# Patient Record
Sex: FEMALE | Race: WHITE | NOT HISPANIC OR LATINO | Employment: OTHER | URBAN - METROPOLITAN AREA
[De-identification: names, ages, dates, MRNs, and addresses within clinical notes are randomized per-mention and may not be internally consistent; named-entity substitution may affect disease eponyms.]

---

## 2022-01-01 ENCOUNTER — OFFICE VISIT (OUTPATIENT)
Dept: FAMILY MEDICINE | Facility: CLINIC | Age: 77
End: 2022-01-01
Payer: MEDICARE

## 2022-01-01 ENCOUNTER — LAB (OUTPATIENT)
Dept: INFUSION THERAPY | Facility: HOSPITAL | Age: 77
End: 2022-01-01
Attending: INTERNAL MEDICINE
Payer: MEDICARE

## 2022-01-01 ENCOUNTER — APPOINTMENT (OUTPATIENT)
Dept: RADIOLOGY | Facility: HOSPITAL | Age: 77
End: 2022-01-01
Attending: INTERNAL MEDICINE

## 2022-01-01 ENCOUNTER — HOSPITAL ENCOUNTER (OUTPATIENT)
Dept: INTERVENTIONAL RADIOLOGY/VASCULAR | Facility: HOSPITAL | Age: 77
Discharge: HOME | End: 2022-11-11
Attending: INTERNAL MEDICINE
Payer: MEDICARE

## 2022-01-01 ENCOUNTER — APPOINTMENT (OUTPATIENT)
Dept: RADIOLOGY | Facility: HOSPITAL | Age: 77
DRG: 177 | End: 2022-01-01
Attending: INTERNAL MEDICINE
Payer: MEDICARE

## 2022-01-01 ENCOUNTER — APPOINTMENT (OUTPATIENT)
Dept: RADIOLOGY | Facility: HOSPITAL | Age: 77
DRG: 391 | End: 2022-01-01
Attending: INTERNAL MEDICINE
Payer: MEDICARE

## 2022-01-01 ENCOUNTER — APPOINTMENT (OUTPATIENT)
Dept: RADIOLOGY | Facility: HOSPITAL | Age: 77
DRG: 177 | End: 2022-01-01
Attending: EMERGENCY MEDICINE
Payer: MEDICARE

## 2022-01-01 ENCOUNTER — TELEPHONE (OUTPATIENT)
Dept: PULMONOLOGY | Facility: OTHER | Age: 77
End: 2022-01-01

## 2022-01-01 ENCOUNTER — ANESTHESIA (OUTPATIENT)
Dept: SURGERY | Facility: HOSPITAL | Age: 77
DRG: 177 | End: 2022-01-01
Payer: MEDICARE

## 2022-01-01 ENCOUNTER — DOCUMENTATION ONLY (OUTPATIENT)
Dept: PULMONOLOGY | Facility: OTHER | Age: 77
End: 2022-01-01

## 2022-01-01 ENCOUNTER — ANCILLARY PROCEDURE (OUTPATIENT)
Dept: GENERAL RADIOLOGY | Facility: CLINIC | Age: 77
End: 2022-01-01
Attending: PHYSICIAN ASSISTANT
Payer: MEDICARE

## 2022-01-01 ENCOUNTER — HOSPITAL ENCOUNTER (OUTPATIENT)
Dept: MRI IMAGING | Facility: HOSPITAL | Age: 77
Discharge: HOME | End: 2022-11-11
Attending: INTERNAL MEDICINE
Payer: MEDICARE

## 2022-01-01 ENCOUNTER — MYC MEDICAL ADVICE (OUTPATIENT)
Dept: INTERVENTIONAL RADIOLOGY/VASCULAR | Facility: CLINIC | Age: 77
End: 2022-01-01

## 2022-01-01 ENCOUNTER — ONCOLOGY VISIT (OUTPATIENT)
Dept: ONCOLOGY | Facility: HOSPITAL | Age: 77
End: 2022-01-01
Attending: INTERNAL MEDICINE
Payer: MEDICARE

## 2022-01-01 ENCOUNTER — HOSPITAL ENCOUNTER (INPATIENT)
Facility: HOSPITAL | Age: 77
LOS: 5 days | Discharge: HOME | DRG: 177 | End: 2022-10-04
Attending: EMERGENCY MEDICINE | Admitting: INTERNAL MEDICINE
Payer: MEDICARE

## 2022-01-01 ENCOUNTER — TELEPHONE (OUTPATIENT)
Dept: PULMONOLOGY | Facility: OTHER | Age: 77
End: 2022-01-01
Payer: MEDICARE

## 2022-01-01 ENCOUNTER — MEDICAL CORRESPONDENCE (OUTPATIENT)
Dept: HEALTH INFORMATION MANAGEMENT | Facility: CLINIC | Age: 77
End: 2022-01-01

## 2022-01-01 ENCOUNTER — PATIENT OUTREACH (OUTPATIENT)
Dept: CARE COORDINATION | Facility: CLINIC | Age: 77
End: 2022-01-01

## 2022-01-01 ENCOUNTER — APPOINTMENT (OUTPATIENT)
Dept: CT IMAGING | Facility: HOSPITAL | Age: 77
DRG: 391 | End: 2022-01-01
Attending: EMERGENCY MEDICINE
Payer: MEDICARE

## 2022-01-01 ENCOUNTER — APPOINTMENT (OUTPATIENT)
Dept: OCCUPATIONAL THERAPY | Facility: HOSPITAL | Age: 77
DRG: 177 | End: 2022-01-01
Attending: INTERNAL MEDICINE
Payer: MEDICARE

## 2022-01-01 ENCOUNTER — APPOINTMENT (OUTPATIENT)
Dept: CT IMAGING | Facility: HOSPITAL | Age: 77
DRG: 177 | End: 2022-01-01
Attending: EMERGENCY MEDICINE
Payer: MEDICARE

## 2022-01-01 ENCOUNTER — HOSPITAL ENCOUNTER (OUTPATIENT)
Dept: ULTRASOUND IMAGING | Facility: HOSPITAL | Age: 77
Discharge: HOME | End: 2022-10-28
Attending: INTERNAL MEDICINE | Admitting: INTERNAL MEDICINE
Payer: MEDICARE

## 2022-01-01 ENCOUNTER — PATIENT OUTREACH (OUTPATIENT)
Dept: ONCOLOGY | Facility: HOSPITAL | Age: 77
End: 2022-01-01

## 2022-01-01 ENCOUNTER — HOSPITAL ENCOUNTER (INPATIENT)
Facility: HOSPITAL | Age: 77
LOS: 6 days | Discharge: HOSPICE | DRG: 391 | End: 2022-11-26
Attending: EMERGENCY MEDICINE | Admitting: STUDENT IN AN ORGANIZED HEALTH CARE EDUCATION/TRAINING PROGRAM
Payer: MEDICARE

## 2022-01-01 ENCOUNTER — APPOINTMENT (OUTPATIENT)
Dept: PHYSICAL THERAPY | Facility: HOSPITAL | Age: 77
DRG: 177 | End: 2022-01-01
Attending: INTERNAL MEDICINE
Payer: MEDICARE

## 2022-01-01 ENCOUNTER — HOSPITAL ENCOUNTER (INPATIENT)
Facility: HOSPITAL | Age: 77
LOS: 4 days | End: 2022-11-30
Attending: INTERNAL MEDICINE | Admitting: INTERNAL MEDICINE
Payer: MEDICARE

## 2022-01-01 ENCOUNTER — DOCUMENTATION ONLY (OUTPATIENT)
Dept: OTHER | Facility: CLINIC | Age: 77
End: 2022-01-01

## 2022-01-01 ENCOUNTER — APPOINTMENT (OUTPATIENT)
Dept: PHYSICAL THERAPY | Facility: HOSPITAL | Age: 77
DRG: 177 | End: 2022-01-01
Payer: MEDICARE

## 2022-01-01 ENCOUNTER — HOSPITAL ENCOUNTER (OUTPATIENT)
Dept: PET IMAGING | Facility: HOSPITAL | Age: 77
Discharge: HOME | End: 2022-10-13
Attending: INTERNAL MEDICINE | Admitting: INTERNAL MEDICINE
Payer: MEDICARE

## 2022-01-01 ENCOUNTER — ANESTHESIA EVENT (OUTPATIENT)
Dept: SURGERY | Facility: HOSPITAL | Age: 77
DRG: 177 | End: 2022-01-01
Payer: MEDICARE

## 2022-01-01 VITALS
HEIGHT: 63 IN | BODY MASS INDEX: 24.1 KG/M2 | DIASTOLIC BLOOD PRESSURE: 54 MMHG | WEIGHT: 136 LBS | RESPIRATION RATE: 22 BRPM | SYSTOLIC BLOOD PRESSURE: 108 MMHG | OXYGEN SATURATION: 92 % | HEART RATE: 98 BPM | TEMPERATURE: 99.2 F

## 2022-01-01 VITALS
OXYGEN SATURATION: 94 % | BODY MASS INDEX: 26.34 KG/M2 | HEART RATE: 102 BPM | TEMPERATURE: 99.8 F | DIASTOLIC BLOOD PRESSURE: 60 MMHG | WEIGHT: 144 LBS | SYSTOLIC BLOOD PRESSURE: 104 MMHG

## 2022-01-01 VITALS
SYSTOLIC BLOOD PRESSURE: 122 MMHG | RESPIRATION RATE: 20 BRPM | DIASTOLIC BLOOD PRESSURE: 58 MMHG | HEIGHT: 63 IN | BODY MASS INDEX: 24.26 KG/M2 | WEIGHT: 136.9 LBS | TEMPERATURE: 98.4 F | OXYGEN SATURATION: 93 % | HEART RATE: 90 BPM

## 2022-01-01 VITALS
TEMPERATURE: 98.1 F | OXYGEN SATURATION: 96 % | DIASTOLIC BLOOD PRESSURE: 61 MMHG | RESPIRATION RATE: 18 BRPM | HEART RATE: 108 BPM | SYSTOLIC BLOOD PRESSURE: 111 MMHG

## 2022-01-01 VITALS
DIASTOLIC BLOOD PRESSURE: 80 MMHG | HEART RATE: 78 BPM | OXYGEN SATURATION: 94 % | TEMPERATURE: 97.8 F | RESPIRATION RATE: 18 BRPM | SYSTOLIC BLOOD PRESSURE: 124 MMHG

## 2022-01-01 VITALS
HEIGHT: 62 IN | SYSTOLIC BLOOD PRESSURE: 98 MMHG | TEMPERATURE: 98.5 F | DIASTOLIC BLOOD PRESSURE: 52 MMHG | BODY MASS INDEX: 26.48 KG/M2 | HEART RATE: 100 BPM | RESPIRATION RATE: 16 BRPM | OXYGEN SATURATION: 91 % | WEIGHT: 143.9 LBS

## 2022-01-01 VITALS
HEART RATE: 70 BPM | RESPIRATION RATE: 18 BRPM | SYSTOLIC BLOOD PRESSURE: 112 MMHG | OXYGEN SATURATION: 96 % | TEMPERATURE: 98.4 F | DIASTOLIC BLOOD PRESSURE: 64 MMHG

## 2022-01-01 DIAGNOSIS — R13.12 OROPHARYNGEAL DYSPHAGIA: ICD-10-CM

## 2022-01-01 DIAGNOSIS — C34.91 NON-SMALL CELL CANCER OF RIGHT LUNG (H): ICD-10-CM

## 2022-01-01 DIAGNOSIS — R91.8 LUNG MASS: ICD-10-CM

## 2022-01-01 DIAGNOSIS — D70.1 CHEMOTHERAPY-INDUCED NEUTROPENIA (H): ICD-10-CM

## 2022-01-01 DIAGNOSIS — T17.908D ASPIRATION OF FOREIGN BODY, SUBSEQUENT ENCOUNTER: ICD-10-CM

## 2022-01-01 DIAGNOSIS — C34.31 MALIGNANT NEOPLASM OF LOWER LOBE OF RIGHT LUNG (H): Primary | ICD-10-CM

## 2022-01-01 DIAGNOSIS — R09.A2 GLOBUS SENSATION: ICD-10-CM

## 2022-01-01 DIAGNOSIS — D70.1 CHEMOTHERAPY-INDUCED NEUTROPENIA (H): Primary | ICD-10-CM

## 2022-01-01 DIAGNOSIS — J96.01 ACUTE RESPIRATORY FAILURE WITH HYPOXIA (H): Primary | ICD-10-CM

## 2022-01-01 DIAGNOSIS — J96.01 ACUTE RESPIRATORY FAILURE WITH HYPOXIA (H): ICD-10-CM

## 2022-01-01 DIAGNOSIS — J18.9 PNEUMONIA OF RIGHT LOWER LOBE DUE TO INFECTIOUS ORGANISM: ICD-10-CM

## 2022-01-01 DIAGNOSIS — J44.1 COPD EXACERBATION (H): ICD-10-CM

## 2022-01-01 DIAGNOSIS — Z99.81 ON HOME OXYGEN THERAPY: ICD-10-CM

## 2022-01-01 DIAGNOSIS — D69.6 THROMBOCYTOPENIA (H): ICD-10-CM

## 2022-01-01 DIAGNOSIS — T45.1X5A CHEMOTHERAPY-INDUCED NEUTROPENIA (H): Primary | ICD-10-CM

## 2022-01-01 DIAGNOSIS — R05.1 ACUTE COUGH: Primary | ICD-10-CM

## 2022-01-01 DIAGNOSIS — T45.1X5A CHEMOTHERAPY-INDUCED NEUTROPENIA (H): ICD-10-CM

## 2022-01-01 DIAGNOSIS — C34.31 MALIGNANT NEOPLASM OF LOWER LOBE OF RIGHT LUNG (H): ICD-10-CM

## 2022-01-01 DIAGNOSIS — I82.C11 THROMBOSIS OF RIGHT INTERNAL JUGULAR VEIN (H): ICD-10-CM

## 2022-01-01 DIAGNOSIS — Z85.118 HISTORY OF LUNG CANCER: ICD-10-CM

## 2022-01-01 DIAGNOSIS — J44.9 COPD, GROUP D, BY GOLD 2017 CLASSIFICATION (H): Primary | ICD-10-CM

## 2022-01-01 DIAGNOSIS — C34.91 NON-SMALL CELL CANCER OF RIGHT LUNG (H): Primary | ICD-10-CM

## 2022-01-01 DIAGNOSIS — R91.8 LUNG MASS: Primary | ICD-10-CM

## 2022-01-01 DIAGNOSIS — R63.8 UNABLE TO EAT: ICD-10-CM

## 2022-01-01 LAB
ABO/RH(D): NORMAL
ALBUMIN SERPL BCG-MCNC: 3 G/DL (ref 3.5–5.2)
ALBUMIN SERPL BCG-MCNC: 3.3 G/DL (ref 3.5–5.2)
ALBUMIN SERPL BCG-MCNC: 3.6 G/DL (ref 3.5–5.2)
ALP SERPL-CCNC: 57 U/L (ref 35–104)
ALP SERPL-CCNC: 63 U/L (ref 35–104)
ALP SERPL-CCNC: 71 U/L (ref 35–104)
ALT SERPL W P-5'-P-CCNC: 17 U/L (ref 10–35)
ALT SERPL W P-5'-P-CCNC: 29 U/L (ref 10–35)
ALT SERPL W P-5'-P-CCNC: 36 U/L (ref 10–35)
ANION GAP SERPL CALCULATED.3IONS-SCNC: 11 MMOL/L (ref 7–15)
ANION GAP SERPL CALCULATED.3IONS-SCNC: 13 MMOL/L (ref 7–15)
ANION GAP SERPL CALCULATED.3IONS-SCNC: 13 MMOL/L (ref 7–15)
ANION GAP SERPL CALCULATED.3IONS-SCNC: 15 MMOL/L (ref 7–15)
ANION GAP SERPL CALCULATED.3IONS-SCNC: 7 MMOL/L (ref 7–15)
ANION GAP SERPL CALCULATED.3IONS-SCNC: 7 MMOL/L (ref 7–15)
ANION GAP SERPL CALCULATED.3IONS-SCNC: 9 MMOL/L (ref 7–15)
ANION GAP SERPL CALCULATED.3IONS-SCNC: 9 MMOL/L (ref 7–15)
ANTIBODY SCREEN: NEGATIVE
APPEARANCE FLD: ABNORMAL
APTT PPP: 24 SECONDS (ref 22–38)
AST SERPL W P-5'-P-CCNC: 14 U/L (ref 10–35)
AST SERPL W P-5'-P-CCNC: 17 U/L (ref 10–35)
AST SERPL W P-5'-P-CCNC: 50 U/L (ref 10–35)
ATRIAL RATE - MUSE: 87 BPM
BACTERIA BRONCH: NO GROWTH
BACTERIA SPT CULT: ABNORMAL
BACTERIA SPT CULT: NORMAL
BASE EXCESS BLDV CALC-SCNC: 0.8 MMOL/L
BASOPHILS # BLD AUTO: 0 10E3/UL (ref 0–0.2)
BASOPHILS # BLD AUTO: 0 10E3/UL (ref 0–0.2)
BASOPHILS # BLD AUTO: 0.1 10E3/UL (ref 0–0.2)
BASOPHILS # BLD MANUAL: 0 10E3/UL (ref 0–0.2)
BASOPHILS NFR BLD AUTO: 0 %
BASOPHILS NFR BLD AUTO: 1 %
BASOPHILS NFR BLD MANUAL: 0 %
BILIRUB DIRECT SERPL-MCNC: 0.34 MG/DL (ref 0–0.3)
BILIRUB SERPL-MCNC: 0.4 MG/DL
BILIRUB SERPL-MCNC: 0.8 MG/DL
BILIRUB SERPL-MCNC: 1.4 MG/DL
BLD PROD TYP BPU: NORMAL
BLOOD COMPONENT TYPE: NORMAL
BUN SERPL-MCNC: 10 MG/DL (ref 8–23)
BUN SERPL-MCNC: 10.4 MG/DL (ref 8–23)
BUN SERPL-MCNC: 12.3 MG/DL (ref 8–23)
BUN SERPL-MCNC: 13.2 MG/DL (ref 8–23)
BUN SERPL-MCNC: 13.7 MG/DL (ref 8–23)
BUN SERPL-MCNC: 14.1 MG/DL (ref 8–23)
BUN SERPL-MCNC: 6.4 MG/DL (ref 8–23)
BUN SERPL-MCNC: 7.9 MG/DL (ref 8–23)
CALCIUM SERPL-MCNC: 7.5 MG/DL (ref 8.8–10.2)
CALCIUM SERPL-MCNC: 7.6 MG/DL (ref 8.8–10.2)
CALCIUM SERPL-MCNC: 8 MG/DL (ref 8.8–10.2)
CALCIUM SERPL-MCNC: 8.3 MG/DL (ref 8.8–10.2)
CALCIUM SERPL-MCNC: 8.5 MG/DL (ref 8.8–10.2)
CALCIUM SERPL-MCNC: 8.5 MG/DL (ref 8.8–10.2)
CALCIUM SERPL-MCNC: 8.7 MG/DL (ref 8.8–10.2)
CALCIUM SERPL-MCNC: 9.3 MG/DL (ref 8.8–10.2)
CELL COUNT BODY FLUID SOURCE: ABNORMAL
CHLORIDE SERPL-SCNC: 101 MMOL/L (ref 98–107)
CHLORIDE SERPL-SCNC: 105 MMOL/L (ref 98–107)
CHLORIDE SERPL-SCNC: 106 MMOL/L (ref 98–107)
CHLORIDE SERPL-SCNC: 94 MMOL/L (ref 98–107)
CHLORIDE SERPL-SCNC: 95 MMOL/L (ref 98–107)
CHLORIDE SERPL-SCNC: 95 MMOL/L (ref 98–107)
CHLORIDE SERPL-SCNC: 96 MMOL/L (ref 98–107)
CHLORIDE SERPL-SCNC: 99 MMOL/L (ref 98–107)
CODING SYSTEM: NORMAL
COLOR FLD: ABNORMAL
CREAT SERPL-MCNC: 0.43 MG/DL (ref 0.51–0.95)
CREAT SERPL-MCNC: 0.48 MG/DL (ref 0.51–0.95)
CREAT SERPL-MCNC: 0.55 MG/DL (ref 0.51–0.95)
CREAT SERPL-MCNC: 0.55 MG/DL (ref 0.51–0.95)
CREAT SERPL-MCNC: 0.59 MG/DL (ref 0.51–0.95)
CREAT SERPL-MCNC: 0.62 MG/DL (ref 0.51–0.95)
CREAT SERPL-MCNC: 0.65 MG/DL (ref 0.51–0.95)
CREAT SERPL-MCNC: 0.65 MG/DL (ref 0.51–0.95)
CREAT SERPL-MCNC: 0.69 MG/DL (ref 0.51–0.95)
DEPRECATED HCO3 PLAS-SCNC: 21 MMOL/L (ref 22–29)
DEPRECATED HCO3 PLAS-SCNC: 24 MMOL/L (ref 22–29)
DEPRECATED HCO3 PLAS-SCNC: 25 MMOL/L (ref 22–29)
DEPRECATED HCO3 PLAS-SCNC: 25 MMOL/L (ref 22–29)
DEPRECATED HCO3 PLAS-SCNC: 26 MMOL/L (ref 22–29)
DEPRECATED HCO3 PLAS-SCNC: 28 MMOL/L (ref 22–29)
DEPRECATED HCO3 PLAS-SCNC: 28 MMOL/L (ref 22–29)
DEPRECATED HCO3 PLAS-SCNC: 30 MMOL/L (ref 22–29)
DIASTOLIC BLOOD PRESSURE - MUSE: NORMAL MMHG
EOSINOPHIL # BLD AUTO: 0 10E3/UL (ref 0–0.7)
EOSINOPHIL # BLD AUTO: 0.1 10E3/UL (ref 0–0.7)
EOSINOPHIL # BLD AUTO: 0.1 10E3/UL (ref 0–0.7)
EOSINOPHIL # BLD AUTO: 0.2 10E3/UL (ref 0–0.7)
EOSINOPHIL # BLD AUTO: 0.7 10E3/UL (ref 0–0.7)
EOSINOPHIL # BLD MANUAL: 0 10E3/UL (ref 0–0.7)
EOSINOPHIL NFR BLD AUTO: 0 %
EOSINOPHIL NFR BLD AUTO: 0 %
EOSINOPHIL NFR BLD AUTO: 2 %
EOSINOPHIL NFR BLD AUTO: 2 %
EOSINOPHIL NFR BLD AUTO: 7 %
EOSINOPHIL NFR BLD MANUAL: 0 %
EOSINOPHIL NFR FLD MANUAL: 1 %
ERYTHROCYTE [DISTWIDTH] IN BLOOD BY AUTOMATED COUNT: 13.9 % (ref 10–15)
ERYTHROCYTE [DISTWIDTH] IN BLOOD BY AUTOMATED COUNT: 14 % (ref 10–15)
ERYTHROCYTE [DISTWIDTH] IN BLOOD BY AUTOMATED COUNT: 14.1 % (ref 10–15)
ERYTHROCYTE [DISTWIDTH] IN BLOOD BY AUTOMATED COUNT: 14.2 % (ref 10–15)
ERYTHROCYTE [DISTWIDTH] IN BLOOD BY AUTOMATED COUNT: 14.2 % (ref 10–15)
ERYTHROCYTE [DISTWIDTH] IN BLOOD BY AUTOMATED COUNT: 14.3 % (ref 10–15)
ERYTHROCYTE [DISTWIDTH] IN BLOOD BY AUTOMATED COUNT: 14.4 % (ref 10–15)
ERYTHROCYTE [DISTWIDTH] IN BLOOD BY AUTOMATED COUNT: 14.5 % (ref 10–15)
ERYTHROCYTE [DISTWIDTH] IN BLOOD BY AUTOMATED COUNT: 14.6 % (ref 10–15)
FLUAV RNA SPEC QL NAA+PROBE: NEGATIVE
FLUBV RNA RESP QL NAA+PROBE: NEGATIVE
GFR SERPL CREATININE-BSD FRML MDRD: 90 ML/MIN/1.73M2
GFR SERPL CREATININE-BSD FRML MDRD: >90 ML/MIN/1.73M2
GLUCOSE BLDC GLUCOMTR-MCNC: 147 MG/DL (ref 70–99)
GLUCOSE BLDC GLUCOMTR-MCNC: 195 MG/DL (ref 70–99)
GLUCOSE BLDC GLUCOMTR-MCNC: 61 MG/DL (ref 70–99)
GLUCOSE BLDC GLUCOMTR-MCNC: 85 MG/DL (ref 70–99)
GLUCOSE BLDC GLUCOMTR-MCNC: 87 MG/DL (ref 70–99)
GLUCOSE BLDC GLUCOMTR-MCNC: 94 MG/DL (ref 70–99)
GLUCOSE SERPL-MCNC: 111 MG/DL (ref 70–99)
GLUCOSE SERPL-MCNC: 113 MG/DL (ref 70–99)
GLUCOSE SERPL-MCNC: 132 MG/DL (ref 70–99)
GLUCOSE SERPL-MCNC: 134 MG/DL (ref 70–99)
GLUCOSE SERPL-MCNC: 171 MG/DL (ref 70–99)
GLUCOSE SERPL-MCNC: 56 MG/DL (ref 70–99)
GLUCOSE SERPL-MCNC: 70 MG/DL (ref 70–99)
GLUCOSE SERPL-MCNC: 95 MG/DL (ref 70–99)
GRAM STAIN RESULT: ABNORMAL
GRAM STAIN RESULT: NORMAL
HCO3 BLDV-SCNC: 24 MMOL/L (ref 24–30)
HCT VFR BLD AUTO: 32.5 % (ref 35–47)
HCT VFR BLD AUTO: 32.9 % (ref 35–47)
HCT VFR BLD AUTO: 34.2 % (ref 35–47)
HCT VFR BLD AUTO: 34.9 % (ref 35–47)
HCT VFR BLD AUTO: 36 % (ref 35–47)
HCT VFR BLD AUTO: 39.6 % (ref 35–47)
HCT VFR BLD AUTO: 40.5 % (ref 35–47)
HCT VFR BLD AUTO: 41.9 % (ref 35–47)
HCT VFR BLD AUTO: 42.1 % (ref 35–47)
HCT VFR BLD AUTO: 42.7 % (ref 35–47)
HCT VFR BLD AUTO: 43.9 % (ref 35–47)
HCT VFR BLD AUTO: 44.3 % (ref 35–47)
HCT VFR BLD AUTO: 46.1 % (ref 35–47)
HGB BLD-MCNC: 10.2 G/DL (ref 11.7–15.7)
HGB BLD-MCNC: 10.3 G/DL (ref 11.7–15.7)
HGB BLD-MCNC: 11.1 G/DL (ref 11.7–15.7)
HGB BLD-MCNC: 11.3 G/DL (ref 11.7–15.7)
HGB BLD-MCNC: 11.6 G/DL (ref 11.7–15.7)
HGB BLD-MCNC: 12.3 G/DL (ref 11.7–15.7)
HGB BLD-MCNC: 13 G/DL (ref 11.7–15.7)
HGB BLD-MCNC: 13.1 G/DL (ref 11.7–15.7)
HGB BLD-MCNC: 13.4 G/DL (ref 11.7–15.7)
HGB BLD-MCNC: 13.6 G/DL (ref 11.7–15.7)
HGB BLD-MCNC: 14 G/DL (ref 11.7–15.7)
HGB BLD-MCNC: 14.4 G/DL (ref 11.7–15.7)
HGB BLD-MCNC: 14.6 G/DL (ref 11.7–15.7)
HOLD SPECIMEN: NORMAL
HOLD SPECIMEN: NORMAL
IMM GRANULOCYTES # BLD: 0.1 10E3/UL
IMM GRANULOCYTES # BLD: 0.1 10E3/UL
IMM GRANULOCYTES # BLD: 0.2 10E3/UL
IMM GRANULOCYTES # BLD: 0.3 10E3/UL
IMM GRANULOCYTES # BLD: 0.6 10E3/UL
IMM GRANULOCYTES NFR BLD: 1 %
IMM GRANULOCYTES NFR BLD: 2 %
IMM GRANULOCYTES NFR BLD: 3 %
INR PPP: 1.05 (ref 0.85–1.15)
INTERPRETATION ECG - MUSE: NORMAL
INTERPRETATION: NORMAL
ISSUE DATE AND TIME: NORMAL
LAB DIRECTOR COMMENTS: NORMAL
LAB DIRECTOR DISCLAIMER: NORMAL
LAB DIRECTOR INTERPRETATION: NORMAL
LAB DIRECTOR METHODOLOGY: NORMAL
LAB DIRECTOR RESULTS: NORMAL
LACTATE SERPL-SCNC: 1.1 MMOL/L (ref 0.7–2)
LYMPHOCYTES # BLD AUTO: 0.5 10E3/UL (ref 0.8–5.3)
LYMPHOCYTES # BLD AUTO: 0.6 10E3/UL (ref 0.8–5.3)
LYMPHOCYTES # BLD AUTO: 0.9 10E3/UL (ref 0.8–5.3)
LYMPHOCYTES # BLD AUTO: 1.6 10E3/UL (ref 0.8–5.3)
LYMPHOCYTES # BLD AUTO: 2 10E3/UL (ref 0.8–5.3)
LYMPHOCYTES # BLD MANUAL: 0.2 10E3/UL (ref 0.8–5.3)
LYMPHOCYTES # BLD MANUAL: 0.4 10E3/UL (ref 0.8–5.3)
LYMPHOCYTES # BLD MANUAL: 0.4 10E3/UL (ref 0.8–5.3)
LYMPHOCYTES # BLD MANUAL: 0.5 10E3/UL (ref 0.8–5.3)
LYMPHOCYTES NFR BLD AUTO: 12 %
LYMPHOCYTES NFR BLD AUTO: 18 %
LYMPHOCYTES NFR BLD AUTO: 3 %
LYMPHOCYTES NFR BLD AUTO: 7 %
LYMPHOCYTES NFR BLD AUTO: 9 %
LYMPHOCYTES NFR BLD MANUAL: 22 %
LYMPHOCYTES NFR BLD MANUAL: 27 %
LYMPHOCYTES NFR BLD MANUAL: 64 %
LYMPHOCYTES NFR BLD MANUAL: 88 %
LYMPHOCYTES NFR FLD MANUAL: 9 %
MCH RBC QN AUTO: 27.3 PG (ref 26.5–33)
MCH RBC QN AUTO: 27.5 PG (ref 26.5–33)
MCH RBC QN AUTO: 27.7 PG (ref 26.5–33)
MCH RBC QN AUTO: 27.9 PG (ref 26.5–33)
MCH RBC QN AUTO: 28 PG (ref 26.5–33)
MCH RBC QN AUTO: 28.1 PG (ref 26.5–33)
MCH RBC QN AUTO: 28.2 PG (ref 26.5–33)
MCH RBC QN AUTO: 28.6 PG (ref 26.5–33)
MCH RBC QN AUTO: 28.7 PG (ref 26.5–33)
MCHC RBC AUTO-ENTMCNC: 31 G/DL (ref 31.5–36.5)
MCHC RBC AUTO-ENTMCNC: 31 G/DL (ref 31.5–36.5)
MCHC RBC AUTO-ENTMCNC: 31.1 G/DL (ref 31.5–36.5)
MCHC RBC AUTO-ENTMCNC: 31.1 G/DL (ref 31.5–36.5)
MCHC RBC AUTO-ENTMCNC: 31.2 G/DL (ref 31.5–36.5)
MCHC RBC AUTO-ENTMCNC: 31.6 G/DL (ref 31.5–36.5)
MCHC RBC AUTO-ENTMCNC: 31.7 G/DL (ref 31.5–36.5)
MCHC RBC AUTO-ENTMCNC: 31.9 G/DL (ref 31.5–36.5)
MCHC RBC AUTO-ENTMCNC: 32.2 G/DL (ref 31.5–36.5)
MCHC RBC AUTO-ENTMCNC: 32.4 G/DL (ref 31.5–36.5)
MCHC RBC AUTO-ENTMCNC: 32.5 G/DL (ref 31.5–36.5)
MCHC RBC AUTO-ENTMCNC: 33.1 G/DL (ref 31.5–36.5)
MCHC RBC AUTO-ENTMCNC: 33.3 G/DL (ref 31.5–36.5)
MCV RBC AUTO: 85 FL (ref 78–100)
MCV RBC AUTO: 86 FL (ref 78–100)
MCV RBC AUTO: 86 FL (ref 78–100)
MCV RBC AUTO: 87 FL (ref 78–100)
MCV RBC AUTO: 88 FL (ref 78–100)
MCV RBC AUTO: 89 FL (ref 78–100)
MCV RBC AUTO: 90 FL (ref 78–100)
MCV RBC AUTO: 90 FL (ref 78–100)
MCV RBC AUTO: 91 FL (ref 78–100)
MONOCYTES # BLD AUTO: 0 10E3/UL (ref 0–1.3)
MONOCYTES # BLD AUTO: 0 10E3/UL (ref 0–1.3)
MONOCYTES # BLD AUTO: 1 10E3/UL (ref 0–1.3)
MONOCYTES # BLD AUTO: 1.4 10E3/UL (ref 0–1.3)
MONOCYTES # BLD AUTO: 1.9 10E3/UL (ref 0–1.3)
MONOCYTES # BLD MANUAL: 0 10E3/UL (ref 0–1.3)
MONOCYTES NFR BLD AUTO: 0 %
MONOCYTES NFR BLD AUTO: 1 %
MONOCYTES NFR BLD AUTO: 7 %
MONOCYTES NFR BLD AUTO: 8 %
MONOCYTES NFR BLD AUTO: 9 %
MONOCYTES NFR BLD MANUAL: 0 %
MONOCYTES NFR BLD MANUAL: 1 %
MONOCYTES NFR BLD MANUAL: 2 %
MONOCYTES NFR BLD MANUAL: 2 %
MONOS+MACROS NFR FLD MANUAL: 14 %
NEUTROPHILS # BLD AUTO: 17.7 10E3/UL (ref 1.6–8.3)
NEUTROPHILS # BLD AUTO: 18.8 10E3/UL (ref 1.6–8.3)
NEUTROPHILS # BLD AUTO: 3.8 10E3/UL (ref 1.6–8.3)
NEUTROPHILS # BLD AUTO: 7.4 10E3/UL (ref 1.6–8.3)
NEUTROPHILS # BLD AUTO: 8.2 10E3/UL (ref 1.6–8.3)
NEUTROPHILS # BLD MANUAL: 0.1 10E3/UL (ref 1.6–8.3)
NEUTROPHILS # BLD MANUAL: 0.2 10E3/UL (ref 1.6–8.3)
NEUTROPHILS # BLD MANUAL: 0.5 10E3/UL (ref 1.6–8.3)
NEUTROPHILS # BLD MANUAL: 1.2 10E3/UL (ref 1.6–8.3)
NEUTROPHILS NFR BLD AUTO: 64 %
NEUTROPHILS NFR BLD AUTO: 82 %
NEUTROPHILS NFR BLD AUTO: 83 %
NEUTROPHILS NFR BLD AUTO: 87 %
NEUTROPHILS NFR BLD AUTO: 88 %
NEUTROPHILS NFR BLD MANUAL: 12 %
NEUTROPHILS NFR BLD MANUAL: 35 %
NEUTROPHILS NFR BLD MANUAL: 71 %
NEUTROPHILS NFR BLD MANUAL: 76 %
NEUTS BAND NFR FLD MANUAL: 76 %
NRBC # BLD AUTO: 0 10E3/UL
NRBC BLD AUTO-RTO: 0 /100
NT-PROBNP SERPL-MCNC: 440 PG/ML (ref 0–900)
OXYHGB MFR BLDV: 56.5 % (ref 70–75)
P AXIS - MUSE: 61 DEGREES
PATH REPORT.COMMENTS IMP SPEC: ABNORMAL
PATH REPORT.COMMENTS IMP SPEC: NORMAL
PATH REPORT.COMMENTS IMP SPEC: YES
PATH REPORT.FINAL DX SPEC: ABNORMAL
PATH REPORT.FINAL DX SPEC: NORMAL
PATH REPORT.GROSS SPEC: ABNORMAL
PATH REPORT.GROSS SPEC: NORMAL
PATH REPORT.MICROSCOPIC SPEC OTHER STN: ABNORMAL
PATH REPORT.MICROSCOPIC SPEC OTHER STN: NORMAL
PATH REPORT.RELEVANT HX SPEC: ABNORMAL
PATH REPORT.RELEVANT HX SPEC: NORMAL
PATH REV: ABNORMAL
PCO2 BLDV: 46 MM HG (ref 35–50)
PF4 HEPARIN CMPLX AB SER QL: NEGATIVE
PH BLDV: 7.36 [PH] (ref 7.35–7.45)
PLAT MORPH BLD: ABNORMAL
PLAT MORPH BLD: NORMAL
PLATELET # BLD AUTO: 13 10E3/UL (ref 150–450)
PLATELET # BLD AUTO: 139 10E3/UL (ref 150–450)
PLATELET # BLD AUTO: 14 10E3/UL (ref 150–450)
PLATELET # BLD AUTO: 199 10E3/UL (ref 150–450)
PLATELET # BLD AUTO: 212 10E3/UL (ref 150–450)
PLATELET # BLD AUTO: 233 10E3/UL (ref 150–450)
PLATELET # BLD AUTO: 24 10E3/UL (ref 150–450)
PLATELET # BLD AUTO: 24 10E3/UL (ref 150–450)
PLATELET # BLD AUTO: 246 10E3/UL (ref 150–450)
PLATELET # BLD AUTO: 289 10E3/UL (ref 150–450)
PLATELET # BLD AUTO: 37 10E3/UL (ref 150–450)
PLATELET # BLD AUTO: 40 10E3/UL (ref 150–450)
PLATELET # BLD AUTO: 49 10E3/UL (ref 150–450)
PLATELET # BLD AUTO: 9 10E3/UL (ref 150–450)
PO2 BLDV: 31 MM HG (ref 25–47)
POTASSIUM SERPL-SCNC: 3.3 MMOL/L (ref 3.4–5.3)
POTASSIUM SERPL-SCNC: 3.8 MMOL/L (ref 3.4–5.3)
POTASSIUM SERPL-SCNC: 3.8 MMOL/L (ref 3.4–5.3)
POTASSIUM SERPL-SCNC: 3.9 MMOL/L (ref 3.4–5.3)
POTASSIUM SERPL-SCNC: 4 MMOL/L (ref 3.4–5.3)
POTASSIUM SERPL-SCNC: 4.1 MMOL/L (ref 3.4–5.3)
POTASSIUM SERPL-SCNC: 4.2 MMOL/L (ref 3.4–5.3)
POTASSIUM SERPL-SCNC: 4.3 MMOL/L (ref 3.4–5.3)
PR INTERVAL - MUSE: 154 MS
PROCALCITONIN SERPL IA-MCNC: 0.11 NG/ML
PROT SERPL-MCNC: 5.1 G/DL (ref 6.4–8.3)
PROT SERPL-MCNC: 5.8 G/DL (ref 6.4–8.3)
PROT SERPL-MCNC: 6.4 G/DL (ref 6.4–8.3)
QRS DURATION - MUSE: 68 MS
QT - MUSE: 358 MS
QTC - MUSE: 430 MS
R AXIS - MUSE: -53 DEGREES
RBC # BLD AUTO: 3.69 10E6/UL (ref 3.8–5.2)
RBC # BLD AUTO: 3.73 10E6/UL (ref 3.8–5.2)
RBC # BLD AUTO: 4.01 10E6/UL (ref 3.8–5.2)
RBC # BLD AUTO: 4.02 10E6/UL (ref 3.8–5.2)
RBC # BLD AUTO: 4.12 10E6/UL (ref 3.8–5.2)
RBC # BLD AUTO: 4.39 10E6/UL (ref 3.8–5.2)
RBC # BLD AUTO: 4.63 10E6/UL (ref 3.8–5.2)
RBC # BLD AUTO: 4.69 10E6/UL (ref 3.8–5.2)
RBC # BLD AUTO: 4.69 10E6/UL (ref 3.8–5.2)
RBC # BLD AUTO: 4.84 10E6/UL (ref 3.8–5.2)
RBC # BLD AUTO: 5.09 10E6/UL (ref 3.8–5.2)
RBC # BLD AUTO: 5.09 10E6/UL (ref 3.8–5.2)
RBC # BLD AUTO: 5.16 10E6/UL (ref 3.8–5.2)
RBC # FLD: 9010 /UL
RBC MORPH BLD: ABNORMAL
RBC MORPH BLD: NORMAL
RSV RNA SPEC NAA+PROBE: NEGATIVE
SAO2 % BLDV: 57.3 % (ref 70–75)
SARS-COV-2 RNA RESP QL NAA+PROBE: NEGATIVE
SARS-COV-2 RNA RESP QL NAA+PROBE: NEGATIVE
SIGNIFICANT RESULTS: NORMAL
SODIUM SERPL-SCNC: 131 MMOL/L (ref 136–145)
SODIUM SERPL-SCNC: 132 MMOL/L (ref 136–145)
SODIUM SERPL-SCNC: 134 MMOL/L (ref 136–145)
SODIUM SERPL-SCNC: 134 MMOL/L (ref 136–145)
SODIUM SERPL-SCNC: 137 MMOL/L (ref 136–145)
SODIUM SERPL-SCNC: 137 MMOL/L (ref 136–145)
SODIUM SERPL-SCNC: 138 MMOL/L (ref 136–145)
SODIUM SERPL-SCNC: 139 MMOL/L (ref 136–145)
SPECIMEN DESCRIPTION: NORMAL
SPECIMEN DESCRIPTION: NORMAL
SPECIMEN EXPIRATION DATE: NORMAL
SYSTOLIC BLOOD PRESSURE - MUSE: NORMAL MMHG
T AXIS - MUSE: 57 DEGREES
TEST DETAILS, MDL: NORMAL
TROPONIN T SERPL HS-MCNC: 13 NG/L
TSH SERPL DL<=0.005 MIU/L-ACNC: 1.03 UIU/ML (ref 0.3–4.2)
UNIT ABO/RH: NORMAL
UNIT NUMBER: NORMAL
UNIT STATUS: NORMAL
UNIT TYPE ISBT: 6200
VENTRICULAR RATE- MUSE: 87 BPM
WBC # BLD AUTO: 0.4 10E3/UL (ref 4–11)
WBC # BLD AUTO: 0.5 10E3/UL (ref 4–11)
WBC # BLD AUTO: 0.6 10E3/UL (ref 4–11)
WBC # BLD AUTO: 0.7 10E3/UL (ref 4–11)
WBC # BLD AUTO: 1.7 10E3/UL (ref 4–11)
WBC # BLD AUTO: 11.2 10E3/UL (ref 4–11)
WBC # BLD AUTO: 12.6 10E3/UL (ref 4–11)
WBC # BLD AUTO: 19.8 10E3/UL (ref 4–11)
WBC # BLD AUTO: 2.6 10E3/UL (ref 4–11)
WBC # BLD AUTO: 22.5 10E3/UL (ref 4–11)
WBC # BLD AUTO: 4.5 10E3/UL (ref 4–11)
WBC # BLD AUTO: 9.3 10E3/UL (ref 4–11)
WBC # BLD AUTO: 9.5 10E3/UL (ref 4–11)
WBC # FLD AUTO: 30 /UL

## 2022-01-01 PROCEDURE — 250N000013 HC RX MED GY IP 250 OP 250 PS 637: Performed by: INTERNAL MEDICINE

## 2022-01-01 PROCEDURE — 97116 GAIT TRAINING THERAPY: CPT | Mod: GP

## 2022-01-01 PROCEDURE — 258N000003 HC RX IP 258 OP 636: Performed by: NURSE PRACTITIONER

## 2022-01-01 PROCEDURE — 99232 SBSQ HOSP IP/OBS MODERATE 35: CPT | Mod: GV | Performed by: INTERNAL MEDICINE

## 2022-01-01 PROCEDURE — 71045 X-RAY EXAM CHEST 1 VIEW: CPT

## 2022-01-01 PROCEDURE — 96366 THER/PROPH/DIAG IV INF ADDON: CPT

## 2022-01-01 PROCEDURE — 99152 MOD SED SAME PHYS/QHP 5/>YRS: CPT

## 2022-01-01 PROCEDURE — 85027 COMPLETE CBC AUTOMATED: CPT | Performed by: STUDENT IN AN ORGANIZED HEALTH CARE EDUCATION/TRAINING PROGRAM

## 2022-01-01 PROCEDURE — 250N000011 HC RX IP 250 OP 636: Performed by: PHYSICIAN ASSISTANT

## 2022-01-01 PROCEDURE — 85027 COMPLETE CBC AUTOMATED: CPT | Performed by: NURSE PRACTITIONER

## 2022-01-01 PROCEDURE — 250N000009 HC RX 250: Performed by: HOSPITALIST

## 2022-01-01 PROCEDURE — 250N000011 HC RX IP 250 OP 636: Performed by: INTERNAL MEDICINE

## 2022-01-01 PROCEDURE — 250N000009 HC RX 250: Performed by: INTERNAL MEDICINE

## 2022-01-01 PROCEDURE — 36561 INSERT TUNNELED CV CATH: CPT

## 2022-01-01 PROCEDURE — 96413 CHEMO IV INFUSION 1 HR: CPT

## 2022-01-01 PROCEDURE — 99232 SBSQ HOSP IP/OBS MODERATE 35: CPT | Performed by: STUDENT IN AN ORGANIZED HEALTH CARE EDUCATION/TRAINING PROGRAM

## 2022-01-01 PROCEDURE — G1010 CDSM STANSON: HCPCS

## 2022-01-01 PROCEDURE — 87205 SMEAR GRAM STAIN: CPT | Performed by: INTERNAL MEDICINE

## 2022-01-01 PROCEDURE — 250N000011 HC RX IP 250 OP 636: Performed by: NURSE PRACTITIONER

## 2022-01-01 PROCEDURE — C1769 GUIDE WIRE: HCPCS

## 2022-01-01 PROCEDURE — 80048 BASIC METABOLIC PNL TOTAL CA: CPT | Performed by: EMERGENCY MEDICINE

## 2022-01-01 PROCEDURE — 99207 PR NO BILLABLE SERVICE THIS VISIT: CPT | Performed by: INTERNAL MEDICINE

## 2022-01-01 PROCEDURE — 36415 COLL VENOUS BLD VENIPUNCTURE: CPT

## 2022-01-01 PROCEDURE — 250N000009 HC RX 250: Performed by: NURSE PRACTITIONER

## 2022-01-01 PROCEDURE — 85018 HEMOGLOBIN: CPT | Performed by: INTERNAL MEDICINE

## 2022-01-01 PROCEDURE — 96376 TX/PRO/DX INJ SAME DRUG ADON: CPT

## 2022-01-01 PROCEDURE — 99233 SBSQ HOSP IP/OBS HIGH 50: CPT | Performed by: PHYSICIAN ASSISTANT

## 2022-01-01 PROCEDURE — 31623 DX BRONCHOSCOPE/BRUSH: CPT | Performed by: INTERNAL MEDICINE

## 2022-01-01 PROCEDURE — 0BDC8ZX EXTRACTION OF RIGHT UPPER LUNG LOBE, VIA NATURAL OR ARTIFICIAL OPENING ENDOSCOPIC, DIAGNOSTIC: ICD-10-PCS | Performed by: INTERNAL MEDICINE

## 2022-01-01 PROCEDURE — 94640 AIRWAY INHALATION TREATMENT: CPT

## 2022-01-01 PROCEDURE — 87077 CULTURE AEROBIC IDENTIFY: CPT | Performed by: HOSPITALIST

## 2022-01-01 PROCEDURE — 88342 IMHCHEM/IMCYTCHM 1ST ANTB: CPT | Mod: 26 | Performed by: PATHOLOGY

## 2022-01-01 PROCEDURE — 88333 PATH CONSLTJ SURG CYTO XM 1: CPT | Mod: 26 | Performed by: PATHOLOGY

## 2022-01-01 PROCEDURE — 99233 SBSQ HOSP IP/OBS HIGH 50: CPT | Performed by: INTERNAL MEDICINE

## 2022-01-01 PROCEDURE — 86901 BLOOD TYPING SEROLOGIC RH(D): CPT | Performed by: INTERNAL MEDICINE

## 2022-01-01 PROCEDURE — 38505 NEEDLE BIOPSY LYMPH NODES: CPT

## 2022-01-01 PROCEDURE — 110N000005 HC R&B HOSPICE, ACCENT

## 2022-01-01 PROCEDURE — 94640 AIRWAY INHALATION TREATMENT: CPT | Mod: 76

## 2022-01-01 PROCEDURE — 88341 IMHCHEM/IMCYTCHM EA ADD ANTB: CPT | Mod: 26 | Performed by: PATHOLOGY

## 2022-01-01 PROCEDURE — G0378 HOSPITAL OBSERVATION PER HR: HCPCS

## 2022-01-01 PROCEDURE — 272N000500 HC NEEDLE CR2

## 2022-01-01 PROCEDURE — 99233 SBSQ HOSP IP/OBS HIGH 50: CPT | Performed by: NURSE PRACTITIONER

## 2022-01-01 PROCEDURE — 99233 SBSQ HOSP IP/OBS HIGH 50: CPT | Mod: 25 | Performed by: INTERNAL MEDICINE

## 2022-01-01 PROCEDURE — 96375 TX/PRO/DX INJ NEW DRUG ADDON: CPT

## 2022-01-01 PROCEDURE — 99232 SBSQ HOSP IP/OBS MODERATE 35: CPT | Performed by: INTERNAL MEDICINE

## 2022-01-01 PROCEDURE — 85730 THROMBOPLASTIN TIME PARTIAL: CPT | Performed by: EMERGENCY MEDICINE

## 2022-01-01 PROCEDURE — 87205 SMEAR GRAM STAIN: CPT | Performed by: HOSPITALIST

## 2022-01-01 PROCEDURE — 272N000602 HC WOUND GLUE CR1

## 2022-01-01 PROCEDURE — 999N000157 HC STATISTIC RCP TIME EA 10 MIN

## 2022-01-01 PROCEDURE — 120N000001 HC R&B MED SURG/OB

## 2022-01-01 PROCEDURE — 80053 COMPREHEN METABOLIC PANEL: CPT

## 2022-01-01 PROCEDURE — 94799 UNLISTED PULMONARY SVC/PX: CPT

## 2022-01-01 PROCEDURE — 258N000003 HC RX IP 258 OP 636: Performed by: NURSE ANESTHETIST, CERTIFIED REGISTERED

## 2022-01-01 PROCEDURE — 97535 SELF CARE MNGMENT TRAINING: CPT | Mod: GO

## 2022-01-01 PROCEDURE — 93010 ELECTROCARDIOGRAM REPORT: CPT | Mod: HIP | Performed by: INTERNAL MEDICINE

## 2022-01-01 PROCEDURE — 97162 PT EVAL MOD COMPLEX 30 MIN: CPT | Mod: GP

## 2022-01-01 PROCEDURE — 258N000001 HC RX 258: Performed by: STUDENT IN AN ORGANIZED HEALTH CARE EDUCATION/TRAINING PROGRAM

## 2022-01-01 PROCEDURE — 250N000011 HC RX IP 250 OP 636: Performed by: EMERGENCY MEDICINE

## 2022-01-01 PROCEDURE — 86850 RBC ANTIBODY SCREEN: CPT | Performed by: INTERNAL MEDICINE

## 2022-01-01 PROCEDURE — 99223 1ST HOSP IP/OBS HIGH 75: CPT | Performed by: PHYSICIAN ASSISTANT

## 2022-01-01 PROCEDURE — 36415 COLL VENOUS BLD VENIPUNCTURE: CPT | Performed by: INTERNAL MEDICINE

## 2022-01-01 PROCEDURE — 250N000011 HC RX IP 250 OP 636

## 2022-01-01 PROCEDURE — 96361 HYDRATE IV INFUSION ADD-ON: CPT

## 2022-01-01 PROCEDURE — 250N000009 HC RX 250: Performed by: NURSE ANESTHETIST, CERTIFIED REGISTERED

## 2022-01-01 PROCEDURE — 250N000013 HC RX MED GY IP 250 OP 250 PS 637: Performed by: PHYSICIAN ASSISTANT

## 2022-01-01 PROCEDURE — 80048 BASIC METABOLIC PNL TOTAL CA: CPT | Performed by: INTERNAL MEDICINE

## 2022-01-01 PROCEDURE — 96372 THER/PROPH/DIAG INJ SC/IM: CPT | Performed by: EMERGENCY MEDICINE

## 2022-01-01 PROCEDURE — 710N000009 HC RECOVERY PHASE 1, LEVEL 1, PER MIN: Performed by: INTERNAL MEDICINE

## 2022-01-01 PROCEDURE — 84145 PROCALCITONIN (PCT): CPT | Performed by: EMERGENCY MEDICINE

## 2022-01-01 PROCEDURE — 80048 BASIC METABOLIC PNL TOTAL CA: CPT | Performed by: STUDENT IN AN ORGANIZED HEALTH CARE EDUCATION/TRAINING PROGRAM

## 2022-01-01 PROCEDURE — 87637 SARSCOV2&INF A&B&RSV AMP PRB: CPT | Performed by: EMERGENCY MEDICINE

## 2022-01-01 PROCEDURE — G0452 MOLECULAR PATHOLOGY INTERPR: HCPCS | Mod: 26 | Performed by: STUDENT IN AN ORGANIZED HEALTH CARE EDUCATION/TRAINING PROGRAM

## 2022-01-01 PROCEDURE — C9803 HOPD COVID-19 SPEC COLLECT: HCPCS

## 2022-01-01 PROCEDURE — 82310 ASSAY OF CALCIUM: CPT | Performed by: STUDENT IN AN ORGANIZED HEALTH CARE EDUCATION/TRAINING PROGRAM

## 2022-01-01 PROCEDURE — 250N000011 HC RX IP 250 OP 636: Performed by: RADIOLOGY

## 2022-01-01 PROCEDURE — 258N000003 HC RX IP 258 OP 636: Performed by: STUDENT IN AN ORGANIZED HEALTH CARE EDUCATION/TRAINING PROGRAM

## 2022-01-01 PROCEDURE — 0BDF8ZX EXTRACTION OF RIGHT LOWER LUNG LOBE, VIA NATURAL OR ARTIFICIAL OPENING ENDOSCOPIC, DIAGNOSTIC: ICD-10-PCS | Performed by: INTERNAL MEDICINE

## 2022-01-01 PROCEDURE — 85610 PROTHROMBIN TIME: CPT | Performed by: EMERGENCY MEDICINE

## 2022-01-01 PROCEDURE — 250N000011 HC RX IP 250 OP 636: Performed by: STUDENT IN AN ORGANIZED HEALTH CARE EDUCATION/TRAINING PROGRAM

## 2022-01-01 PROCEDURE — A9585 GADOBUTROL INJECTION: HCPCS | Performed by: INTERNAL MEDICINE

## 2022-01-01 PROCEDURE — 88112 CYTOPATH CELL ENHANCE TECH: CPT | Mod: 26 | Performed by: PATHOLOGY

## 2022-01-01 PROCEDURE — 88305 TISSUE EXAM BY PATHOLOGIST: CPT | Mod: TC | Performed by: INTERNAL MEDICINE

## 2022-01-01 PROCEDURE — 99223 1ST HOSP IP/OBS HIGH 75: CPT | Mod: AI | Performed by: INTERNAL MEDICINE

## 2022-01-01 PROCEDURE — 87070 CULTURE OTHR SPECIMN AEROBIC: CPT | Performed by: INTERNAL MEDICINE

## 2022-01-01 PROCEDURE — 81455 SO/HL 51/>GSAP DNA/DNA&RNA: CPT | Performed by: INTERNAL MEDICINE

## 2022-01-01 PROCEDURE — 272N000221 US BIOPSY FINE NEEDLE ASPIRATION LYMPH NODE

## 2022-01-01 PROCEDURE — 99239 HOSP IP/OBS DSCHRG MGMT >30: CPT | Performed by: INTERNAL MEDICINE

## 2022-01-01 PROCEDURE — 93005 ELECTROCARDIOGRAM TRACING: CPT

## 2022-01-01 PROCEDURE — 81445 SO NEO GSAP 5-50DNA/DNA&RNA: CPT | Performed by: INTERNAL MEDICINE

## 2022-01-01 PROCEDURE — 85027 COMPLETE CBC AUTOMATED: CPT | Performed by: INTERNAL MEDICINE

## 2022-01-01 PROCEDURE — 94660 CPAP INITIATION&MGMT: CPT

## 2022-01-01 PROCEDURE — 272N000710 US BIOPSY FINE NEEDLE ASPIRATION LYMPH NODE

## 2022-01-01 PROCEDURE — 250N000013 HC RX MED GY IP 250 OP 250 PS 637: Performed by: EMERGENCY MEDICINE

## 2022-01-01 PROCEDURE — 258N000003 HC RX IP 258 OP 636: Performed by: EMERGENCY MEDICINE

## 2022-01-01 PROCEDURE — 36415 COLL VENOUS BLD VENIPUNCTURE: CPT | Performed by: NURSE PRACTITIONER

## 2022-01-01 PROCEDURE — 99232 SBSQ HOSP IP/OBS MODERATE 35: CPT | Performed by: HOSPITALIST

## 2022-01-01 PROCEDURE — 84443 ASSAY THYROID STIM HORMONE: CPT | Performed by: INTERNAL MEDICINE

## 2022-01-01 PROCEDURE — 255N000002 HC RX 255 OP 636: Performed by: INTERNAL MEDICINE

## 2022-01-01 PROCEDURE — A9552 F18 FDG: HCPCS | Performed by: INTERNAL MEDICINE

## 2022-01-01 PROCEDURE — 85025 COMPLETE CBC W/AUTO DIFF WBC: CPT

## 2022-01-01 PROCEDURE — 0B9F8ZX DRAINAGE OF RIGHT LOWER LUNG LOBE, VIA NATURAL OR ARTIFICIAL OPENING ENDOSCOPIC, DIAGNOSTIC: ICD-10-PCS | Performed by: INTERNAL MEDICINE

## 2022-01-01 PROCEDURE — 36415 COLL VENOUS BLD VENIPUNCTURE: CPT | Performed by: EMERGENCY MEDICINE

## 2022-01-01 PROCEDURE — 88333 PATH CONSLTJ SURG CYTO XM 1: CPT | Mod: TC | Performed by: INTERNAL MEDICINE

## 2022-01-01 PROCEDURE — 88108 CYTOPATH CONCENTRATE TECH: CPT | Mod: 26 | Performed by: PATHOLOGY

## 2022-01-01 PROCEDURE — 71046 X-RAY EXAM CHEST 2 VIEWS: CPT | Mod: TC | Performed by: RADIOLOGY

## 2022-01-01 PROCEDURE — 97165 OT EVAL LOW COMPLEX 30 MIN: CPT | Mod: GO

## 2022-01-01 PROCEDURE — 97530 THERAPEUTIC ACTIVITIES: CPT | Mod: GP

## 2022-01-01 PROCEDURE — 250N000013 HC RX MED GY IP 250 OP 250 PS 637: Performed by: HOSPITALIST

## 2022-01-01 PROCEDURE — 85007 BL SMEAR W/DIFF WBC COUNT: CPT | Performed by: NURSE PRACTITIONER

## 2022-01-01 PROCEDURE — 99232 SBSQ HOSP IP/OBS MODERATE 35: CPT | Performed by: PHYSICIAN ASSISTANT

## 2022-01-01 PROCEDURE — 88305 TISSUE EXAM BY PATHOLOGIST: CPT | Mod: 26 | Performed by: PATHOLOGY

## 2022-01-01 PROCEDURE — 360N000076 HC SURGERY LEVEL 3, PER MIN: Performed by: INTERNAL MEDICINE

## 2022-01-01 PROCEDURE — 36415 COLL VENOUS BLD VENIPUNCTURE: CPT | Performed by: STUDENT IN AN ORGANIZED HEALTH CARE EDUCATION/TRAINING PROGRAM

## 2022-01-01 PROCEDURE — 96365 THER/PROPH/DIAG IV INF INIT: CPT | Mod: 59

## 2022-01-01 PROCEDURE — 82962 GLUCOSE BLOOD TEST: CPT

## 2022-01-01 PROCEDURE — 99496 TRANSJ CARE MGMT HIGH F2F 7D: CPT | Performed by: PHYSICIAN ASSISTANT

## 2022-01-01 PROCEDURE — 85049 AUTOMATED PLATELET COUNT: CPT | Performed by: INTERNAL MEDICINE

## 2022-01-01 PROCEDURE — 85014 HEMATOCRIT: CPT | Performed by: EMERGENCY MEDICINE

## 2022-01-01 PROCEDURE — 96367 TX/PROPH/DG ADDL SEQ IV INF: CPT

## 2022-01-01 PROCEDURE — 82805 BLOOD GASES W/O2 SATURATION: CPT | Performed by: EMERGENCY MEDICINE

## 2022-01-01 PROCEDURE — 83605 ASSAY OF LACTIC ACID: CPT | Performed by: EMERGENCY MEDICINE

## 2022-01-01 PROCEDURE — 258N000003 HC RX IP 258 OP 636: Performed by: INTERNAL MEDICINE

## 2022-01-01 PROCEDURE — 82248 BILIRUBIN DIRECT: CPT | Performed by: EMERGENCY MEDICINE

## 2022-01-01 PROCEDURE — 99220 PR INITIAL OBSERVATION CARE,LEVEL III: CPT | Performed by: INTERNAL MEDICINE

## 2022-01-01 PROCEDURE — 85025 COMPLETE CBC W/AUTO DIFF WBC: CPT | Performed by: INTERNAL MEDICINE

## 2022-01-01 PROCEDURE — C1788 PORT, INDWELLING, IMP: HCPCS

## 2022-01-01 PROCEDURE — 89051 BODY FLUID CELL COUNT: CPT | Performed by: INTERNAL MEDICINE

## 2022-01-01 PROCEDURE — 86022 PLATELET ANTIBODIES: CPT | Performed by: INTERNAL MEDICINE

## 2022-01-01 PROCEDURE — G1010 CDSM STANSON: HCPCS | Mod: PI

## 2022-01-01 PROCEDURE — 88360 TUMOR IMMUNOHISTOCHEM/MANUAL: CPT | Mod: 26 | Performed by: PATHOLOGY

## 2022-01-01 PROCEDURE — 99285 EMERGENCY DEPT VISIT HI MDM: CPT | Mod: 25

## 2022-01-01 PROCEDURE — 96374 THER/PROPH/DIAG INJ IV PUSH: CPT | Mod: 59

## 2022-01-01 PROCEDURE — U0005 INFEC AGEN DETEC AMPLI PROBE: HCPCS | Performed by: EMERGENCY MEDICINE

## 2022-01-01 PROCEDURE — 85025 COMPLETE CBC W/AUTO DIFF WBC: CPT | Performed by: EMERGENCY MEDICINE

## 2022-01-01 PROCEDURE — 250N000012 HC RX MED GY IP 250 OP 636 PS 637: Performed by: INTERNAL MEDICINE

## 2022-01-01 PROCEDURE — 93005 ELECTROCARDIOGRAM TRACING: CPT | Performed by: EMERGENCY MEDICINE

## 2022-01-01 PROCEDURE — 82565 ASSAY OF CREATININE: CPT | Performed by: INTERNAL MEDICINE

## 2022-01-01 PROCEDURE — G0463 HOSPITAL OUTPT CLINIC VISIT: HCPCS

## 2022-01-01 PROCEDURE — 96372 THER/PROPH/DIAG INJ SC/IM: CPT | Performed by: STUDENT IN AN ORGANIZED HEALTH CARE EDUCATION/TRAINING PROGRAM

## 2022-01-01 PROCEDURE — 250N000011 HC RX IP 250 OP 636: Performed by: NURSE ANESTHETIST, CERTIFIED REGISTERED

## 2022-01-01 PROCEDURE — 83880 ASSAY OF NATRIURETIC PEPTIDE: CPT | Performed by: EMERGENCY MEDICINE

## 2022-01-01 PROCEDURE — P9035 PLATELET PHERES LEUKOREDUCED: HCPCS | Performed by: INTERNAL MEDICINE

## 2022-01-01 PROCEDURE — 99223 1ST HOSP IP/OBS HIGH 75: CPT | Performed by: INTERNAL MEDICINE

## 2022-01-01 PROCEDURE — 84484 ASSAY OF TROPONIN QUANT: CPT | Performed by: EMERGENCY MEDICINE

## 2022-01-01 PROCEDURE — 80053 COMPREHEN METABOLIC PANEL: CPT | Performed by: INTERNAL MEDICINE

## 2022-01-01 PROCEDURE — 82310 ASSAY OF CALCIUM: CPT | Performed by: EMERGENCY MEDICINE

## 2022-01-01 PROCEDURE — 96372 THER/PROPH/DIAG INJ SC/IM: CPT | Performed by: INTERNAL MEDICINE

## 2022-01-01 PROCEDURE — 999N000141 HC STATISTIC PRE-PROCEDURE NURSING ASSESSMENT: Performed by: INTERNAL MEDICINE

## 2022-01-01 PROCEDURE — 70360 X-RAY EXAM OF NECK: CPT

## 2022-01-01 PROCEDURE — 31624 DX BRONCHOSCOPE/LAVAGE: CPT | Mod: 59 | Performed by: INTERNAL MEDICINE

## 2022-01-01 PROCEDURE — 370N000017 HC ANESTHESIA TECHNICAL FEE, PER MIN: Performed by: INTERNAL MEDICINE

## 2022-01-01 PROCEDURE — 70553 MRI BRAIN STEM W/O & W/DYE: CPT | Mod: XE,MG

## 2022-01-01 PROCEDURE — 99205 OFFICE O/P NEW HI 60 MIN: CPT | Performed by: INTERNAL MEDICINE

## 2022-01-01 PROCEDURE — 96411 CHEMO IV PUSH ADDL DRUG: CPT

## 2022-01-01 PROCEDURE — 96417 CHEMO IV INFUS EACH ADDL SEQ: CPT

## 2022-01-01 PROCEDURE — 343N000001 HC RX 343: Performed by: INTERNAL MEDICINE

## 2022-01-01 PROCEDURE — 99233 SBSQ HOSP IP/OBS HIGH 50: CPT | Performed by: HOSPITALIST

## 2022-01-01 RX ORDER — HEPARIN SODIUM,PORCINE 10 UNIT/ML
5-10 VIAL (ML) INTRAVENOUS EVERY 24 HOURS
Status: DISCONTINUED | OUTPATIENT
Start: 2022-01-01 | End: 2022-01-01

## 2022-01-01 RX ORDER — MORPHINE SULFATE 2 MG/ML
2 INJECTION, SOLUTION INTRAMUSCULAR; INTRAVENOUS ONCE
Status: COMPLETED | OUTPATIENT
Start: 2022-01-01 | End: 2022-01-01

## 2022-01-01 RX ORDER — PALONOSETRON 0.05 MG/ML
0.25 INJECTION, SOLUTION INTRAVENOUS ONCE
Status: COMPLETED | OUTPATIENT
Start: 2022-01-01 | End: 2022-01-01

## 2022-01-01 RX ORDER — ACETAMINOPHEN 325 MG/1
650 TABLET ORAL EVERY 6 HOURS PRN
Status: DISCONTINUED | OUTPATIENT
Start: 2022-01-01 | End: 2022-01-01 | Stop reason: HOSPADM

## 2022-01-01 RX ORDER — FENTANYL CITRATE 50 UG/ML
INJECTION, SOLUTION INTRAMUSCULAR; INTRAVENOUS PRN
Status: DISCONTINUED | OUTPATIENT
Start: 2022-01-01 | End: 2022-01-01

## 2022-01-01 RX ORDER — NALOXONE HYDROCHLORIDE 0.4 MG/ML
0.2 INJECTION, SOLUTION INTRAMUSCULAR; INTRAVENOUS; SUBCUTANEOUS
Status: DISCONTINUED | OUTPATIENT
Start: 2022-01-01 | End: 2022-01-01 | Stop reason: HOSPADM

## 2022-01-01 RX ORDER — IPRATROPIUM BROMIDE AND ALBUTEROL SULFATE 2.5; .5 MG/3ML; MG/3ML
3 SOLUTION RESPIRATORY (INHALATION) EVERY 6 HOURS PRN
Status: DISCONTINUED | OUTPATIENT
Start: 2022-01-01 | End: 2022-01-01 | Stop reason: HOSPADM

## 2022-01-01 RX ORDER — NALOXONE HYDROCHLORIDE 0.4 MG/ML
0.4 INJECTION, SOLUTION INTRAMUSCULAR; INTRAVENOUS; SUBCUTANEOUS
Status: DISCONTINUED | OUTPATIENT
Start: 2022-01-01 | End: 2022-01-01

## 2022-01-01 RX ORDER — IOPAMIDOL 755 MG/ML
100 INJECTION, SOLUTION INTRAVASCULAR ONCE
Status: COMPLETED | OUTPATIENT
Start: 2022-01-01 | End: 2022-01-01

## 2022-01-01 RX ORDER — MORPHINE SULFATE 10 MG/5ML
5 SOLUTION ORAL
Status: DISCONTINUED | OUTPATIENT
Start: 2022-01-01 | End: 2022-01-01 | Stop reason: ALTCHOICE

## 2022-01-01 RX ORDER — DEXAMETHASONE SODIUM PHOSPHATE 10 MG/ML
4 INJECTION, SOLUTION INTRAMUSCULAR; INTRAVENOUS EVERY 24 HOURS
Status: COMPLETED | OUTPATIENT
Start: 2022-01-01 | End: 2022-01-01

## 2022-01-01 RX ORDER — CARBOXYMETHYLCELLULOSE SODIUM 5 MG/ML
1-2 SOLUTION/ DROPS OPHTHALMIC
Status: CANCELLED | OUTPATIENT
Start: 2022-01-01

## 2022-01-01 RX ORDER — ONDANSETRON 4 MG/1
4 TABLET, ORALLY DISINTEGRATING ORAL EVERY 30 MIN PRN
Status: DISCONTINUED | OUTPATIENT
Start: 2022-01-01 | End: 2022-01-01 | Stop reason: HOSPADM

## 2022-01-01 RX ORDER — NICOTINE POLACRILEX 4 MG
15-30 LOZENGE BUCCAL
Status: DISCONTINUED | OUTPATIENT
Start: 2022-01-01 | End: 2022-01-01 | Stop reason: HOSPADM

## 2022-01-01 RX ORDER — DIPHENHYDRAMINE HYDROCHLORIDE AND LIDOCAINE HYDROCHLORIDE AND ALUMINUM HYDROXIDE AND MAGNESIUM HYDRO
10 KIT ONCE
Status: COMPLETED | OUTPATIENT
Start: 2022-01-01 | End: 2022-01-01

## 2022-01-01 RX ORDER — HYDROMORPHONE HCL IN WATER/PF 6 MG/30 ML
.2-.4 PATIENT CONTROLLED ANALGESIA SYRINGE INTRAVENOUS
Status: DISCONTINUED | OUTPATIENT
Start: 2022-01-01 | End: 2022-01-01

## 2022-01-01 RX ORDER — SODIUM CHLORIDE, SODIUM LACTATE, POTASSIUM CHLORIDE, CALCIUM CHLORIDE 600; 310; 30; 20 MG/100ML; MG/100ML; MG/100ML; MG/100ML
INJECTION, SOLUTION INTRAVENOUS CONTINUOUS PRN
Status: DISCONTINUED | OUTPATIENT
Start: 2022-01-01 | End: 2022-01-01

## 2022-01-01 RX ORDER — CEFAZOLIN SODIUM/WATER 2 G/20 ML
2 SYRINGE (ML) INTRAVENOUS
Status: COMPLETED | OUTPATIENT
Start: 2022-01-01 | End: 2022-01-01

## 2022-01-01 RX ORDER — DIPHENHYDRAMINE HYDROCHLORIDE AND LIDOCAINE HYDROCHLORIDE AND ALUMINUM HYDROXIDE AND MAGNESIUM HYDRO
10 KIT
Status: DISCONTINUED | OUTPATIENT
Start: 2022-01-01 | End: 2022-01-01

## 2022-01-01 RX ORDER — DEXAMETHASONE 4 MG/1
4 TABLET ORAL 2 TIMES DAILY WITH MEALS
Qty: 6 TABLET | Refills: 3 | Status: SHIPPED | OUTPATIENT
Start: 2022-01-01

## 2022-01-01 RX ORDER — METHYLPREDNISOLONE SODIUM SUCCINATE 125 MG/2ML
125 INJECTION, POWDER, LYOPHILIZED, FOR SOLUTION INTRAMUSCULAR; INTRAVENOUS
Status: CANCELLED
Start: 2022-01-01

## 2022-01-01 RX ORDER — HEPARIN SODIUM,PORCINE 10 UNIT/ML
5 VIAL (ML) INTRAVENOUS
Status: CANCELLED | OUTPATIENT
Start: 2022-01-01

## 2022-01-01 RX ORDER — METHYLPREDNISOLONE 4 MG/1
4 TABLET ORAL
Status: DISCONTINUED | OUTPATIENT
Start: 2022-01-01 | End: 2022-01-01

## 2022-01-01 RX ORDER — ALBUTEROL SULFATE 90 UG/1
1-2 AEROSOL, METERED RESPIRATORY (INHALATION)
Status: CANCELLED
Start: 2022-01-01

## 2022-01-01 RX ORDER — IBUPROFEN 200 MG
200 TABLET ORAL EVERY 6 HOURS PRN
Status: ON HOLD | COMMUNITY
Start: 2022-01-01 | End: 2022-01-01

## 2022-01-01 RX ORDER — ONDANSETRON 2 MG/ML
4 INJECTION INTRAMUSCULAR; INTRAVENOUS EVERY 6 HOURS PRN
Status: DISCONTINUED | OUTPATIENT
Start: 2022-01-01 | End: 2022-01-01 | Stop reason: HOSPADM

## 2022-01-01 RX ORDER — BISACODYL 10 MG
10 SUPPOSITORY, RECTAL RECTAL
Status: CANCELLED | OUTPATIENT
Start: 2022-01-01

## 2022-01-01 RX ORDER — NALOXONE HYDROCHLORIDE 0.4 MG/ML
0.2 INJECTION, SOLUTION INTRAMUSCULAR; INTRAVENOUS; SUBCUTANEOUS
Status: CANCELLED | OUTPATIENT
Start: 2022-01-01

## 2022-01-01 RX ORDER — ACETAMINOPHEN 650 MG/1
650 SUPPOSITORY RECTAL EVERY 6 HOURS PRN
Status: DISCONTINUED | OUTPATIENT
Start: 2022-01-01 | End: 2022-01-01 | Stop reason: HOSPADM

## 2022-01-01 RX ORDER — ENOXAPARIN SODIUM 100 MG/ML
1 INJECTION SUBCUTANEOUS ONCE
Status: COMPLETED | OUTPATIENT
Start: 2022-01-01 | End: 2022-01-01

## 2022-01-01 RX ORDER — IODINE/SODIUM IODIDE 2 %
TINCTURE TOPICAL
Status: DISCONTINUED | OUTPATIENT
Start: 2022-01-01 | End: 2022-01-01 | Stop reason: HOSPADM

## 2022-01-01 RX ORDER — HEPARIN SODIUM (PORCINE) LOCK FLUSH IV SOLN 100 UNIT/ML 100 UNIT/ML
5 SOLUTION INTRAVENOUS
Status: DISCONTINUED | OUTPATIENT
Start: 2022-01-01 | End: 2022-01-01 | Stop reason: HOSPADM

## 2022-01-01 RX ORDER — DIPHENHYDRAMINE HYDROCHLORIDE, ZINC ACETATE 2; .1 G/100G; G/100G
CREAM TOPICAL EVERY 6 HOURS PRN
Status: DISCONTINUED | OUTPATIENT
Start: 2022-01-01 | End: 2022-01-01

## 2022-01-01 RX ORDER — LORAZEPAM 2 MG/ML
0.5 INJECTION INTRAMUSCULAR EVERY 6 HOURS
Status: DISCONTINUED | OUTPATIENT
Start: 2022-01-01 | End: 2022-01-01 | Stop reason: HOSPADM

## 2022-01-01 RX ORDER — BISACODYL 10 MG
10 SUPPOSITORY, RECTAL RECTAL DAILY PRN
Status: DISCONTINUED | OUTPATIENT
Start: 2022-01-01 | End: 2022-01-01 | Stop reason: HOSPADM

## 2022-01-01 RX ORDER — MORPHINE SULFATE 4 MG/ML
3 INJECTION, SOLUTION INTRAMUSCULAR; INTRAVENOUS EVERY 6 HOURS
Status: DISCONTINUED | OUTPATIENT
Start: 2022-01-01 | End: 2022-01-01 | Stop reason: HOSPADM

## 2022-01-01 RX ORDER — ENOXAPARIN SODIUM 100 MG/ML
60 INJECTION SUBCUTANEOUS ONCE
Status: COMPLETED | OUTPATIENT
Start: 2022-01-01 | End: 2022-01-01

## 2022-01-01 RX ORDER — DIAZEPAM 5 MG
5 TABLET ORAL EVERY 12 HOURS PRN
Qty: 4 TABLET | Refills: 0 | Status: SHIPPED | OUTPATIENT
Start: 2022-01-01 | End: 2022-01-01

## 2022-01-01 RX ORDER — SCOLOPAMINE TRANSDERMAL SYSTEM 1 MG/1
1 PATCH, EXTENDED RELEASE TRANSDERMAL
Status: DISCONTINUED | OUTPATIENT
Start: 2022-01-01 | End: 2022-01-01 | Stop reason: HOSPADM

## 2022-01-01 RX ORDER — DIPHENHYDRAMINE HYDROCHLORIDE, ZINC ACETATE 2; .1 G/100G; G/100G
CREAM TOPICAL EVERY 6 HOURS PRN
Status: DISCONTINUED | OUTPATIENT
Start: 2022-01-01 | End: 2022-01-01 | Stop reason: HOSPADM

## 2022-01-01 RX ORDER — DEXTROSE MONOHYDRATE 25 G/50ML
25-50 INJECTION, SOLUTION INTRAVENOUS
Status: CANCELLED | OUTPATIENT
Start: 2022-01-01

## 2022-01-01 RX ORDER — LIDOCAINE 40 MG/G
CREAM TOPICAL
Status: DISCONTINUED | OUTPATIENT
Start: 2022-01-01 | End: 2022-01-01 | Stop reason: HOSPADM

## 2022-01-01 RX ORDER — NALOXONE HYDROCHLORIDE 0.4 MG/ML
0.4 INJECTION, SOLUTION INTRAMUSCULAR; INTRAVENOUS; SUBCUTANEOUS
Status: CANCELLED | OUTPATIENT
Start: 2022-01-01

## 2022-01-01 RX ORDER — SODIUM CHLORIDE 9 MG/ML
INJECTION, SOLUTION INTRAVENOUS CONTINUOUS
Status: DISCONTINUED | OUTPATIENT
Start: 2022-01-01 | End: 2022-01-01 | Stop reason: HOSPADM

## 2022-01-01 RX ORDER — GUAIFENESIN 600 MG/1
600 TABLET, EXTENDED RELEASE ORAL 2 TIMES DAILY
Qty: 10 TABLET | Refills: 0 | Status: SHIPPED | OUTPATIENT
Start: 2022-01-01 | End: 2022-01-01

## 2022-01-01 RX ORDER — ONDANSETRON 8 MG/1
8 TABLET, FILM COATED ORAL EVERY 8 HOURS PRN
Qty: 30 TABLET | Refills: 2 | Status: CANCELLED | OUTPATIENT
Start: 2022-01-01

## 2022-01-01 RX ORDER — HEPARIN SODIUM (PORCINE) LOCK FLUSH IV SOLN 100 UNIT/ML 100 UNIT/ML
SOLUTION INTRAVENOUS
Status: COMPLETED
Start: 2022-01-01 | End: 2022-01-01

## 2022-01-01 RX ORDER — BUDESONIDE 0.5 MG/2ML
0.5 INHALANT ORAL 2 TIMES DAILY PRN
Status: DISCONTINUED | OUTPATIENT
Start: 2022-01-01 | End: 2022-01-01 | Stop reason: HOSPADM

## 2022-01-01 RX ORDER — DOXYCYCLINE 100 MG/1
100 CAPSULE ORAL 2 TIMES DAILY
Qty: 20 CAPSULE | Refills: 0 | Status: SHIPPED | OUTPATIENT
Start: 2022-01-01 | End: 2022-01-01

## 2022-01-01 RX ORDER — DEXAMETHASONE 4 MG/1
4 TABLET ORAL 2 TIMES DAILY WITH MEALS
Qty: 6 TABLET | Refills: 3 | Status: CANCELLED | OUTPATIENT
Start: 2022-01-01

## 2022-01-01 RX ORDER — DEXTROSE MONOHYDRATE, SODIUM CHLORIDE, AND POTASSIUM CHLORIDE 50; 1.49; 9 G/1000ML; G/1000ML; G/1000ML
INJECTION, SOLUTION INTRAVENOUS CONTINUOUS
Status: DISPENSED | OUTPATIENT
Start: 2022-01-01 | End: 2022-01-01

## 2022-01-01 RX ORDER — NALOXONE HYDROCHLORIDE 0.4 MG/ML
0.4 INJECTION, SOLUTION INTRAMUSCULAR; INTRAVENOUS; SUBCUTANEOUS
Status: DISCONTINUED | OUTPATIENT
Start: 2022-01-01 | End: 2022-01-01 | Stop reason: HOSPADM

## 2022-01-01 RX ORDER — DEXTROSE MONOHYDRATE 25 G/50ML
25-50 INJECTION, SOLUTION INTRAVENOUS
Status: DISCONTINUED | OUTPATIENT
Start: 2022-01-01 | End: 2022-01-01 | Stop reason: HOSPADM

## 2022-01-01 RX ORDER — ONDANSETRON 4 MG/1
4 TABLET, ORALLY DISINTEGRATING ORAL EVERY 6 HOURS PRN
Status: DISCONTINUED | OUTPATIENT
Start: 2022-01-01 | End: 2022-01-01 | Stop reason: HOSPADM

## 2022-01-01 RX ORDER — NALOXONE HYDROCHLORIDE 0.4 MG/ML
0.1 INJECTION, SOLUTION INTRAMUSCULAR; INTRAVENOUS; SUBCUTANEOUS
Status: DISCONTINUED | OUTPATIENT
Start: 2022-01-01 | End: 2022-01-01 | Stop reason: HOSPADM

## 2022-01-01 RX ORDER — HYDROMORPHONE HCL IN WATER/PF 6 MG/30 ML
0.2 PATIENT CONTROLLED ANALGESIA SYRINGE INTRAVENOUS
Status: DISCONTINUED | OUTPATIENT
Start: 2022-01-01 | End: 2022-01-01

## 2022-01-01 RX ORDER — PALONOSETRON 0.05 MG/ML
0.25 INJECTION, SOLUTION INTRAVENOUS ONCE
Status: CANCELLED
Start: 2022-01-01

## 2022-01-01 RX ORDER — ACETAMINOPHEN 650 MG/1
650 SUPPOSITORY RECTAL EVERY 6 HOURS PRN
Status: CANCELLED | OUTPATIENT
Start: 2022-01-01

## 2022-01-01 RX ORDER — METHYLPREDNISOLONE 4 MG/1
8 TABLET ORAL AT BEDTIME
Status: DISCONTINUED | OUTPATIENT
Start: 2022-01-01 | End: 2022-01-01

## 2022-01-01 RX ORDER — DEXAMETHASONE SODIUM PHOSPHATE 10 MG/ML
4 INJECTION, SOLUTION INTRAMUSCULAR; INTRAVENOUS 2 TIMES DAILY
Status: DISCONTINUED | OUTPATIENT
Start: 2022-01-01 | End: 2022-01-01

## 2022-01-01 RX ORDER — GABAPENTIN 250 MG/5ML
100 SOLUTION ORAL AT BEDTIME
Status: DISCONTINUED | OUTPATIENT
Start: 2022-01-01 | End: 2022-01-01

## 2022-01-01 RX ORDER — MORPHINE SULFATE 2 MG/ML
2 INJECTION, SOLUTION INTRAMUSCULAR; INTRAVENOUS EVERY 6 HOURS
Status: DISCONTINUED | OUTPATIENT
Start: 2022-01-01 | End: 2022-01-01

## 2022-01-01 RX ORDER — PROPOFOL 10 MG/ML
INJECTION, EMULSION INTRAVENOUS CONTINUOUS PRN
Status: DISCONTINUED | OUTPATIENT
Start: 2022-01-01 | End: 2022-01-01

## 2022-01-01 RX ORDER — ALBUTEROL SULFATE 90 UG/1
2 AEROSOL, METERED RESPIRATORY (INHALATION) EVERY 4 HOURS PRN
Status: DISCONTINUED | OUTPATIENT
Start: 2022-01-01 | End: 2022-01-01 | Stop reason: HOSPADM

## 2022-01-01 RX ORDER — PIPERACILLIN SODIUM, TAZOBACTAM SODIUM 3; .375 G/15ML; G/15ML
3.38 INJECTION, POWDER, LYOPHILIZED, FOR SOLUTION INTRAVENOUS EVERY 8 HOURS
Status: DISCONTINUED | OUTPATIENT
Start: 2022-01-01 | End: 2022-01-01

## 2022-01-01 RX ORDER — MEPERIDINE HYDROCHLORIDE 25 MG/ML
25 INJECTION INTRAMUSCULAR; INTRAVENOUS; SUBCUTANEOUS EVERY 30 MIN PRN
Status: CANCELLED | OUTPATIENT
Start: 2022-01-01

## 2022-01-01 RX ORDER — LIDOCAINE HYDROCHLORIDE 10 MG/ML
INJECTION, SOLUTION INFILTRATION; PERINEURAL PRN
Status: DISCONTINUED | OUTPATIENT
Start: 2022-01-01 | End: 2022-01-01

## 2022-01-01 RX ORDER — ALBUTEROL SULFATE 90 UG/1
2 AEROSOL, METERED RESPIRATORY (INHALATION) EVERY 4 HOURS PRN
Status: CANCELLED | OUTPATIENT
Start: 2022-01-01

## 2022-01-01 RX ORDER — BISACODYL 10 MG
10 SUPPOSITORY, RECTAL RECTAL DAILY PRN
Status: CANCELLED | OUTPATIENT
Start: 2022-01-01

## 2022-01-01 RX ORDER — BISACODYL 10 MG
10 SUPPOSITORY, RECTAL RECTAL
Status: DISCONTINUED | OUTPATIENT
Start: 2022-01-01 | End: 2022-01-01 | Stop reason: HOSPADM

## 2022-01-01 RX ORDER — HALOPERIDOL 5 MG/ML
0.5 INJECTION INTRAMUSCULAR EVERY 6 HOURS PRN
Status: CANCELLED | OUTPATIENT
Start: 2022-01-01

## 2022-01-01 RX ORDER — BUDESONIDE 0.5 MG/2ML
0.5 INHALANT ORAL 2 TIMES DAILY PRN
Status: CANCELLED | OUTPATIENT
Start: 2022-01-01

## 2022-01-01 RX ORDER — AMOXICILLIN 250 MG
2 CAPSULE ORAL 2 TIMES DAILY PRN
Status: DISCONTINUED | OUTPATIENT
Start: 2022-01-01 | End: 2022-01-01 | Stop reason: HOSPADM

## 2022-01-01 RX ORDER — BUDESONIDE 0.5 MG/2ML
0.5 INHALANT ORAL 2 TIMES DAILY
Qty: 150 ML | Refills: 1 | Status: SHIPPED | OUTPATIENT
Start: 2022-01-01

## 2022-01-01 RX ORDER — SENNOSIDES 8.6 MG
8.6 TABLET ORAL 2 TIMES DAILY
Status: DISCONTINUED | OUTPATIENT
Start: 2022-01-01 | End: 2022-01-01

## 2022-01-01 RX ORDER — IPRATROPIUM BROMIDE AND ALBUTEROL SULFATE 2.5; .5 MG/3ML; MG/3ML
3 SOLUTION RESPIRATORY (INHALATION) EVERY 6 HOURS PRN
Status: CANCELLED | OUTPATIENT
Start: 2022-01-01

## 2022-01-01 RX ORDER — ONDANSETRON 4 MG/1
8 TABLET, FILM COATED ORAL EVERY 8 HOURS PRN
Status: DISCONTINUED | OUTPATIENT
Start: 2022-01-01 | End: 2022-01-01

## 2022-01-01 RX ORDER — CARBOXYMETHYLCELLULOSE SODIUM 5 MG/ML
1-2 SOLUTION/ DROPS OPHTHALMIC
Status: DISCONTINUED | OUTPATIENT
Start: 2022-01-01 | End: 2022-01-01 | Stop reason: HOSPADM

## 2022-01-01 RX ORDER — POT SOR/HE-CELLULOS/POV/HYALUR
1 GEL IN PACKET (ML) MUCOUS MEMBRANE 4 TIMES DAILY
Status: DISCONTINUED | OUTPATIENT
Start: 2022-01-01 | End: 2022-01-01

## 2022-01-01 RX ORDER — FOLIC ACID 1 MG/1
1000 TABLET ORAL DAILY
Qty: 30 TABLET | Refills: 4 | Status: CANCELLED | OUTPATIENT
Start: 2022-01-01

## 2022-01-01 RX ORDER — GUAIFENESIN 600 MG/1
600 TABLET, EXTENDED RELEASE ORAL ONCE
Status: COMPLETED | OUTPATIENT
Start: 2022-01-01 | End: 2022-01-01

## 2022-01-01 RX ORDER — HALOPERIDOL 5 MG/ML
0.5 INJECTION INTRAMUSCULAR EVERY 6 HOURS PRN
Status: DISCONTINUED | OUTPATIENT
Start: 2022-01-01 | End: 2022-01-01 | Stop reason: HOSPADM

## 2022-01-01 RX ORDER — ONDANSETRON 8 MG/1
8 TABLET, FILM COATED ORAL EVERY 8 HOURS PRN
Qty: 30 TABLET | Refills: 2 | Status: SHIPPED | OUTPATIENT
Start: 2022-01-01

## 2022-01-01 RX ORDER — HEPARIN SODIUM (PORCINE) LOCK FLUSH IV SOLN 100 UNIT/ML 100 UNIT/ML
5 SOLUTION INTRAVENOUS
Status: CANCELLED | OUTPATIENT
Start: 2022-01-01

## 2022-01-01 RX ORDER — DIPHENHYDRAMINE HYDROCHLORIDE 50 MG/ML
50 INJECTION INTRAMUSCULAR; INTRAVENOUS
Status: CANCELLED
Start: 2022-01-01

## 2022-01-01 RX ORDER — PREDNISONE 10 MG/1
30 TABLET ORAL DAILY
Qty: 9 TABLET | Refills: 0 | Status: SHIPPED | OUTPATIENT
Start: 2022-01-01 | End: 2022-01-01

## 2022-01-01 RX ORDER — MORPHINE SULFATE 4 MG/ML
3 INJECTION, SOLUTION INTRAMUSCULAR; INTRAVENOUS
Status: DISCONTINUED | OUTPATIENT
Start: 2022-01-01 | End: 2022-01-01 | Stop reason: HOSPADM

## 2022-01-01 RX ORDER — IPRATROPIUM BROMIDE AND ALBUTEROL SULFATE 2.5; .5 MG/3ML; MG/3ML
3 SOLUTION RESPIRATORY (INHALATION) EVERY 6 HOURS PRN
Qty: 90 ML | Refills: 0 | Status: SHIPPED | OUTPATIENT
Start: 2022-01-01

## 2022-01-01 RX ORDER — GLYCOPYRROLATE 0.2 MG/ML
0.2 INJECTION, SOLUTION INTRAMUSCULAR; INTRAVENOUS EVERY 6 HOURS PRN
Status: CANCELLED | OUTPATIENT
Start: 2022-01-01

## 2022-01-01 RX ORDER — MORPHINE SULFATE 4 MG/ML
4 INJECTION, SOLUTION INTRAMUSCULAR; INTRAVENOUS
Status: CANCELLED | OUTPATIENT
Start: 2022-01-01

## 2022-01-01 RX ORDER — PROCHLORPERAZINE MALEATE 10 MG
10 TABLET ORAL EVERY 6 HOURS PRN
Qty: 30 TABLET | Refills: 2 | Status: CANCELLED | OUTPATIENT
Start: 2022-01-01

## 2022-01-01 RX ORDER — ALBUTEROL SULFATE 0.83 MG/ML
2.5 SOLUTION RESPIRATORY (INHALATION) EVERY 4 HOURS PRN
Status: DISCONTINUED | OUTPATIENT
Start: 2022-01-01 | End: 2022-01-01 | Stop reason: HOSPADM

## 2022-01-01 RX ORDER — HEPARIN SODIUM (PORCINE) LOCK FLUSH IV SOLN 100 UNIT/ML 100 UNIT/ML
5-10 SOLUTION INTRAVENOUS
Status: DISCONTINUED | OUTPATIENT
Start: 2022-01-01 | End: 2022-01-01

## 2022-01-01 RX ORDER — FLUCONAZOLE 2 MG/ML
200 INJECTION, SOLUTION INTRAVENOUS EVERY 24 HOURS
Status: COMPLETED | OUTPATIENT
Start: 2022-01-01 | End: 2022-01-01

## 2022-01-01 RX ORDER — ONDANSETRON 2 MG/ML
4 INJECTION INTRAMUSCULAR; INTRAVENOUS EVERY 6 HOURS PRN
Status: CANCELLED | OUTPATIENT
Start: 2022-01-01

## 2022-01-01 RX ORDER — LORAZEPAM 2 MG/ML
1 INJECTION INTRAMUSCULAR
Status: DISCONTINUED | OUTPATIENT
Start: 2022-01-01 | End: 2022-01-01

## 2022-01-01 RX ORDER — GADOBUTROL 604.72 MG/ML
6 INJECTION INTRAVENOUS ONCE
Status: COMPLETED | OUTPATIENT
Start: 2022-01-01 | End: 2022-01-01

## 2022-01-01 RX ORDER — LORAZEPAM 2 MG/ML
0.5 INJECTION INTRAMUSCULAR EVERY 4 HOURS PRN
Status: DISCONTINUED | OUTPATIENT
Start: 2022-01-01 | End: 2022-01-01 | Stop reason: HOSPADM

## 2022-01-01 RX ORDER — LORAZEPAM 2 MG/ML
0.5 INJECTION INTRAMUSCULAR EVERY 4 HOURS PRN
Status: CANCELLED | OUTPATIENT
Start: 2022-01-01

## 2022-01-01 RX ORDER — HEPARIN SODIUM,PORCINE 10 UNIT/ML
5-10 VIAL (ML) INTRAVENOUS
Status: DISCONTINUED | OUTPATIENT
Start: 2022-01-01 | End: 2022-01-01

## 2022-01-01 RX ORDER — ALBUTEROL SULFATE 0.83 MG/ML
2.5 SOLUTION RESPIRATORY (INHALATION)
Status: CANCELLED | OUTPATIENT
Start: 2022-01-01

## 2022-01-01 RX ORDER — GUAIFENESIN 600 MG/1
600 TABLET, EXTENDED RELEASE ORAL 2 TIMES DAILY
Status: DISCONTINUED | OUTPATIENT
Start: 2022-01-01 | End: 2022-01-01 | Stop reason: HOSPADM

## 2022-01-01 RX ORDER — MEPERIDINE HYDROCHLORIDE 25 MG/ML
12.5 INJECTION INTRAMUSCULAR; INTRAVENOUS; SUBCUTANEOUS EVERY 5 MIN PRN
Status: DISCONTINUED | OUTPATIENT
Start: 2022-01-01 | End: 2022-01-01 | Stop reason: HOSPADM

## 2022-01-01 RX ORDER — EPINEPHRINE 1 MG/ML
0.3 INJECTION, SOLUTION INTRAMUSCULAR; SUBCUTANEOUS EVERY 5 MIN PRN
Status: CANCELLED | OUTPATIENT
Start: 2022-01-01

## 2022-01-01 RX ORDER — ONDANSETRON 2 MG/ML
INJECTION INTRAMUSCULAR; INTRAVENOUS PRN
Status: DISCONTINUED | OUTPATIENT
Start: 2022-01-01 | End: 2022-01-01

## 2022-01-01 RX ORDER — IODINE/SODIUM IODIDE 2 %
TINCTURE TOPICAL
Status: CANCELLED | OUTPATIENT
Start: 2022-01-01

## 2022-01-01 RX ORDER — METHYLPREDNISOLONE 4 MG/1
4 TABLET ORAL AT BEDTIME
Status: DISCONTINUED | OUTPATIENT
Start: 2022-01-01 | End: 2022-01-01

## 2022-01-01 RX ORDER — LORAZEPAM 2 MG/ML
1 INJECTION INTRAMUSCULAR
Status: CANCELLED | OUTPATIENT
Start: 2022-01-01

## 2022-01-01 RX ORDER — METHYLPREDNISOLONE 4 MG/1
4 TABLET ORAL ONCE
Status: DISCONTINUED | OUTPATIENT
Start: 2022-01-01 | End: 2022-01-01

## 2022-01-01 RX ORDER — SODIUM CHLORIDE, SODIUM LACTATE, POTASSIUM CHLORIDE, CALCIUM CHLORIDE 600; 310; 30; 20 MG/100ML; MG/100ML; MG/100ML; MG/100ML
INJECTION, SOLUTION INTRAVENOUS CONTINUOUS
Status: DISCONTINUED | OUTPATIENT
Start: 2022-01-01 | End: 2022-01-01 | Stop reason: HOSPADM

## 2022-01-01 RX ORDER — FLUMAZENIL 0.1 MG/ML
0.2 INJECTION, SOLUTION INTRAVENOUS
Status: DISCONTINUED | OUTPATIENT
Start: 2022-01-01 | End: 2022-01-01 | Stop reason: HOSPADM

## 2022-01-01 RX ORDER — CYANOCOBALAMIN 1000 UG/ML
1000 INJECTION, SOLUTION INTRAMUSCULAR; SUBCUTANEOUS ONCE
Status: COMPLETED | OUTPATIENT
Start: 2022-01-01 | End: 2022-01-01

## 2022-01-01 RX ORDER — MORPHINE SULFATE 20 MG/ML
5 SOLUTION ORAL
Status: DISCONTINUED | OUTPATIENT
Start: 2022-01-01 | End: 2022-01-01

## 2022-01-01 RX ORDER — LORAZEPAM 2 MG/ML
1 INJECTION INTRAMUSCULAR EVERY 6 HOURS
Status: DISCONTINUED | OUTPATIENT
Start: 2022-01-01 | End: 2022-01-01

## 2022-01-01 RX ORDER — NICOTINE POLACRILEX 4 MG
15-30 LOZENGE BUCCAL
Status: CANCELLED | OUTPATIENT
Start: 2022-01-01

## 2022-01-01 RX ORDER — MORPHINE SULFATE 10 MG/ML
5 INJECTION, SOLUTION INTRAMUSCULAR; INTRAVENOUS EVERY 6 HOURS
Status: DISCONTINUED | OUTPATIENT
Start: 2022-01-01 | End: 2022-01-01

## 2022-01-01 RX ORDER — IPRATROPIUM BROMIDE AND ALBUTEROL SULFATE 2.5; .5 MG/3ML; MG/3ML
3 SOLUTION RESPIRATORY (INHALATION)
Status: DISCONTINUED | OUTPATIENT
Start: 2022-01-01 | End: 2022-01-01 | Stop reason: HOSPADM

## 2022-01-01 RX ORDER — LABETALOL HYDROCHLORIDE 5 MG/ML
10 INJECTION, SOLUTION INTRAVENOUS
Status: DISCONTINUED | OUTPATIENT
Start: 2022-01-01 | End: 2022-01-01 | Stop reason: HOSPADM

## 2022-01-01 RX ORDER — ACETAMINOPHEN 325 MG/1
650 TABLET ORAL
Status: DISCONTINUED | OUTPATIENT
Start: 2022-01-01 | End: 2022-01-01 | Stop reason: HOSPADM

## 2022-01-01 RX ORDER — MORPHINE SULFATE 10 MG/ML
5 INJECTION, SOLUTION INTRAMUSCULAR; INTRAVENOUS EVERY 6 HOURS
Status: CANCELLED | OUTPATIENT
Start: 2022-01-01

## 2022-01-01 RX ORDER — SCOLOPAMINE TRANSDERMAL SYSTEM 1 MG/1
1 PATCH, EXTENDED RELEASE TRANSDERMAL
Status: CANCELLED | OUTPATIENT
Start: 2022-01-01

## 2022-01-01 RX ORDER — ALBUTEROL SULFATE 90 UG/1
2 AEROSOL, METERED RESPIRATORY (INHALATION) EVERY 4 HOURS PRN
Qty: 18 G | Refills: 6 | Status: SHIPPED | OUTPATIENT
Start: 2022-01-01

## 2022-01-01 RX ORDER — BUDESONIDE 0.5 MG/2ML
0.5 INHALANT ORAL 2 TIMES DAILY
Status: DISCONTINUED | OUTPATIENT
Start: 2022-01-01 | End: 2022-01-01

## 2022-01-01 RX ORDER — FOLIC ACID 1 MG/1
1000 TABLET ORAL DAILY
Qty: 30 TABLET | Refills: 4 | Status: SHIPPED | OUTPATIENT
Start: 2022-01-01 | End: 2022-01-01

## 2022-01-01 RX ORDER — FLUCONAZOLE 2 MG/ML
200 INJECTION, SOLUTION INTRAVENOUS EVERY 24 HOURS
Status: DISCONTINUED | OUTPATIENT
Start: 2022-01-01 | End: 2022-01-01 | Stop reason: HOSPADM

## 2022-01-01 RX ORDER — MORPHINE SULFATE 4 MG/ML
4 INJECTION, SOLUTION INTRAMUSCULAR; INTRAVENOUS
Status: DISCONTINUED | OUTPATIENT
Start: 2022-01-01 | End: 2022-01-01 | Stop reason: HOSPADM

## 2022-01-01 RX ORDER — DIPHENHYDRAMINE HYDROCHLORIDE, ZINC ACETATE 2; .1 G/100G; G/100G
CREAM TOPICAL EVERY 6 HOURS PRN
Status: CANCELLED | OUTPATIENT
Start: 2022-01-01

## 2022-01-01 RX ORDER — PROCHLORPERAZINE MALEATE 10 MG
10 TABLET ORAL EVERY 6 HOURS PRN
Qty: 30 TABLET | Refills: 2 | Status: SHIPPED | OUTPATIENT
Start: 2022-01-01

## 2022-01-01 RX ORDER — GLYCOPYRROLATE 0.2 MG/ML
0.2 INJECTION, SOLUTION INTRAMUSCULAR; INTRAVENOUS EVERY 6 HOURS PRN
Status: DISCONTINUED | OUTPATIENT
Start: 2022-01-01 | End: 2022-01-01 | Stop reason: HOSPADM

## 2022-01-01 RX ORDER — NALOXONE HYDROCHLORIDE 0.4 MG/ML
0.2 INJECTION, SOLUTION INTRAMUSCULAR; INTRAVENOUS; SUBCUTANEOUS
Status: DISCONTINUED | OUTPATIENT
Start: 2022-01-01 | End: 2022-01-01

## 2022-01-01 RX ORDER — CEFTRIAXONE 2 G/1
2 INJECTION, POWDER, FOR SOLUTION INTRAMUSCULAR; INTRAVENOUS ONCE
Status: COMPLETED | OUTPATIENT
Start: 2022-01-01 | End: 2022-01-01

## 2022-01-01 RX ORDER — HYDROMORPHONE HYDROCHLORIDE 1 MG/ML
0.4 INJECTION, SOLUTION INTRAMUSCULAR; INTRAVENOUS; SUBCUTANEOUS EVERY 5 MIN PRN
Status: DISCONTINUED | OUTPATIENT
Start: 2022-01-01 | End: 2022-01-01 | Stop reason: HOSPADM

## 2022-01-01 RX ORDER — AMOXICILLIN 250 MG
1 CAPSULE ORAL 2 TIMES DAILY PRN
Status: DISCONTINUED | OUTPATIENT
Start: 2022-01-01 | End: 2022-01-01 | Stop reason: HOSPADM

## 2022-01-01 RX ORDER — NYSTATIN 100000/ML
500000 SUSPENSION, ORAL (FINAL DOSE FORM) ORAL 4 TIMES DAILY
Status: DISCONTINUED | OUTPATIENT
Start: 2022-01-01 | End: 2022-01-01

## 2022-01-01 RX ORDER — OXYCODONE HYDROCHLORIDE 5 MG/1
5 TABLET ORAL EVERY 4 HOURS PRN
Status: DISCONTINUED | OUTPATIENT
Start: 2022-01-01 | End: 2022-01-01 | Stop reason: HOSPADM

## 2022-01-01 RX ORDER — MORPHINE SULFATE 4 MG/ML
4 INJECTION, SOLUTION INTRAMUSCULAR; INTRAVENOUS
Status: DISCONTINUED | OUTPATIENT
Start: 2022-01-01 | End: 2022-01-01

## 2022-01-01 RX ORDER — HALOPERIDOL 5 MG/ML
1 INJECTION INTRAMUSCULAR
Status: DISCONTINUED | OUTPATIENT
Start: 2022-01-01 | End: 2022-01-01 | Stop reason: HOSPADM

## 2022-01-01 RX ORDER — DIPHENHYDRAMINE HYDROCHLORIDE, ZINC ACETATE 2; .1 G/100G; G/100G
CREAM TOPICAL 3 TIMES DAILY
Status: DISCONTINUED | OUTPATIENT
Start: 2022-01-01 | End: 2022-01-01

## 2022-01-01 RX ORDER — FLUMAZENIL 0.1 MG/ML
0.2 INJECTION, SOLUTION INTRAVENOUS
Status: ACTIVE | OUTPATIENT
Start: 2022-01-01 | End: 2022-01-01

## 2022-01-01 RX ORDER — ONDANSETRON 2 MG/ML
4 INJECTION INTRAMUSCULAR; INTRAVENOUS EVERY 30 MIN PRN
Status: DISCONTINUED | OUTPATIENT
Start: 2022-01-01 | End: 2022-01-01 | Stop reason: HOSPADM

## 2022-01-01 RX ORDER — DEXAMETHASONE SODIUM PHOSPHATE 10 MG/ML
4 INJECTION, SOLUTION INTRAMUSCULAR; INTRAVENOUS 2 TIMES DAILY
Status: COMPLETED | OUTPATIENT
Start: 2022-01-01 | End: 2022-01-01

## 2022-01-01 RX ORDER — DEXAMETHASONE 2 MG/1
TABLET ORAL
Qty: 20 TABLET | Refills: 0 | Status: SHIPPED | OUTPATIENT
Start: 2022-01-01 | End: 2022-01-01

## 2022-01-01 RX ORDER — CYANOCOBALAMIN 1000 UG/ML
1000 INJECTION, SOLUTION INTRAMUSCULAR; SUBCUTANEOUS ONCE
Status: CANCELLED
Start: 2022-01-01 | End: 2022-01-01

## 2022-01-01 RX ORDER — HEPARIN SODIUM,PORCINE 10 UNIT/ML
5-10 VIAL (ML) INTRAVENOUS EVERY 24 HOURS
Status: DISCONTINUED | OUTPATIENT
Start: 2022-01-01 | End: 2022-01-01 | Stop reason: HOSPADM

## 2022-01-01 RX ORDER — MORPHINE SULFATE 2 MG/ML
1-2 INJECTION, SOLUTION INTRAMUSCULAR; INTRAVENOUS
Status: DISCONTINUED | OUTPATIENT
Start: 2022-01-01 | End: 2022-01-01

## 2022-01-01 RX ORDER — HEPARIN SODIUM (PORCINE) LOCK FLUSH IV SOLN 100 UNIT/ML 100 UNIT/ML
5-10 SOLUTION INTRAVENOUS
Status: DISCONTINUED | OUTPATIENT
Start: 2022-01-01 | End: 2022-01-01 | Stop reason: HOSPADM

## 2022-01-01 RX ORDER — IPRATROPIUM BROMIDE AND ALBUTEROL SULFATE 2.5; .5 MG/3ML; MG/3ML
1 SOLUTION RESPIRATORY (INHALATION) 4 TIMES DAILY
Qty: 360 ML | Refills: 11 | Status: SHIPPED | OUTPATIENT
Start: 2022-01-01 | End: 2022-12-03

## 2022-01-01 RX ORDER — PROCHLORPERAZINE MALEATE 10 MG
10 TABLET ORAL EVERY 6 HOURS PRN
Status: DISCONTINUED | OUTPATIENT
Start: 2022-01-01 | End: 2022-01-01

## 2022-01-01 RX ORDER — PIPERACILLIN SODIUM, TAZOBACTAM SODIUM 3; .375 G/15ML; G/15ML
3.38 INJECTION, POWDER, LYOPHILIZED, FOR SOLUTION INTRAVENOUS ONCE
Status: COMPLETED | OUTPATIENT
Start: 2022-01-01 | End: 2022-01-01

## 2022-01-01 RX ORDER — HEPARIN SODIUM,PORCINE 10 UNIT/ML
5-10 VIAL (ML) INTRAVENOUS
Status: DISCONTINUED | OUTPATIENT
Start: 2022-01-01 | End: 2022-01-01 | Stop reason: HOSPADM

## 2022-01-01 RX ORDER — ALBUTEROL SULFATE 90 UG/1
2 AEROSOL, METERED RESPIRATORY (INHALATION) EVERY 6 HOURS PRN
Qty: 18 G | Refills: 0 | Status: SHIPPED | OUTPATIENT
Start: 2022-01-01 | End: 2022-01-01

## 2022-01-01 RX ORDER — ONDANSETRON 4 MG/1
4 TABLET, ORALLY DISINTEGRATING ORAL EVERY 6 HOURS PRN
Status: CANCELLED | OUTPATIENT
Start: 2022-01-01

## 2022-01-01 RX ORDER — FENTANYL CITRATE 50 UG/ML
50 INJECTION, SOLUTION INTRAMUSCULAR; INTRAVENOUS EVERY 5 MIN PRN
Status: DISCONTINUED | OUTPATIENT
Start: 2022-01-01 | End: 2022-01-01 | Stop reason: HOSPADM

## 2022-01-01 RX ORDER — HEPARIN SODIUM (PORCINE) LOCK FLUSH IV SOLN 100 UNIT/ML 100 UNIT/ML
500 SOLUTION INTRAVENOUS ONCE
Status: COMPLETED | OUTPATIENT
Start: 2022-01-01 | End: 2022-01-01

## 2022-01-01 RX ORDER — FOLIC ACID 5 MG/ML
1 INJECTION, SOLUTION INTRAMUSCULAR; INTRAVENOUS; SUBCUTANEOUS DAILY
Status: DISCONTINUED | OUTPATIENT
Start: 2022-01-01 | End: 2022-01-01

## 2022-01-01 RX ORDER — LORAZEPAM 2 MG/ML
1 INJECTION INTRAMUSCULAR EVERY 6 HOURS
Status: CANCELLED | OUTPATIENT
Start: 2022-01-01

## 2022-01-01 RX ORDER — NALOXONE HYDROCHLORIDE 0.4 MG/ML
0.1 INJECTION, SOLUTION INTRAMUSCULAR; INTRAVENOUS; SUBCUTANEOUS
Status: CANCELLED | OUTPATIENT
Start: 2022-01-01

## 2022-01-01 RX ORDER — PIPERACILLIN SODIUM, TAZOBACTAM SODIUM 3; .375 G/15ML; G/15ML
3.38 INJECTION, POWDER, LYOPHILIZED, FOR SOLUTION INTRAVENOUS EVERY 6 HOURS
Status: DISCONTINUED | OUTPATIENT
Start: 2022-01-01 | End: 2022-01-01

## 2022-01-01 RX ORDER — FLUCONAZOLE 2 MG/ML
200 INJECTION, SOLUTION INTRAVENOUS EVERY 24 HOURS
Status: CANCELLED | OUTPATIENT
Start: 2022-01-01 | End: 2022-01-01

## 2022-01-01 RX ORDER — ALBUTEROL SULFATE 90 UG/1
2 AEROSOL, METERED RESPIRATORY (INHALATION) EVERY 6 HOURS PRN
Status: DISCONTINUED | OUTPATIENT
Start: 2022-01-01 | End: 2022-01-01 | Stop reason: HOSPADM

## 2022-01-01 RX ORDER — HYDROXYZINE HYDROCHLORIDE 10 MG/1
10 TABLET, FILM COATED ORAL 3 TIMES DAILY PRN
Status: DISCONTINUED | OUTPATIENT
Start: 2022-01-01 | End: 2022-01-01

## 2022-01-01 RX ORDER — MORPHINE SULFATE 20 MG/ML
5 SOLUTION ORAL EVERY 6 HOURS
Status: DISCONTINUED | OUTPATIENT
Start: 2022-01-01 | End: 2022-01-01

## 2022-01-01 RX ORDER — ENOXAPARIN SODIUM 100 MG/ML
40 INJECTION SUBCUTANEOUS EVERY 24 HOURS
Status: DISCONTINUED | OUTPATIENT
Start: 2022-01-01 | End: 2022-01-01 | Stop reason: HOSPADM

## 2022-01-01 RX ORDER — METHYLPREDNISOLONE 4 MG/1
8 TABLET ORAL ONCE
Status: DISCONTINUED | OUTPATIENT
Start: 2022-01-01 | End: 2022-01-01

## 2022-01-01 RX ORDER — FENTANYL CITRATE 50 UG/ML
25-50 INJECTION, SOLUTION INTRAMUSCULAR; INTRAVENOUS EVERY 5 MIN PRN
Status: DISCONTINUED | OUTPATIENT
Start: 2022-01-01 | End: 2022-01-01 | Stop reason: HOSPADM

## 2022-01-01 RX ORDER — IODINE/SODIUM IODIDE 2 %
TINCTURE TOPICAL 3 TIMES DAILY
Status: DISCONTINUED | OUTPATIENT
Start: 2022-01-01 | End: 2022-01-01

## 2022-01-01 RX ADMIN — MORPHINE SULFATE 5 MG: 10 SOLUTION ORAL at 08:05

## 2022-01-01 RX ADMIN — DIPHENHYDRAMINE HYDROCHLORIDE, ZINC ACETATE: 2; .1 CREAM TOPICAL at 12:31

## 2022-01-01 RX ADMIN — DIPHENHYDRAMINE HYDROCHLORIDE, ZINC ACETATE: 2; .1 CREAM TOPICAL at 00:31

## 2022-01-01 RX ADMIN — MORPHINE SULFATE 1 MG: 2 INJECTION, SOLUTION INTRAMUSCULAR; INTRAVENOUS at 02:28

## 2022-01-01 RX ADMIN — PREDNISONE 30 MG: 20 TABLET ORAL at 10:02

## 2022-01-01 RX ADMIN — MORPHINE SULFATE 5 MG: 20 SOLUTION ORAL at 10:25

## 2022-01-01 RX ADMIN — IPRATROPIUM BROMIDE AND ALBUTEROL SULFATE 3 ML: 2.5; .5 SOLUTION RESPIRATORY (INHALATION) at 08:02

## 2022-01-01 RX ADMIN — FILGRASTIM 300 MCG: 300 INJECTION, SOLUTION INTRAVENOUS; SUBCUTANEOUS at 21:29

## 2022-01-01 RX ADMIN — GLYCOPYRROLATE 0.2 MG: 0.2 INJECTION INTRAMUSCULAR; INTRAVENOUS at 13:29

## 2022-01-01 RX ADMIN — MORPHINE SULFATE 5 MG: 20 SOLUTION ORAL at 12:46

## 2022-01-01 RX ADMIN — APIXABAN 5 MG: 5 TABLET, FILM COATED ORAL at 12:34

## 2022-01-01 RX ADMIN — LORAZEPAM 1 MG: 2 INJECTION INTRAMUSCULAR; INTRAVENOUS at 19:03

## 2022-01-01 RX ADMIN — FOLIC ACID 1 MG: 5 INJECTION, SOLUTION INTRAMUSCULAR; INTRAVENOUS; SUBCUTANEOUS at 09:55

## 2022-01-01 RX ADMIN — SCOPALAMINE 1 PATCH: 1 PATCH, EXTENDED RELEASE TRANSDERMAL at 18:12

## 2022-01-01 RX ADMIN — ENOXAPARIN SODIUM 40 MG: 40 INJECTION SUBCUTANEOUS at 00:12

## 2022-01-01 RX ADMIN — AZITHROMYCIN 500 MG: 500 INJECTION, POWDER, LYOPHILIZED, FOR SOLUTION INTRAVENOUS at 23:19

## 2022-01-01 RX ADMIN — PIPERACILLIN AND TAZOBACTAM 3.38 G: 3; .375 INJECTION, POWDER, LYOPHILIZED, FOR SOLUTION INTRAVENOUS at 12:11

## 2022-01-01 RX ADMIN — MORPHINE SULFATE 2 MG: 2 INJECTION, SOLUTION INTRAMUSCULAR; INTRAVENOUS at 11:43

## 2022-01-01 RX ADMIN — PIPERACILLIN AND TAZOBACTAM 3.38 G: 3; .375 INJECTION, POWDER, LYOPHILIZED, FOR SOLUTION INTRAVENOUS at 03:59

## 2022-01-01 RX ADMIN — FLUCONAZOLE 200 MG: 2 INJECTION, SOLUTION INTRAVENOUS at 16:57

## 2022-01-01 RX ADMIN — IPRATROPIUM BROMIDE AND ALBUTEROL SULFATE 3 ML: 2.5; .5 SOLUTION RESPIRATORY (INHALATION) at 07:43

## 2022-01-01 RX ADMIN — PREDNISONE 30 MG: 20 TABLET ORAL at 08:52

## 2022-01-01 RX ADMIN — IPRATROPIUM BROMIDE AND ALBUTEROL SULFATE 3 ML: 2.5; .5 SOLUTION RESPIRATORY (INHALATION) at 12:05

## 2022-01-01 RX ADMIN — MORPHINE SULFATE 3 MG: 4 INJECTION INTRAVENOUS at 06:08

## 2022-01-01 RX ADMIN — DEXTROSE AND SODIUM CHLORIDE: 5; 900 INJECTION, SOLUTION INTRAVENOUS at 04:03

## 2022-01-01 RX ADMIN — IPRATROPIUM BROMIDE AND ALBUTEROL SULFATE 3 ML: 2.5; .5 SOLUTION RESPIRATORY (INHALATION) at 20:33

## 2022-01-01 RX ADMIN — PHENYLEPHRINE HYDROCHLORIDE 100 MCG: 10 INJECTION INTRAVENOUS at 11:24

## 2022-01-01 RX ADMIN — LORAZEPAM 0.5 MG: 2 INJECTION INTRAMUSCULAR; INTRAVENOUS at 09:03

## 2022-01-01 RX ADMIN — HYDROXYZINE HYDROCHLORIDE 10 MG: 10 TABLET ORAL at 00:49

## 2022-01-01 RX ADMIN — Medication 500 UNITS: at 11:28

## 2022-01-01 RX ADMIN — ENOXAPARIN SODIUM 40 MG: 40 INJECTION SUBCUTANEOUS at 00:26

## 2022-01-01 RX ADMIN — DEXAMETHASONE SODIUM PHOSPHATE 4 MG: 10 INJECTION, SOLUTION INTRAMUSCULAR; INTRAVENOUS at 09:39

## 2022-01-01 RX ADMIN — GUAIFENESIN 600 MG: 600 TABLET, EXTENDED RELEASE ORAL at 07:51

## 2022-01-01 RX ADMIN — SODIUM CHLORIDE 625 MG: 9 INJECTION, SOLUTION INTRAVENOUS at 10:23

## 2022-01-01 RX ADMIN — ALBUTEROL SULFATE 2 PUFF: 90 AEROSOL, METERED RESPIRATORY (INHALATION) at 05:54

## 2022-01-01 RX ADMIN — MORPHINE SULFATE 5 MG: 20 SOLUTION ORAL at 22:13

## 2022-01-01 RX ADMIN — ENOXAPARIN SODIUM 40 MG: 40 INJECTION SUBCUTANEOUS at 00:18

## 2022-01-01 RX ADMIN — MORPHINE SULFATE 2 MG: 2 INJECTION, SOLUTION INTRAMUSCULAR; INTRAVENOUS at 05:58

## 2022-01-01 RX ADMIN — MORPHINE SULFATE 1 MG: 2 INJECTION, SOLUTION INTRAMUSCULAR; INTRAVENOUS at 18:27

## 2022-01-01 RX ADMIN — DIPHENHYDRAMINE HYDROCHLORIDE, ZINC ACETATE 1 APPLICATOR: 2; .1 CREAM TOPICAL at 16:22

## 2022-01-01 RX ADMIN — SUGAMMADEX 200 MG: 100 INJECTION, SOLUTION INTRAVENOUS at 11:39

## 2022-01-01 RX ADMIN — PIPERACILLIN AND TAZOBACTAM 3.38 G: 3; .375 INJECTION, POWDER, LYOPHILIZED, FOR SOLUTION INTRAVENOUS at 18:39

## 2022-01-01 RX ADMIN — MORPHINE SULFATE 2 MG: 2 INJECTION, SOLUTION INTRAMUSCULAR; INTRAVENOUS at 12:31

## 2022-01-01 RX ADMIN — MORPHINE SULFATE 3 MG: 4 INJECTION INTRAVENOUS at 00:28

## 2022-01-01 RX ADMIN — MORPHINE SULFATE 3 MG: 10 SOLUTION ORAL at 21:59

## 2022-01-01 RX ADMIN — DEXAMETHASONE SODIUM PHOSPHATE 4 MG: 10 INJECTION, SOLUTION INTRAMUSCULAR; INTRAVENOUS at 16:09

## 2022-01-01 RX ADMIN — PROPOFOL 200 MCG/KG/MIN: 10 INJECTION, EMULSION INTRAVENOUS at 11:14

## 2022-01-01 RX ADMIN — MORPHINE SULFATE 2 MG: 2 INJECTION, SOLUTION INTRAMUSCULAR; INTRAVENOUS at 05:26

## 2022-01-01 RX ADMIN — LORAZEPAM 0.5 MG: 2 INJECTION INTRAMUSCULAR; INTRAVENOUS at 13:19

## 2022-01-01 RX ADMIN — GUAIFENESIN 600 MG: 600 TABLET, EXTENDED RELEASE ORAL at 00:17

## 2022-01-01 RX ADMIN — HYDROMORPHONE HYDROCHLORIDE 0.2 MG: 0.2 INJECTION, SOLUTION INTRAMUSCULAR; INTRAVENOUS; SUBCUTANEOUS at 03:55

## 2022-01-01 RX ADMIN — Medication 2 G: at 14:36

## 2022-01-01 RX ADMIN — IPRATROPIUM BROMIDE AND ALBUTEROL SULFATE 3 ML: 2.5; .5 SOLUTION RESPIRATORY (INHALATION) at 10:07

## 2022-01-01 RX ADMIN — DIPHENHYDRAMINE HYDROCHLORIDE, ZINC ACETATE: 2; .1 CREAM TOPICAL at 12:27

## 2022-01-01 RX ADMIN — FENTANYL CITRATE 25 MCG: 50 INJECTION INTRAMUSCULAR; INTRAVENOUS at 14:59

## 2022-01-01 RX ADMIN — APIXABAN 5 MG: 5 TABLET, FILM COATED ORAL at 21:43

## 2022-01-01 RX ADMIN — MORPHINE SULFATE 3 MG: 4 INJECTION INTRAVENOUS at 18:09

## 2022-01-01 RX ADMIN — GLYCOPYRROLATE 0.2 MG: 0.2 INJECTION INTRAMUSCULAR; INTRAVENOUS at 06:08

## 2022-01-01 RX ADMIN — GABAPENTIN 100 MG: 250 SOLUTION ORAL at 21:18

## 2022-01-01 RX ADMIN — FENTANYL CITRATE 50 MCG: 50 INJECTION INTRAMUSCULAR; INTRAVENOUS at 14:47

## 2022-01-01 RX ADMIN — DEXAMETHASONE SODIUM PHOSPHATE 4 MG: 10 INJECTION INTRAMUSCULAR; INTRAVENOUS at 09:54

## 2022-01-01 RX ADMIN — GUAIFENESIN 600 MG: 600 TABLET, EXTENDED RELEASE ORAL at 08:43

## 2022-01-01 RX ADMIN — PIPERACILLIN AND TAZOBACTAM 3.38 G: 3; .375 INJECTION, POWDER, LYOPHILIZED, FOR SOLUTION INTRAVENOUS at 11:10

## 2022-01-01 RX ADMIN — HYDROMORPHONE HYDROCHLORIDE 0.2 MG: 0.2 INJECTION, SOLUTION INTRAMUSCULAR; INTRAVENOUS; SUBCUTANEOUS at 08:15

## 2022-01-01 RX ADMIN — IPRATROPIUM BROMIDE AND ALBUTEROL SULFATE 3 ML: 2.5; .5 SOLUTION RESPIRATORY (INHALATION) at 20:00

## 2022-01-01 RX ADMIN — DIPHENHYDRAMINE HYDROCHLORIDE, ZINC ACETATE: 2; .1 CREAM TOPICAL at 06:39

## 2022-01-01 RX ADMIN — ACETAMINOPHEN 650 MG: 160 SOLUTION ORAL at 21:43

## 2022-01-01 RX ADMIN — IPRATROPIUM BROMIDE AND ALBUTEROL SULFATE 3 ML: 2.5; .5 SOLUTION RESPIRATORY (INHALATION) at 11:40

## 2022-01-01 RX ADMIN — IPRATROPIUM BROMIDE AND ALBUTEROL SULFATE 3 ML: 2.5; .5 SOLUTION RESPIRATORY (INHALATION) at 12:29

## 2022-01-01 RX ADMIN — FLUCONAZOLE 200 MG: 2 INJECTION, SOLUTION INTRAVENOUS at 17:58

## 2022-01-01 RX ADMIN — LORAZEPAM 0.5 MG: 2 INJECTION INTRAMUSCULAR; INTRAVENOUS at 02:40

## 2022-01-01 RX ADMIN — FILGRASTIM 300 MCG: 300 INJECTION, SOLUTION INTRAVENOUS; SUBCUTANEOUS at 21:18

## 2022-01-01 RX ADMIN — FLUDEOXYGLUCOSE F-18 10.17 MCI.: 500 INJECTION, SOLUTION INTRAVENOUS at 09:29

## 2022-01-01 RX ADMIN — GLYCOPYRROLATE 0.2 MG: 0.2 INJECTION INTRAMUSCULAR; INTRAVENOUS at 13:01

## 2022-01-01 RX ADMIN — DIPHENHYDRAMINE HYDROCHLORIDE, ZINC ACETATE: 2; .1 CREAM TOPICAL at 09:55

## 2022-01-01 RX ADMIN — IPRATROPIUM BROMIDE AND ALBUTEROL SULFATE 3 ML: 2.5; .5 SOLUTION RESPIRATORY (INHALATION) at 07:57

## 2022-01-01 RX ADMIN — DEXTROSE AND SODIUM CHLORIDE: 5; 900 INJECTION, SOLUTION INTRAVENOUS at 07:47

## 2022-01-01 RX ADMIN — ENOXAPARIN SODIUM 60 MG: 60 INJECTION SUBCUTANEOUS at 13:07

## 2022-01-01 RX ADMIN — IPRATROPIUM BROMIDE AND ALBUTEROL SULFATE 3 ML: 2.5; .5 SOLUTION RESPIRATORY (INHALATION) at 15:45

## 2022-01-01 RX ADMIN — MORPHINE SULFATE 2 MG: 2 INJECTION, SOLUTION INTRAMUSCULAR; INTRAVENOUS at 09:54

## 2022-01-01 RX ADMIN — FLUCONAZOLE 200 MG: 2 INJECTION, SOLUTION INTRAVENOUS at 19:10

## 2022-01-01 RX ADMIN — ENOXAPARIN SODIUM 60 MG: 60 INJECTION SUBCUTANEOUS at 18:53

## 2022-01-01 RX ADMIN — IOPAMIDOL 100 ML: 755 INJECTION, SOLUTION INTRAVENOUS at 22:28

## 2022-01-01 RX ADMIN — CYANOCOBALAMIN 1000 MCG: 1000 INJECTION, SOLUTION INTRAMUSCULAR at 09:02

## 2022-01-01 RX ADMIN — AMOXICILLIN AND CLAVULANATE POTASSIUM 1 TABLET: 875; 125 TABLET, FILM COATED ORAL at 10:03

## 2022-01-01 RX ADMIN — GLYCOPYRROLATE 0.2 MG: 0.2 INJECTION INTRAMUSCULAR; INTRAVENOUS at 06:34

## 2022-01-01 RX ADMIN — PIPERACILLIN AND TAZOBACTAM 3.38 G: 3; .375 INJECTION, POWDER, LYOPHILIZED, FOR SOLUTION INTRAVENOUS at 02:26

## 2022-01-01 RX ADMIN — IPRATROPIUM BROMIDE AND ALBUTEROL SULFATE 3 ML: 2.5; .5 SOLUTION RESPIRATORY (INHALATION) at 20:40

## 2022-01-01 RX ADMIN — MORPHINE SULFATE 2 MG: 2 INJECTION, SOLUTION INTRAMUSCULAR; INTRAVENOUS at 21:08

## 2022-01-01 RX ADMIN — LORAZEPAM 1 MG: 2 INJECTION INTRAMUSCULAR; INTRAVENOUS at 12:24

## 2022-01-01 RX ADMIN — ALBUTEROL SULFATE 2 PUFF: 90 AEROSOL, METERED RESPIRATORY (INHALATION) at 00:25

## 2022-01-01 RX ADMIN — PIPERACILLIN AND TAZOBACTAM 3.38 G: 3; .375 INJECTION, POWDER, LYOPHILIZED, FOR SOLUTION INTRAVENOUS at 19:02

## 2022-01-01 RX ADMIN — PHENYLEPHRINE HYDROCHLORIDE 100 MCG: 10 INJECTION INTRAVENOUS at 11:36

## 2022-01-01 RX ADMIN — FILGRASTIM 300 MCG: 300 INJECTION, SOLUTION INTRAVENOUS; SUBCUTANEOUS at 13:20

## 2022-01-01 RX ADMIN — SODIUM CHLORIDE 200 MG: 9 INJECTION, SOLUTION INTRAVENOUS at 09:49

## 2022-01-01 RX ADMIN — MORPHINE SULFATE 2 MG: 2 INJECTION, SOLUTION INTRAMUSCULAR; INTRAVENOUS at 05:47

## 2022-01-01 RX ADMIN — ALBUTEROL SULFATE 2 PUFF: 90 AEROSOL, METERED RESPIRATORY (INHALATION) at 12:33

## 2022-01-01 RX ADMIN — DEXTROSE AND SODIUM CHLORIDE: 5; 900 INJECTION, SOLUTION INTRAVENOUS at 12:34

## 2022-01-01 RX ADMIN — DIPHENHYDRAMINE HYDROCHLORIDE AND LIDOCAINE HYDROCHLORIDE AND ALUMINUM HYDROXIDE AND MAGNESIUM HYDRO 10 ML: KIT at 21:51

## 2022-01-01 RX ADMIN — CARBOPLATIN 395 MG: 10 INJECTION INTRAVENOUS at 10:48

## 2022-01-01 RX ADMIN — IPRATROPIUM BROMIDE AND ALBUTEROL SULFATE 3 ML: 2.5; .5 SOLUTION RESPIRATORY (INHALATION) at 08:53

## 2022-01-01 RX ADMIN — FLUCONAZOLE 200 MG: 2 INJECTION, SOLUTION INTRAVENOUS at 18:20

## 2022-01-01 RX ADMIN — HYDROMORPHONE HYDROCHLORIDE 0.2 MG: 0.2 INJECTION, SOLUTION INTRAMUSCULAR; INTRAVENOUS; SUBCUTANEOUS at 13:13

## 2022-01-01 RX ADMIN — MORPHINE SULFATE 5 MG: 20 SOLUTION ORAL at 12:04

## 2022-01-01 RX ADMIN — PHENYLEPHRINE HYDROCHLORIDE 100 MCG: 10 INJECTION INTRAVENOUS at 11:31

## 2022-01-01 RX ADMIN — FOLIC ACID 1 MG: 5 INJECTION, SOLUTION INTRAMUSCULAR; INTRAVENOUS; SUBCUTANEOUS at 08:06

## 2022-01-01 RX ADMIN — FLUCONAZOLE 200 MG: 2 INJECTION, SOLUTION INTRAVENOUS at 17:11

## 2022-01-01 RX ADMIN — LIDOCAINE HYDROCHLORIDE 30 MG: 10 INJECTION, SOLUTION INFILTRATION; PERINEURAL at 11:14

## 2022-01-01 RX ADMIN — ENOXAPARIN SODIUM 40 MG: 40 INJECTION SUBCUTANEOUS at 00:55

## 2022-01-01 RX ADMIN — GUAIFENESIN 600 MG: 600 TABLET, EXTENDED RELEASE ORAL at 21:00

## 2022-01-01 RX ADMIN — DEXAMETHASONE SODIUM PHOSPHATE: 10 INJECTION, SOLUTION INTRAMUSCULAR; INTRAVENOUS at 09:21

## 2022-01-01 RX ADMIN — IPRATROPIUM BROMIDE AND ALBUTEROL SULFATE 3 ML: 2.5; .5 SOLUTION RESPIRATORY (INHALATION) at 15:11

## 2022-01-01 RX ADMIN — DIPHENHYDRAMINE HYDROCHLORIDE AND LIDOCAINE HYDROCHLORIDE AND ALUMINUM HYDROXIDE AND MAGNESIUM HYDRO 10 ML: KIT at 16:23

## 2022-01-01 RX ADMIN — IPRATROPIUM BROMIDE AND ALBUTEROL SULFATE 3 ML: 2.5; .5 SOLUTION RESPIRATORY (INHALATION) at 12:11

## 2022-01-01 RX ADMIN — DIPHENHYDRAMINE HYDROCHLORIDE, ZINC ACETATE 1 APPLICATOR: 2; .1 CREAM TOPICAL at 16:09

## 2022-01-01 RX ADMIN — MORPHINE SULFATE 5 MG: 20 SOLUTION ORAL at 04:18

## 2022-01-01 RX ADMIN — LORAZEPAM 0.5 MG: 2 INJECTION INTRAMUSCULAR; INTRAVENOUS at 20:17

## 2022-01-01 RX ADMIN — FLUCONAZOLE 200 MG: 2 INJECTION, SOLUTION INTRAVENOUS at 16:24

## 2022-01-01 RX ADMIN — ROCURONIUM BROMIDE 30 MG: 50 INJECTION, SOLUTION INTRAVENOUS at 11:14

## 2022-01-01 RX ADMIN — DEXTROSE AND SODIUM CHLORIDE: 5; 900 INJECTION, SOLUTION INTRAVENOUS at 17:59

## 2022-01-01 RX ADMIN — ALBUTEROL SULFATE 2 PUFF: 90 AEROSOL, METERED RESPIRATORY (INHALATION) at 17:13

## 2022-01-01 RX ADMIN — IPRATROPIUM BROMIDE AND ALBUTEROL SULFATE 3 ML: 2.5; .5 SOLUTION RESPIRATORY (INHALATION) at 02:47

## 2022-01-01 RX ADMIN — IOPAMIDOL 100 ML: 755 INJECTION, SOLUTION INTRAVENOUS at 17:31

## 2022-01-01 RX ADMIN — CEFTRIAXONE SODIUM 2 G: 2 INJECTION, POWDER, FOR SOLUTION INTRAMUSCULAR; INTRAVENOUS at 22:37

## 2022-01-01 RX ADMIN — MIDAZOLAM 0.5 MG: 1 INJECTION INTRAMUSCULAR; INTRAVENOUS at 14:35

## 2022-01-01 RX ADMIN — POTASSIUM CHLORIDE, DEXTROSE MONOHYDRATE AND SODIUM CHLORIDE: 150; 5; 900 INJECTION, SOLUTION INTRAVENOUS at 04:59

## 2022-01-01 RX ADMIN — MORPHINE SULFATE 2 MG: 2 INJECTION, SOLUTION INTRAMUSCULAR; INTRAVENOUS at 16:44

## 2022-01-01 RX ADMIN — POTASSIUM CHLORIDE, DEXTROSE MONOHYDRATE AND SODIUM CHLORIDE: 150; 5; 900 INJECTION, SOLUTION INTRAVENOUS at 16:08

## 2022-01-01 RX ADMIN — Medication 500 UNITS: at 15:06

## 2022-01-01 RX ADMIN — MORPHINE SULFATE 2 MG: 2 INJECTION, SOLUTION INTRAMUSCULAR; INTRAVENOUS at 17:14

## 2022-01-01 RX ADMIN — IPRATROPIUM BROMIDE AND ALBUTEROL SULFATE 3 ML: 2.5; .5 SOLUTION RESPIRATORY (INHALATION) at 20:37

## 2022-01-01 RX ADMIN — FERRIC OXIDE RED 1 ML: 8; 8 LOTION TOPICAL at 21:46

## 2022-01-01 RX ADMIN — DIPHENHYDRAMINE HYDROCHLORIDE, ZINC ACETATE 1 APPLICATOR: 2; .1 CREAM TOPICAL at 09:24

## 2022-01-01 RX ADMIN — FLUCONAZOLE 200 MG: 2 INJECTION, SOLUTION INTRAVENOUS at 17:07

## 2022-01-01 RX ADMIN — GUAIFENESIN 600 MG: 600 TABLET, EXTENDED RELEASE ORAL at 21:37

## 2022-01-01 RX ADMIN — DIPHENHYDRAMINE HYDROCHLORIDE, ZINC ACETATE: 2; .1 CREAM TOPICAL at 19:54

## 2022-01-01 RX ADMIN — FENTANYL CITRATE 50 MCG: 50 INJECTION, SOLUTION INTRAMUSCULAR; INTRAVENOUS at 11:14

## 2022-01-01 RX ADMIN — FERRIC OXIDE RED 1 ML: 8; 8 LOTION TOPICAL at 16:22

## 2022-01-01 RX ADMIN — PALONOSETRON 0.25 MG: 0.05 INJECTION, SOLUTION INTRAVENOUS at 09:03

## 2022-01-01 RX ADMIN — FLUCONAZOLE 200 MG: 2 INJECTION, SOLUTION INTRAVENOUS at 16:40

## 2022-01-01 RX ADMIN — DIPHENHYDRAMINE HYDROCHLORIDE AND LIDOCAINE HYDROCHLORIDE AND ALUMINUM HYDROXIDE AND MAGNESIUM HYDRO 10 ML: KIT at 09:17

## 2022-01-01 RX ADMIN — MORPHINE SULFATE 3 MG: 4 INJECTION INTRAVENOUS at 11:03

## 2022-01-01 RX ADMIN — DEXTROSE MONOHYDRATE 25 ML: 25 INJECTION, SOLUTION INTRAVENOUS at 12:27

## 2022-01-01 RX ADMIN — FOLIC ACID 1 MG: 5 INJECTION, SOLUTION INTRAMUSCULAR; INTRAVENOUS; SUBCUTANEOUS at 16:20

## 2022-01-01 RX ADMIN — HYDROXYZINE HYDROCHLORIDE 10 MG: 10 TABLET ORAL at 11:01

## 2022-01-01 RX ADMIN — FENTANYL CITRATE 25 MCG: 50 INJECTION INTRAMUSCULAR; INTRAVENOUS at 15:05

## 2022-01-01 RX ADMIN — LIDOCAINE HYDROCHLORIDE 15 ML: 10 INJECTION, SOLUTION EPIDURAL; INFILTRATION; INTRACAUDAL; PERINEURAL at 15:02

## 2022-01-01 RX ADMIN — GABAPENTIN 100 MG: 250 SOLUTION ORAL at 21:48

## 2022-01-01 RX ADMIN — SODIUM CHLORIDE, POTASSIUM CHLORIDE, SODIUM LACTATE AND CALCIUM CHLORIDE 1000 ML: 600; 310; 30; 20 INJECTION, SOLUTION INTRAVENOUS at 13:32

## 2022-01-01 RX ADMIN — GUAIFENESIN 600 MG: 600 TABLET, EXTENDED RELEASE ORAL at 09:09

## 2022-01-01 RX ADMIN — AMOXICILLIN AND CLAVULANATE POTASSIUM 1 TABLET: 875; 125 TABLET, FILM COATED ORAL at 21:11

## 2022-01-01 RX ADMIN — MORPHINE SULFATE 5 MG: 20 SOLUTION ORAL at 17:06

## 2022-01-01 RX ADMIN — GABAPENTIN 100 MG: 250 SOLUTION ORAL at 21:43

## 2022-01-01 RX ADMIN — PIPERACILLIN AND TAZOBACTAM 3.38 G: 3; .375 INJECTION, POWDER, LYOPHILIZED, FOR SOLUTION INTRAVENOUS at 02:51

## 2022-01-01 RX ADMIN — MORPHINE SULFATE 5 MG: 20 SOLUTION ORAL at 15:08

## 2022-01-01 RX ADMIN — FERRIC OXIDE RED: 8; 8 LOTION TOPICAL at 16:09

## 2022-01-01 RX ADMIN — DIPHENHYDRAMINE HYDROCHLORIDE AND LIDOCAINE HYDROCHLORIDE AND ALUMINUM HYDROXIDE AND MAGNESIUM HYDRO 10 ML: KIT at 11:02

## 2022-01-01 RX ADMIN — HYDROMORPHONE HYDROCHLORIDE 0.2 MG: 0.2 INJECTION, SOLUTION INTRAMUSCULAR; INTRAVENOUS; SUBCUTANEOUS at 22:37

## 2022-01-01 RX ADMIN — GUAIFENESIN 600 MG: 600 TABLET, EXTENDED RELEASE ORAL at 19:13

## 2022-01-01 RX ADMIN — LORAZEPAM 1 MG: 2 INJECTION INTRAMUSCULAR; INTRAVENOUS at 06:34

## 2022-01-01 RX ADMIN — ONDANSETRON 4 MG: 2 INJECTION INTRAMUSCULAR; INTRAVENOUS at 11:39

## 2022-01-01 RX ADMIN — MORPHINE SULFATE 4 MG: 4 INJECTION INTRAVENOUS at 17:25

## 2022-01-01 RX ADMIN — GADOBUTROL 6 ML: 604.72 INJECTION INTRAVENOUS at 16:55

## 2022-01-01 RX ADMIN — GUAIFENESIN 600 MG: 600 TABLET, EXTENDED RELEASE ORAL at 10:03

## 2022-01-01 RX ADMIN — ALBUTEROL SULFATE 2 PUFF: 90 AEROSOL, METERED RESPIRATORY (INHALATION) at 09:03

## 2022-01-01 RX ADMIN — SCOPALAMINE 1 PATCH: 1 PATCH, EXTENDED RELEASE TRANSDERMAL at 19:09

## 2022-01-01 RX ADMIN — IPRATROPIUM BROMIDE AND ALBUTEROL SULFATE 3 ML: 2.5; .5 SOLUTION RESPIRATORY (INHALATION) at 16:00

## 2022-01-01 RX ADMIN — SODIUM CHLORIDE 250 ML: 9 INJECTION, SOLUTION INTRAVENOUS at 09:02

## 2022-01-01 RX ADMIN — DIPHENHYDRAMINE HYDROCHLORIDE AND LIDOCAINE HYDROCHLORIDE AND ALUMINUM HYDROXIDE AND MAGNESIUM HYDRO 10 ML: KIT at 13:43

## 2022-01-01 RX ADMIN — DEXAMETHASONE SODIUM PHOSPHATE 4 MG: 10 INJECTION INTRAMUSCULAR; INTRAVENOUS at 10:17

## 2022-01-01 RX ADMIN — GABAPENTIN 100 MG: 250 SOLUTION ORAL at 21:32

## 2022-01-01 RX ADMIN — GUAIFENESIN 600 MG: 600 TABLET, EXTENDED RELEASE ORAL at 08:52

## 2022-01-01 RX ADMIN — FERRIC OXIDE RED 1 ML: 8; 8 LOTION TOPICAL at 09:24

## 2022-01-01 RX ADMIN — HYDROMORPHONE HYDROCHLORIDE 0.2 MG: 0.2 INJECTION, SOLUTION INTRAMUSCULAR; INTRAVENOUS; SUBCUTANEOUS at 12:44

## 2022-01-01 RX ADMIN — GLYCOPYRROLATE 0.2 MG: 0.2 INJECTION INTRAMUSCULAR; INTRAVENOUS at 05:46

## 2022-01-01 RX ADMIN — PIPERACILLIN AND TAZOBACTAM 3.38 G: 3; .375 INJECTION, POWDER, LYOPHILIZED, FOR SOLUTION INTRAVENOUS at 05:35

## 2022-01-01 RX ADMIN — PREDNISONE 30 MG: 20 TABLET ORAL at 12:34

## 2022-01-01 RX ADMIN — LORAZEPAM 1 MG: 2 INJECTION INTRAMUSCULAR; INTRAVENOUS at 15:52

## 2022-01-01 RX ADMIN — PIPERACILLIN AND TAZOBACTAM 3.38 G: 3; .375 INJECTION, POWDER, LYOPHILIZED, FOR SOLUTION INTRAVENOUS at 19:12

## 2022-01-01 RX ADMIN — ENOXAPARIN SODIUM 40 MG: 40 INJECTION SUBCUTANEOUS at 23:23

## 2022-01-01 RX ADMIN — BUDESONIDE 0.5 MG: 0.5 INHALANT ORAL at 13:50

## 2022-01-01 RX ADMIN — AMOXICILLIN AND CLAVULANATE POTASSIUM 1 TABLET: 875; 125 TABLET, FILM COATED ORAL at 08:52

## 2022-01-01 RX ADMIN — GUAIFENESIN 600 MG: 600 TABLET, EXTENDED RELEASE ORAL at 21:11

## 2022-01-01 RX ADMIN — SODIUM CHLORIDE, POTASSIUM CHLORIDE, SODIUM LACTATE AND CALCIUM CHLORIDE: 600; 310; 30; 20 INJECTION, SOLUTION INTRAVENOUS at 10:50

## 2022-01-01 RX ADMIN — FOLIC ACID 1 MG: 5 INJECTION, SOLUTION INTRAMUSCULAR; INTRAVENOUS; SUBCUTANEOUS at 08:35

## 2022-01-01 RX ADMIN — MORPHINE SULFATE 2 MG: 2 INJECTION, SOLUTION INTRAMUSCULAR; INTRAVENOUS at 01:11

## 2022-01-01 RX ADMIN — MORPHINE SULFATE 5 MG: 10 INJECTION INTRAVENOUS at 01:03

## 2022-01-01 RX ADMIN — GLYCOPYRROLATE 0.2 MG: 0.2 INJECTION INTRAMUSCULAR; INTRAVENOUS at 22:53

## 2022-01-01 RX ADMIN — DEXAMETHASONE SODIUM PHOSPHATE 4 MG: 10 INJECTION INTRAMUSCULAR; INTRAVENOUS at 08:01

## 2022-01-01 RX ADMIN — MORPHINE SULFATE 4 MG: 4 INJECTION INTRAVENOUS at 23:39

## 2022-01-01 RX ADMIN — NYSTATIN 500000 UNITS: 100000 SUSPENSION ORAL at 11:01

## 2022-01-01 RX ADMIN — ACETAMINOPHEN 650 MG: 325 TABLET, FILM COATED ORAL at 12:54

## 2022-01-01 RX ADMIN — FERRIC OXIDE RED: 8; 8 LOTION TOPICAL at 09:56

## 2022-01-01 RX ADMIN — IPRATROPIUM BROMIDE AND ALBUTEROL SULFATE 3 ML: 2.5; .5 SOLUTION RESPIRATORY (INHALATION) at 11:25

## 2022-01-01 ASSESSMENT — ACTIVITIES OF DAILY LIVING (ADL)
ADLS_ACUITY_SCORE: 37
ADLS_ACUITY_SCORE: 35
ADLS_ACUITY_SCORE: 22
ADLS_ACUITY_SCORE: 31
ADLS_ACUITY_SCORE: 57
EATING/SWALLOWING: SWALLOWING LIQUIDS;SWALLOWING SOLID FOOD
ADLS_ACUITY_SCORE: 35
ADLS_ACUITY_SCORE: 57
ADLS_ACUITY_SCORE: 31
ADLS_ACUITY_SCORE: 52
ADLS_ACUITY_SCORE: 52
ADLS_ACUITY_SCORE: 33
DEPENDENT_IADLS:: INDEPENDENT
ADLS_ACUITY_SCORE: 27
ADLS_ACUITY_SCORE: 49
ADLS_ACUITY_SCORE: 35
ADLS_ACUITY_SCORE: 32
ADLS_ACUITY_SCORE: 22
ADLS_ACUITY_SCORE: 57
ADLS_ACUITY_SCORE: 27
ADLS_ACUITY_SCORE: 27
ADLS_ACUITY_SCORE: 37
ADLS_ACUITY_SCORE: 22
ADLS_ACUITY_SCORE: 27
ADLS_ACUITY_SCORE: 51
ADLS_ACUITY_SCORE: 51
ADLS_ACUITY_SCORE: 35
VISION_MANAGEMENT: WEAR GLASSES
ADLS_ACUITY_SCORE: 52
ADLS_ACUITY_SCORE: 22
ADLS_ACUITY_SCORE: 51
ADLS_ACUITY_SCORE: 22
CHANGE_IN_FUNCTIONAL_STATUS_SINCE_ONSET_OF_CURRENT_ILLNESS/INJURY: NO
VISION_MANAGEMENT: GLASSES
ADLS_ACUITY_SCORE: 35
ADLS_ACUITY_SCORE: 33
ADLS_ACUITY_SCORE: 22
ADLS_ACUITY_SCORE: 31
CONCENTRATING,_REMEMBERING_OR_MAKING_DECISIONS_DIFFICULTY: NO
ADLS_ACUITY_SCORE: 35
ADLS_ACUITY_SCORE: 31
ADLS_ACUITY_SCORE: 41
ADLS_ACUITY_SCORE: 22
ADLS_ACUITY_SCORE: 32
ADLS_ACUITY_SCORE: 57
ADLS_ACUITY_SCORE: 31
ADLS_ACUITY_SCORE: 27
ADLS_ACUITY_SCORE: 35
ADLS_ACUITY_SCORE: 31
ADLS_ACUITY_SCORE: 31
ADLS_ACUITY_SCORE: 52
ADLS_ACUITY_SCORE: 27
ADLS_ACUITY_SCORE: 37
ADLS_ACUITY_SCORE: 35
ADLS_ACUITY_SCORE: 37
FALL_HISTORY_WITHIN_LAST_SIX_MONTHS: NO
SWALLOWING: 2-->DIFFICULTY SWALLOWING LIQUIDS/FOODS
ADLS_ACUITY_SCORE: 27
ADLS_ACUITY_SCORE: 32
ADLS_ACUITY_SCORE: 37
ADLS_ACUITY_SCORE: 22
WEAR_GLASSES_OR_BLIND: YES
ADLS_ACUITY_SCORE: 31
WALKING_OR_CLIMBING_STAIRS_DIFFICULTY: NO
ADLS_ACUITY_SCORE: 53
ADLS_ACUITY_SCORE: 53
ADLS_ACUITY_SCORE: 22
DIFFICULTY_EATING/SWALLOWING: NO
ADLS_ACUITY_SCORE: 41
ADLS_ACUITY_SCORE: 22
ADLS_ACUITY_SCORE: 27
ADLS_ACUITY_SCORE: 35
ADLS_ACUITY_SCORE: 35
DOING_ERRANDS_INDEPENDENTLY_DIFFICULTY: YES
ADLS_ACUITY_SCORE: 31
ADLS_ACUITY_SCORE: 57
ADLS_ACUITY_SCORE: 57
ADLS_ACUITY_SCORE: 35
ADLS_ACUITY_SCORE: 22
ADLS_ACUITY_SCORE: 37
ADLS_ACUITY_SCORE: 27
ADLS_ACUITY_SCORE: 37
ADLS_ACUITY_SCORE: 22
ADLS_ACUITY_SCORE: 27
DRESSING/BATHING_DIFFICULTY: NO
ADLS_ACUITY_SCORE: 28
ADLS_ACUITY_SCORE: 22
ADLS_ACUITY_SCORE: 37
ADLS_ACUITY_SCORE: 32
ADLS_ACUITY_SCORE: 37
ADLS_ACUITY_SCORE: 22
ADLS_ACUITY_SCORE: 32
ADLS_ACUITY_SCORE: 22
ADLS_ACUITY_SCORE: 27
ADLS_ACUITY_SCORE: 33
ADLS_ACUITY_SCORE: 22
ADLS_ACUITY_SCORE: 27
ADLS_ACUITY_SCORE: 51
ADLS_ACUITY_SCORE: 32
ADLS_ACUITY_SCORE: 22
ADLS_ACUITY_SCORE: 37
ADLS_ACUITY_SCORE: 31
ADLS_ACUITY_SCORE: 52
ADLS_ACUITY_SCORE: 22
ADLS_ACUITY_SCORE: 33
ADLS_ACUITY_SCORE: 22
DOING_ERRANDS_INDEPENDENTLY_DIFFICULTY: NO
ADLS_ACUITY_SCORE: 49
ADLS_ACUITY_SCORE: 22
ADLS_ACUITY_SCORE: 49
ADLS_ACUITY_SCORE: 52
CONCENTRATING,_REMEMBERING_OR_MAKING_DECISIONS_DIFFICULTY: NO
ADLS_ACUITY_SCORE: 53
ADLS_ACUITY_SCORE: 52
ADLS_ACUITY_SCORE: 37
ADLS_ACUITY_SCORE: 27
ADLS_ACUITY_SCORE: 22
ADLS_ACUITY_SCORE: 51
ADLS_ACUITY_SCORE: 33
ADLS_ACUITY_SCORE: 33
ADLS_ACUITY_SCORE: 22
ADLS_ACUITY_SCORE: 37
ADLS_ACUITY_SCORE: 22
ADLS_ACUITY_SCORE: 41
ADLS_ACUITY_SCORE: 31
ADLS_ACUITY_SCORE: 31
ADLS_ACUITY_SCORE: 53
ADLS_ACUITY_SCORE: 22
ADLS_ACUITY_SCORE: 41
ADLS_ACUITY_SCORE: 27
ADLS_ACUITY_SCORE: 35
ADLS_ACUITY_SCORE: 31
ADLS_ACUITY_SCORE: 32
ADLS_ACUITY_SCORE: 37
ADLS_ACUITY_SCORE: 31
ADLS_ACUITY_SCORE: 49
ADLS_ACUITY_SCORE: 22
ADLS_ACUITY_SCORE: 33
ADLS_ACUITY_SCORE: 37
ADLS_ACUITY_SCORE: 31
ADLS_ACUITY_SCORE: 52
ADLS_ACUITY_SCORE: 22
EATING: 0-->INDEPENDENT
ADLS_ACUITY_SCORE: 31
ADLS_ACUITY_SCORE: 32
EATING: 0-->INDEPENDENT
ADLS_ACUITY_SCORE: 35
ADLS_ACUITY_SCORE: 27
ADLS_ACUITY_SCORE: 31
DRESSING/BATHING_DIFFICULTY: NO
ADLS_ACUITY_SCORE: 51
ADLS_ACUITY_SCORE: 31
TOILETING_ISSUES: NO
ADLS_ACUITY_SCORE: 27
ADLS_ACUITY_SCORE: 27
ADLS_ACUITY_SCORE: 52
ADLS_ACUITY_SCORE: 35
ADLS_ACUITY_SCORE: 31
ADLS_ACUITY_SCORE: 22
ADLS_ACUITY_SCORE: 31
ADLS_ACUITY_SCORE: 31
ADLS_ACUITY_SCORE: 37
TOILETING_ISSUES: NO
ADLS_ACUITY_SCORE: 32
ADLS_ACUITY_SCORE: 41
CHANGE_IN_FUNCTIONAL_STATUS_SINCE_ONSET_OF_CURRENT_ILLNESS/INJURY: NO
ADLS_ACUITY_SCORE: 52
ADLS_ACUITY_SCORE: 33
ADLS_ACUITY_SCORE: 22
ADLS_ACUITY_SCORE: 52
ADLS_ACUITY_SCORE: 37
ADLS_ACUITY_SCORE: 31
ADLS_ACUITY_SCORE: 31
ADLS_ACUITY_SCORE: 27
WEAR_GLASSES_OR_BLIND: YES
ADLS_ACUITY_SCORE: 22
ADLS_ACUITY_SCORE: 27
WALKING_OR_CLIMBING_STAIRS_DIFFICULTY: NO
ADLS_ACUITY_SCORE: 22
ADLS_ACUITY_SCORE: 51
ADLS_ACUITY_SCORE: 37
ADLS_ACUITY_SCORE: 31
ADLS_ACUITY_SCORE: 33
ADLS_ACUITY_SCORE: 22
ADLS_ACUITY_SCORE: 27
ADLS_ACUITY_SCORE: 22
ADLS_ACUITY_SCORE: 51
ADLS_ACUITY_SCORE: 37
DIFFICULTY_EATING/SWALLOWING: YES
ADLS_ACUITY_SCORE: 37
ADLS_ACUITY_SCORE: 22
ADLS_ACUITY_SCORE: 22
SWALLOWING: 2-->DIFFICULTY SWALLOWING LIQUIDS/FOODS
EQUIPMENT_CURRENTLY_USED_AT_HOME: SHOWER CHAIR;GRAB BAR, TUB/SHOWER
ADLS_ACUITY_SCORE: 53
ADLS_ACUITY_SCORE: 53
ADLS_ACUITY_SCORE: 31
ADLS_ACUITY_SCORE: 41
ADLS_ACUITY_SCORE: 52
ADLS_ACUITY_SCORE: 22
ADLS_ACUITY_SCORE: 31
ADLS_ACUITY_SCORE: 32
ADLS_ACUITY_SCORE: 37
ADLS_ACUITY_SCORE: 57
ADLS_ACUITY_SCORE: 31
FALL_HISTORY_WITHIN_LAST_SIX_MONTHS: NO
ADLS_ACUITY_SCORE: 53
ADLS_ACUITY_SCORE: 51
ADLS_ACUITY_SCORE: 27
ADLS_ACUITY_SCORE: 37
ADLS_ACUITY_SCORE: 22
ADLS_ACUITY_SCORE: 49
ADLS_ACUITY_SCORE: 57

## 2022-01-01 ASSESSMENT — ENCOUNTER SYMPTOMS
DIARRHEA: 0
COUGH: 1
HEMATURIA: 0
VOMITING: 0
ABDOMINAL PAIN: 0
DYSURIA: 0
SORE THROAT: 1
SHORTNESS OF BREATH: 0

## 2022-01-01 ASSESSMENT — LIFESTYLE VARIABLES: TOBACCO_USE: 1

## 2022-01-01 ASSESSMENT — PAIN SCALES - GENERAL: PAINLEVEL: NO PAIN (0)

## 2022-09-29 PROBLEM — H25.013 CORTICAL AGE-RELATED CATARACT OF BOTH EYES: Status: ACTIVE | Noted: 2022-01-01

## 2022-09-29 PROBLEM — J96.01 ACUTE RESPIRATORY FAILURE WITH HYPOXIA (H): Status: ACTIVE | Noted: 2022-01-01

## 2022-09-29 PROBLEM — H33.321 RETINAL HOLE OF RIGHT EYE: Status: ACTIVE | Noted: 2022-01-01

## 2022-09-30 NOTE — PHARMACY-ADMISSION MEDICATION HISTORY
Pharmacy Note - Admission Medication History    Pertinent Provider Information: Just finished eye drops from surgery last week and has another appointment this week with plan to resume eye drops after procedure.      ______________________________________________________________________    Prior To Admission (PTA) med list completed and updated in EMR.       PTA Med List   Medication Sig Last Dose     ibuprofen (ADVIL/MOTRIN) 200 MG tablet Take 200 mg by mouth every 6 hours as needed Past Month at PRN       Information source(s): Patient and CareEverywhere/SureScripts  Method of interview communication: in-person with surgical mask and faceshield    Summary of Changes to PTA Med List  New: ibuprofen  Discontinued: n/a  Changed: n/a    Patient was asked about OTC/herbal products specifically.  PTA med list reflects this.    Allergies were reviewed, assessed, and updated with the patient.      Patient does not use any multi-dose medications prior to admission.    The information provided in this note is only as accurate as the sources available at the time of the update(s).    Thank you for the opportunity to participate in the care of this patient.    Vaishali Brannon [facility]  9/29/2022 11:19 PM

## 2022-09-30 NOTE — PROGRESS NOTES
.  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     Pneumonia of the right lung  Acute hypoxic respiratory failure  -She had her eye procedure 8/11 and a few days later thought she may have aspirated her 2 part bridge and has had increasing respiratory symptoms since then  -XR chest 9/29: Patchy airspace and interstitial opacities in the right lung suspicious for pneumonia  -CT chest 9/29: Extensive consolidation throughout the right lung consistent with advanced pneumonia, associated occlusion of the right middle and right lower lobe airways with marked thickening of the right upper lobe airways, small right effusion, mediastinal and hilar lymphadenopathy  -Initially given on Rocephin and azithromycin in the ED, started on Zosyn 3.375 g every 8 hours, she does not have risk factors for MRSA  -Guaifenesin 600 mg twice daily  -Continuous pulse oximetry, supplementary oxygen to maintain SPO2 greater than 92%, currently needing 4L by NC  -Nebs prn ordered  -Pulmonology consulted for possible bronchoscopy, awaiting evaluation    Barriers to Discharge:  Clinical improvement, pulmonology evaluation, hypoxia    Anticipated discharge date/Disposition: 2-3 days    Subjective  .  Patient is new to me. Chart reviewed and events noted.  Patient seen and examined.   Reports more SOB and wheezing today. States Nebs helped to loosen up.   No chest pain, fever, N/V, cough.     Objective    Vital signs in last 24 hours  Temp:  [97.6  F (36.4  C)] 97.6  F (36.4  C)  Pulse:  [] 82  Resp:  [13-30] 20  BP: ()/(51-74) 109/51  SpO2:  [78 %-98 %] 95 % [unfilled] O2 Device: Oxymask    Weight:   [unfilled]  Weight change:     Intake/Output last 3 shifts  No intake/output data recorded.  Body mass index is 24.87 kg/m .    Physical Exam    General Appearance:    Alert, cooperative, no distress, appears stated age   Lungs:     Diminished BS, exp wheezing posteriorly+   Cardiovascular:    RRR   Abdomen:     Soft, non-tender, ND   Neurologic:   Awake, alert, grossly normal     Pertinent Labs   Lab Results: personally reviewed.   Recent Labs   Lab 09/30/22  1011 09/29/22 2042    137   CO2 25 21*   BUN 7.9* 12.3     Recent Labs   Lab 09/30/22  1011 09/29/22 2042   WBC 9.5 11.2*   HGB 13.6 14.6   HCT 42.7 43.9    246     No results for input(s): CKTOTAL, TROPONINI in the last 168 hours.    Invalid input(s): TROPONINT, CKMBINDEX  Invalid input(s): POCGLUFGR    Medications  Drug and lactation database from the [place] [employer]:  http://toxnet.nlm.nih.gov/cgi-bin/sis/htmlgen?LACT      Pertinent Radiology   Radiology Results:  Personally reviewed impressions    Reviewed labs in last 24 hours.    Advanced Care Planning:  Discharge Planning discussed with patient and son by bedside  Total time with this patient is 35 min with greater than 50% of time spent in coordination of care.  Care discussed and coordinated with patient and her family    This Note is created using dragon voice recognition software.  Errors in spelling or words which seems out of context are unintentional.  Sounds alike errors may have escaped editing.    Brenna Nelson MD  Hospitalist

## 2022-09-30 NOTE — PROGRESS NOTES
RESPIRATORY CARE NOTE     Patient Name: Nguyen Olivier  Today's Date: 9/30/2022       Pt to receive duo neb. BS are dem with upper airway wheezes. Pt is on 4lpm of oxygen via oxymask, SpO2 is 93%. Post treatment there is increased aeration. Pt also perceives improvement.  RT encouraged deep breathing and coughing techniques .  RT will continue to monitor and assess.

## 2022-09-30 NOTE — ED NOTES
Pt ambulated to restroom and then given water. Sats dropped during ambulation, but regained when resting.

## 2022-09-30 NOTE — ED TRIAGE NOTES
Pt has been having increasing shortness of breath over the last week with infrequent coughing. Initial oxygen saturation after walking to triage room was 78%. After bringing back to a room and laying in bed, saturation is up to 86% on room air. Oxymask was placed on patient and titrated up to 10 LPM and saturation improved to 93%. She has some concern for a possible aspiration of a dental implant a couple weeks ago.      Triage Assessment     Row Name 09/29/22 2031       Triage Assessment (Adult)    Airway WDL WDL       Respiratory WDL    Respiratory WDL rhythm/pattern    Rhythm/Pattern, Respiratory tachypneic;shortness of breath       Skin Circulation/Temperature WDL    Skin Circulation/Temperature WDL WDL       Cardiac WDL    Cardiac WDL WDL       Peripheral/Neurovascular WDL    Peripheral Neurovascular WDL WDL       Cognitive/Neuro/Behavioral WDL    Cognitive/Neuro/Behavioral WDL WDL       Alice Coma Scale    Best Eye Response 4-->(E4) spontaneous    Best Motor Response 6-->(M6) obeys commands    Best Verbal Response 5-->(V5) oriented    Battiest Coma Scale Score 15

## 2022-09-30 NOTE — H&P
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     Pneumonia of the right lung  Acute hypoxic respiratory failure  Former smoker  -She had her eye procedure 8/11 and a few days later thought she may have aspirated her 2 part bridge and has had increasing respiratory symptoms since  -XR chest 9/29: Patchy airspace and interstitial opacities in the right lung suspicious for pneumonia  -CT chest 9/29: Extensive consolidation throughout the right lung consistent with advanced pneumonia, associated occlusion of the right middle and right lower lobe airways with marked thickening of the right upper lobe airways, small right effusion, mediastinal and hilar lymphadenopathy  -Initially given on Rocephin and azithromycin in the ED, started on Zosyn 3.375 g every 6 hours, she does not have risk factors for MRSA  -Guaifenesin 600 mg twice daily  -Continuous pulse oximetry, supplementary oxygen to maintain SPO2 greater than 92%  -Pulmonology consulted for possible bronchoscopy     Diet: Regular Diet Adult  DVT Prophylaxis: Enoxaparin (Lovenox) SQ  Thomson Catheter: Not present  Central Lines: None  Cardiac Monitoring: None  Code Status: Full Code      Clinically Significant Risk Factors Present on Admission                          Disposition Plan      Expected Discharge Date: 10/01/2022                The patient's care was discussed with the Patient.    Rhea Shaffer MD  Hospitalist Service  [facility]  Securely message with the Vocera Web Console (learn more here)  Text page via [employer] Paging/Directory  "        ______________________________________________________________________    Chief Complaint   Shortness of breath    History is obtained from the patient    History of Present Illness   Nguyen Olivier is a 77 year old female who had an eye procedure on 8/11 and a few days later thought she may have aspirated her 2 part dental bridge.  Since that time she has been having increasing shortness of breath which brought her in today.  She does not have any chronic medical conditions.  She is a former smoker but quit many years ago.  She does not regularly see a doctor and has not been hospitalized since her son was born 42 years ago.  She does not take any prescribed medications.  She was found to have consolidative pneumonia on the right with extensive airway infiltration.  She does not have signs of sepsis, her white count is elevated but not above 12.  She is having respiratory failure with saturations in the 70s on room air.  She is requiring 4 L nasal cannula.  Besides the breathing issues she is not having any other complaints.    Review of Systems    The 10 point Review of Systems is negative other than noted in the HPI.    Past Medical History    I have reviewed this patient's medical history and updated it with pertinent information if needed.   Past Medical History:   Diagnosis Date     Right Macular hole        Past Surgical History   I have reviewed this patient's surgical history and updated it with pertinent information if needed.  Past Surgical History:   Procedure Laterality Date     right vitrectomy Right        Social History   I have reviewed this patient's social history and updated it with pertinent information if needed.  Social History     Tobacco Use     Smoking status: Former Smoker     Types: Cigarettes   Substance Use Topics     Drug use: Never       Family History   { TIP  No Family History on file, click here to document (can select \"no known problems\"). Remember to CLOSE FamHx " activity & REFRESH note to display updates.   Or use the list below.                                             :[id number]}  No significant family history    Prior to Admission Medications   Prior to Admission Medications   Prescriptions Last Dose Informant Patient Reported? Taking?   ibuprofen (ADVIL/MOTRIN) 200 MG tablet Past Month at PRN  Yes Yes   Sig: Take 200 mg by mouth every 6 hours as needed      Facility-Administered Medications: None     Allergies   No Known Allergies    Physical Exam   Vital Signs: Temp: 97.6  F (36.4  C) Temp src: Temporal BP: 114/64 Pulse: 83   Resp: 13 SpO2: 94 % O2 Device: Oxymask Oxygen Delivery: 6 LPM  Weight: 136 lbs 0 oz    Constitutional: awake, alert, cooperative, no apparent distress, and appears stated age  Eyes: Lids and lashes normal, pupils equal, round, extra ocular muscles intact, sclera clear, conjunctiva normal  Respiratory: Absent breath sounds in about half of the right lung fields, normal breath sounds on the left  Cardiovascular: Normal apical impulse, regular rate and rhythm, normal S1 and S2, no S3 or S4, and no murmur noted  GI: normal bowel sounds, soft, non-distended, non-tender  Skin: normal skin color, texture, turgor, no rashes and no jaundice  Musculoskeletal: no lower extremity pitting edema present, tone is normal, no weakness noted  Neurologic: Awake, alert, oriented to name, place and time.  Cranial nerves II-XII are grossly intact.  No gross focal abnormalities   Neuropsychiatric: Appropriate mood and affect    Data   Data reviewed today: I reviewed all medications, new labs and imaging results over the last 24 hours. I personally reviewed no images or EKG's today.    Recent Labs   Lab 09/29/22  2042   WBC 11.2*   HGB 14.6   MCV 86      INR 1.05      POTASSIUM 3.8   CHLORIDE 101   CO2 21*   BUN 12.3   CR 0.69   ANIONGAP 15   JO 8.5*   *     Recent Results (from the past 24 hour(s))   XR Chest Port 1 View    Narrative    EXAM: XR  CHEST PORT 1 VIEW  LOCATION: [facility]  DATE/TIME: 9/29/2022 8:49 PM    INDICATION: Shortness of breath. Evaluate for foreign body aspiration.  COMPARISON: None.      Impression    IMPRESSION:     Patchy airspace and interstitial opacities in the right lung, suspicious for pneumonia. Consider follow-up imaging after treatment to document resolution.    Probable small right pleural effusion.    No focal airspace disease in the left lung. The left costophrenic angle is not included in the field-of-view, limiting evaluation. No definite left pleural effusion or pneumothorax.    The cardiomediastinal silhouette is unremarkable. Aortic calcifications.   CT Chest Pulmonary Embolism w Contrast    Narrative    EXAM: CT CHEST PULMONARY EMBOLISM W CONTRAST  LOCATION: [facility]  DATE/TIME: 9/29/2022 10:27 PM    INDICATION: sob hypoxia  COMPARISON: Chest radiograph today.  TECHNIQUE: CT chest pulmonary angiogram during arterial phase injection of IV contrast. Multiplanar reformats and MIP reconstructions were performed. Dose reduction techniques were used.   CONTRAST: 100ml isovue 370    FINDINGS:  ANGIOGRAM CHEST: No pulmonary embolus. Advanced atherosclerosis of the aorta. No aortic dissection. Aneurysmal dilatation of the infrarenal abdominal aorta is not fully imaged. The imaged portion measures up to 3.0 cm.    LUNGS AND PLEURA: Extensive consolidation and interstitial thickening throughout the right lung is most dense in the right lower lobe and central portions of the right upper and right middle lobe. Airways of the right middle and right lower lobes are   occluded. Marked thickening of the right upper lobe airways. Small right pleural effusion. Mild scarring in the left upper lobe. Mild atelectasis in the left lower lobe.    MEDIASTINUM/AXILLAE: Several enlarged mediastinal and bilateral hilar lymph nodes. Small pericardial effusion.    CORONARY ARTERY CALCIFICATION:  Moderate.    UPPER ABDOMEN: Small benign hepatic cysts.    MUSCULOSKELETAL: Degenerative changes of the spine.      Impression    IMPRESSION:  1.  No pulmonary embolus.  2.  Extensive consolidation throughout the right lung consistent with advanced pneumonia. Associated occlusion of right middle and right lower lobe airways. Marked thickening of the right upper lobe airways. Small right pleural effusion. Mediastinal and   hilar lymphadenopathy presumably reactive. Short-term chest CT follow-up recommended to assess for response to treatment and exclude the possibility of an underlying neoplastic process.  3.  Aneurysmal dilatation of the infrarenal abdominal aorta not fully imaged. Imaged portion measures up to 3 cm.   Neck soft tissue XR    Narrative    EXAM: XR NECK SOFT TISSUE  LOCATION: [facility]  DATE/TIME: 9/29/2022 11:36 PM    INDICATION: eval fb  COMPARISON: None.  TECHNIQUE: CR Neck Soft Tissue.      Impression    IMPRESSION: No radiopaque foreign body is identified. The airway is patent.

## 2022-09-30 NOTE — PLAN OF CARE
Goal Outcome Evaluation:    Plan of Care Reviewed With: patient        Problem: Respiratory Compromise (Pneumonia)  Goal: Effective Oxygenation and Ventilation  Outcome: Ongoing, Progressing     Pt. Was on 10L of oxygen on oxymask this morning and now on 4L. Pt. On 4L of oxygen and switches from nasal cannula to oxymask for comfort. Sputum sample sent. Pulmonology consulted. Pt. Stated that when she ambulated to the bathroom, that her breathing did not feel too labored.   Nebulizers scheduled and percussive therapy ordered.   Tiffanie Ibarra RN

## 2022-09-30 NOTE — CONSULTS
PULMONARY MEDICINE CONSULT  9/30/2022    Admit Date: 9/29/2022  CODE: Full Code    Reason for Consult: Shortness of breath, PNA    Assessment/Plan:     77 y F with a 70+ pack year tobacco history, quit 1 year ago, who presents with several weeks of cough, and shortness of breath, since retina surgery, during which she underwent conscious sedation. Has significant RLL infiltrate on CT chest. Mass-like with impressive septal thickening and endobronchial occlusion in some areas scant pleural effusion. No prior imaging. Doesn't seem to doctor frequently.     Concern for aspiration pneumonia as a result of conscious sedation procedure recently. Unclear significance of the dental bridge - aspirated versus swallowed. Her CT, in the context of her smoking history raises some concern given the septal thickening (lymphangitic spread?), lymphadenopathy and endobronchial occlusion.     Recommendations:    Continue zosyn. Send sputum culture.    Continue duoneb    Add metaneb for bronchial hygiene.    Several indications/reasons for bronchoscopy in this case, as outlined above. I don't see any evidence of dental bridge present on her CT scan. I think it would still be worthwhile to do an airway inspection, and BAL for cultures and cytology, and endobronchial interventions if occlusion there. She is on 4-6 L of oxygen so I discussed a risk of worsening respiratory status with she and her son and they expressed understanding. Given the possible interventions needed and O2 needs, would prefer to do this in the OR with intubation/anesthsia.    Will tentatively make her NPO after MN. This is not guaranteed for tomorrow and will depend on OR & staff availability. I will see the patient in the morning and make the call at that time.      We will continue to follow along.      Monica Johnson MD  Pulmonary and Critical Care Medicine  [facility]  Office: [phone]    ------------------------------    CCx: SOB    HPI:   77 y F  with a 70+ pack year tobacco history, quit 1 year ago, who presents with several weeks of cough, and shortness of breath, since retina surgery, during which she underwent conscious sedation. Has significant RLL infiltrate on CT chest. Mass-like with impressive septal thickening and endobronchial occlusion in some areas scant pleural effusion. No prior imaging.    Between now and then, describes not being able to find her partial bridge after eating a meal. I cannot tease out from her if she feels this was swallowed versus aspirated - there was no significant coughing during this meal, I tend to think it would have been more dramatic/obvious if it was aspirated.    VBG appropriate  WBC 11  Procal 0.11      ROS:  A 12-system review was obtained and was negative with the exception of the symptoms endorsed in the history of present illness.    Current Medications:  Reviewed in detail    Past Medical History:  Past Medical History:   Diagnosis Date     Right Macular hole      Past Surgical History  Past Surgical History:   Procedure Laterality Date     right vitrectomy Right      Allergies:  No Known Allergies    Family Hx:  History reviewed. No pertinent family history.    Social Hx:  Social History     Socioeconomic History     Marital status:      Spouse name: Not on file     Number of children: Not on file     Years of education: Not on file     Highest education level: Not on file   Occupational History     Not on file   Tobacco Use     Smoking status: Former Smoker     Types: Cigarettes     Smokeless tobacco: Not on file   Substance and Sexual Activity     Alcohol use: Not on file     Drug use: Never     Sexual activity: Not on file   Other Topics Concern     Not on file   Social History Narrative     Not on file     Social Determinants of Health     Financial Resource Strain: Not on file   Food Insecurity: Not on file   Transportation Needs: Not on file   Physical Activity: Not on file   Stress: Not  "on file   Social Connections: Not on file   Intimate Partner Violence: Not on file   Housing Stability: Not on file     Exam/Data:     Vitals  /51   Pulse 82   Temp 97.6  F (36.4  C) (Temporal)   Resp 20   Ht 1.575 m (5' 2\")   Wt 61.7 kg (136 lb)   SpO2 93%   BMI 24.87 kg/m    BP - Mean:  [72-99] 83  No intake/output data recorded.  Weight change:     EXAM:  /51   Pulse 82   Temp 97.6  F (36.4  C) (Temporal)   Resp 20   Ht 1.575 m (5' 2\")   Wt 61.7 kg (136 lb)   SpO2 93%   BMI 24.87 kg/m      Intake/Output last 3 shifts:  No intake/output data recorded.  Intake/Output this shift:  No intake/output data recorded.    Physical Exam  Gen: Alert, oriented, no distress  HEENT: NT, no SAMM  CV: RRR, no m/g/r  Resp: Diminished, crackles ar R lung base. Scant expiratory wheeze  Abd: soft, nontender, BS+  Skin: no rashes or lesions  Ext: no edema  Neuro: PERRL, nonfocal exam    DATA  All laboratory and radiology has been personally reviewed by myself today.    IMAGING:   Personally reviewed and interpreted    9/29/22 CT Chest:  ANGIOGRAM CHEST: No pulmonary embolus. Advanced atherosclerosis of the aorta. No aortic dissection. Aneurysmal dilatation of the infrarenal abdominal aorta is not fully imaged. The imaged portion measures up to 3.0 cm.     LUNGS AND PLEURA: Extensive consolidation and interstitial thickening throughout the right lung is most dense in the right lower lobe and central portions of the right upper and right middle lobe. Airways of the right middle and right lower lobes are   occluded. Marked thickening of the right upper lobe airways. Small right pleural effusion. Mild scarring in the left upper lobe. Mild atelectasis in the left lower lobe.     MEDIASTINUM/AXILLAE: Several enlarged mediastinal and bilateral hilar lymph nodes. Small pericardial effusion.     CORONARY ARTERY CALCIFICATION: Moderate.     UPPER ABDOMEN: Small benign hepatic cysts.     MUSCULOSKELETAL: Degenerative " changes of the spine.                                                    IMPRESSION:  1.  No pulmonary embolus.  2.  Extensive consolidation throughout the right lung consistent with advanced pneumonia. Associated occlusion of right middle and right lower lobe airways. Marked thickening of the right upper lobe airways. Small right pleural effusion. Mediastinal and hilar lymphadenopathy presumably reactive. Short-term chest CT follow-up recommended to assess for response to treatment and exclude the possibility of an underlying neoplastic process.  3.  Aneurysmal dilatation of the infrarenal abdominal aorta not fully imaged. Imaged portion measures up to 3 cm.

## 2022-09-30 NOTE — CONSULTS
"Care Management Initial Consult    General Information  Assessment completed with: Nguyen Serrano  Type of CM/SW Visit: Initial Assessment    Primary Care Provider verified and updated as needed: Yes (\"I go to an [facility] doctor\")   Readmission within the last 30 days: no previous admission in last 30 days      Reason for Consult: discharge planning  Advance Care Planning: Advance Care Planning Reviewed: no concerns identified          Communication Assessment  Patient's communication style: spoken language (English or Bilingual)                             Living Environment:   People in home: alone     Current living Arrangements: apartment (\"[facility]\")      Able to return to prior arrangements: yes       Family/Social Support:  Care provided by: self  Provides care for: no one  Marital Status:   Children          Description of Support System: Involved, Supportive    Support Assessment: Adequate family and caregiver support, Adequate social supports, Patient communicates needs well met    Current Resources:   Patient receiving home care services: No     Community Resources: None  Equipment currently used at home: none  Supplies currently used at home: Other (\"glasses, dentures\")    Employment/Financial:  Employment Status: retired     Employment/ Comments: \"no  benefits\"  Financial Concerns:     Referral to Financial Worker: No       Lifestyle & Psychosocial Needs:  Social Determinants of Health     Tobacco Use: Medium Risk     Smoking Tobacco Use: Former Smoker     Smokeless Tobacco Use: Unknown   Alcohol Use: Not on file   Financial Resource Strain: Not on file   Food Insecurity: Not on file   Transportation Needs: Not on file   Physical Activity: Not on file   Stress: Not on file   Social Connections: Not on file   Intimate Partner Violence: Not on file   Depression: Not on file   Housing Stability: Not on file       Functional Status:  Prior to admission " "patient needed assistance:   Dependent ADLs:: Independent, Ambulation-no assistive device  Dependent IADLs:: Independent (\"I can drive. I haven't been driving since my retinal detachment and repair\".)  Assesssment of Functional Status: At functional baseline    Mental Health Status:          Chemical Dependency Status:                Values/Beliefs:  Spiritual, Cultural Beliefs, Oriental orthodox Practices, Values that affect care:                 Additional Information:  Nguyen lives in a [address] Independent Living Apartment alone. It is called [facility]. She has elevator access.    She is independent with ADLs at baseline. \"I can drive, but I haven't been driving since my retinal detachment and repair\".    Likely no discharge needs at this time.    Family to transport at discharge.    Shanice Vasquez RN      "

## 2022-09-30 NOTE — ED PROVIDER NOTES
EMERGENCY DEPARTMENT ENCOUNTER      NAME: Nguyen Olivier  AGE: 77 year old female  YOB: 1945  MRN: [id number]  EVALUATION DATE & TIME: 9/29/2022  8:27 PM    PCP: No primary care provider on file.    ED PROVIDER: Teresa Bowers MD    Chief Complaint   Patient presents with     Shortness of Breath         FINAL IMPRESSION:  1. Acute respiratory failure with hypoxia (H)    2. Pneumonia of right lower lobe due to infectious organism          ED COURSE & MEDICAL DECISION MAKING:    Pertinent Labs & Imaging studies reviewed. (See chart for details)  77 year old female without significant past medical history who presents to the Emergency Department for evaluation of several weeks of progressive dyspnea since she had a cataract surgery.  Patient describes not being able to find her partial bridge/dental implant after the procedure and thinks she potentially aspirated it.  She does not however have any signs of stridor or upper airway foreign body.  She is hypoxic on arrival.  Differential includes aspiration, pneumonia, COVID-19, viral syndrome, pulmonary embolism, symptomatic anemia, arrhythmia, ACS.    Patient placed on monitor, supplemental oxygen for sats in the 70s, IV established and blood obtained.  A twelve-lead EKG was obtained showing sinus rhythm without ischemic changes.  Patient is requiring 4 L nasal cannula oxygen.  CBC, BMP, BNP, troponin, procalcitonin, coags, VBG, COVID/influenza testing were obtained and unremarkable.  Initially portable chest x-ray was obtained to evaluate for any signs of obvious foreign body aspiration.  There is no evidence of foreign body but there is a dense right lower lobe pneumonia.  Covered with Rocephin and is Ethril for community-acquired pneumonia.  CT PE study was obtained, negative for PE but does again show this dense pneumonia.  Patient is still insistent that she aspirated her bridge.  I did obtain a soft tissue neck x-ray which is negative.  Will  be admitted for further management of her pneumonia and hypoxic respiratory failure.    8:31 PM I introduced myself to the patient, obtained patient history, performed a physical exam, and discussed plan for ED workup including potential diagnostic laboratory/imaging studies and interventions.    11:08 PM I rechecked the patient and updated them on laboratory and imaging results.    ED Course as of 09/30/22 0011   u Sep 29, 2022   2050 SpO2(!): 78 %   2221 Procalcitonin(!): 0.11         At the conclusion of the encounter I discussed the results of all of the tests and the disposition. The questions were answered. The patient or family acknowledged understanding and was agreeable with the care plan.      CRITICAL CARE:  Critical Care  Performed by: Teresa Bowers MD  Authorized by: Teresa Bowers MD     Total critical care time: 49 minutes  Criticalcare time was exclusive of separately billable procedures and treating other patients.  Critical care was necessary to treat or prevent imminent or life-threatening deterioration of the following conditions: Cardiopulmonary decompensation, death, disability  Critical care was time spent personally by me on the following activities: development of treatment plan with patient or surrogate, discussions with consultants, examination of patient, evaluation of patient's response totreatment, obtaining history from patient or surrogate, ordering and performing treatments and interventions, ordering and review of laboratory studies, ordering and review of radiographic studies and re-evaluation ofpatient's condition, this excludes any separately billable procedures.      MEDICATIONS GIVEN IN THE EMERGENCY:  Medications   azithromycin (ZITHROMAX) 500 mg in sodium chloride 0.9 % 250 mL intermittent infusion (500 mg Intravenous New Bag 9/29/22 6358)   guaiFENesin (MUCINEX) 12 hr tablet 600 mg (has no administration in time range)   lidocaine 1 % 0.1-1 mL (has no administration in  time range)   lidocaine (LMX4) cream (has no administration in time range)   sodium chloride (PF) 0.9% PF flush 3 mL (has no administration in time range)   sodium chloride (PF) 0.9% PF flush 3 mL (has no administration in time range)   melatonin tablet 1 mg (has no administration in time range)   enoxaparin ANTICOAGULANT (LOVENOX) injection 40 mg (has no administration in time range)   acetaminophen (TYLENOL) tablet 650 mg (has no administration in time range)     Or   acetaminophen (TYLENOL) Suppository 650 mg (has no administration in time range)   senna-docusate (SENOKOT-S/PERICOLACE) 8.6-50 MG per tablet 1 tablet (has no administration in time range)     Or   senna-docusate (SENOKOT-S/PERICOLACE) 8.6-50 MG per tablet 2 tablet (has no administration in time range)   cefTRIAXone (ROCEPHIN) 2 g vial to attach to  ml bag for ADULTS or NS 50 ml bag for PEDS (0 g Intravenous Stopped 9/29/22 2321)   iopamidol (ISOVUE-370) solution 100 mL (100 mLs Intravenous Given 9/29/22 2228)       NEW PRESCRIPTIONS STARTED AT TODAY'S ER VISIT  New Prescriptions    No medications on file          =================================================================    HPI    Patient information was obtained from: Patient     Use of Intrepreter: N/A        Nguyen Olivier is a 77 year old female with no contributory medical history who presents to the ED via walk in for the evaluation of shortness of breath.    The patient had surgery on her eye a couple of weeks ago.  She had anesthesia , when she woke up, she lost a partial bridge of her teeth. She thinks she aspirated or swallowed it, but is unsure. Since then she's been having progressively worsening shortness of breath. She does have an occasional phlegmy cough. She has been drinking and eating okay. Her stats were in the 70's in triage.     Patient denies any fever, chills or chest pain. She does not have any history of blood clots, bleeding disorder, leg pain or calf  "swelling.       REVIEW OF SYSTEMS  Constitutional:  Denies fever, chills, weight loss or weakness  Eyes:  No pain, discharge, redness  HENT:  Denies sore throat, ear pain, congestion  Respiratory: Positive for shortness of breath and cough. No wheeze.   Cardiovascular:  No CP, palpitations  GI:  Denies abdominal pain, nausea, vomiting, diarrhea  Musculoskeletal:  Denies any new muscle/joint pain, swelling or loss of function.  Neurologic:  Denies headache, focal weakness or sensory changes    All other systems negative unless noted in HPI.      PAST MEDICAL HISTORY:  History reviewed. No pertinent past medical history.    PAST SURGICAL HISTORY:  History reviewed. No pertinent surgical history.    CURRENT MEDICATIONS:    Prior to Admission Medications   Prescriptions Last Dose Informant Patient Reported? Taking?   ibuprofen (ADVIL/MOTRIN) 200 MG tablet Past Month at PRN  Yes Yes   Sig: Take 200 mg by mouth every 6 hours as needed      Facility-Administered Medications: None       ALLERGIES:  No Known Allergies    FAMILY HISTORY:  History reviewed. No pertinent family history.    SOCIAL HISTORY:        VITALS:  Patient Vitals for the past 24 hrs:   BP Temp Temp src Pulse Resp SpO2 Height Weight   09/29/22 2300 104/56 -- -- 87 19 93 % -- --   09/29/22 2245 111/58 -- -- 85 26 95 % -- --   09/29/22 2230 111/56 -- -- 85 22 94 % -- --   09/29/22 2145 102/58 -- -- 87 30 94 % -- --   09/29/22 2130 99/57 -- -- 85 21 95 % -- --   09/29/22 2115 106/54 -- -- 86 17 95 % -- --   09/29/22 2100 115/53 -- -- 91 22 95 % -- --   09/29/22 2050 -- -- -- 95 29 96 % -- --   09/29/22 2045 135/63 -- -- 99 21 95 % -- --   09/29/22 2035 -- -- -- -- -- 93 % -- --   09/29/22 2030 (!) 141/74 -- -- -- -- -- -- --   09/29/22 2026 114/59 97.6  F (36.4  C) Temporal 119 30 (!) 78 % 1.575 m (5' 2\") 61.7 kg (136 lb)       PHYSICAL EXAM    General Appearance:  Thin, mild respiratory distress tympanic in the 30's, speaks in full sentences.    Head:  " Normocephalic  Eyes: conjunctiva/corneas clear  ENT:  membranes are moist without pallor No stridor.  Posterior pharynx unremarkable.  No visualized foreign body.  Speaks in full sentences without difficulty  Neck:  Supple  Chest:  No tenderness or deformity, no crepitus.   Cardio:  Regular rate and rhythm, no murmur/gallop/rub, 2+ pulses symmetric in all extremities  Pulm:   breath sounds decreased in bilateral bases, mild respiratory distress with tachypnea  Back:  No CVA tenderness, normal ROM  Abdomen:  Soft, non-tender, non distended,no rebound or guarding.  Extremities:  Extremities normal, atraumatic, no cyanosis, full ROM and motor tone intact, bilateral pulses intact upper and lower. No peripheral edema.   Skin:  Skin warm, dry, no rashes  Neuro:  Alert and oriented ×3, moving all extremities, no gross sensory defects     RADIOLOGY/LABS:  Reviewed all pertinent imaging. Please see official radiology report. All pertinent labs reviewed and interpreted.    Results for orders placed or performed during the hospital encounter of 09/29/22   CT Chest Pulmonary Embolism w Contrast    Impression    IMPRESSION:  1.  No pulmonary embolus.  2.  Extensive consolidation throughout the right lung consistent with advanced pneumonia. Associated occlusion of right middle and right lower lobe airways. Marked thickening of the right upper lobe airways. Small right pleural effusion. Mediastinal and   hilar lymphadenopathy presumably reactive. Short-term chest CT follow-up recommended to assess for response to treatment and exclude the possibility of an underlying neoplastic process.  3.  Aneurysmal dilatation of the infrarenal abdominal aorta not fully imaged. Imaged portion measures up to 3 cm.   XR Chest Port 1 View    Impression    IMPRESSION:     Patchy airspace and interstitial opacities in the right lung, suspicious for pneumonia. Consider follow-up imaging after treatment to document resolution.    Probable small right  pleural effusion.    No focal airspace disease in the left lung. The left costophrenic angle is not included in the field-of-view, limiting evaluation. No definite left pleural effusion or pneumothorax.    The cardiomediastinal silhouette is unremarkable. Aortic calcifications.   Neck soft tissue XR    Impression    IMPRESSION: No radiopaque foreign body is identified. The airway is patent.   Basic metabolic panel   Result Value Ref Range    Sodium 137 136 - 145 mmol/L    Potassium 3.8 3.4 - 5.3 mmol/L    Chloride 101 98 - 107 mmol/L    Carbon Dioxide (CO2) 21 (L) 22 - 29 mmol/L    Anion Gap 15 7 - 15 mmol/L    Urea Nitrogen 12.3 8.0 - 23.0 mg/dL    Creatinine 0.69 0.51 - 0.95 mg/dL    Calcium 8.5 (L) 8.8 - 10.2 mg/dL    Glucose 132 (H) 70 - 99 mg/dL    GFR Estimate 90 >60 mL/min/1.73m2   Result Value Ref Range    Troponin T, High Sensitivity 13 <=14 ng/L   Result Value Ref Range    INR 1.05 0.85 - 1.15   Result Value Ref Range    aPTT 24 22 - 38 Seconds   Nt probnp inpatient   Result Value Ref Range    N terminal Pro BNP Inpatient 440 0 - 900 pg/mL   Blood gas venous   Result Value Ref Range    pH Venous 7.36 7.35 - 7.45    pCO2 Venous 46 35 - 50 mm Hg    pO2 Venous 31 25 - 47 mm Hg    Bicarbonate Venous 24 24 - 30 mmol/L    Base Excess/Deficit (+/-) 0.8   mmol/L    Oxyhemoglobin Venous 56.5 (L) 70.0 - 75.0 %    O2 Sat, Venous 57.3 (L) 70.0 - 75.0 %   Symptomatic; Unknown Influenza A/B & SARS-CoV2 (COVID-19) Virus PCR Multiplex Nasopharyngeal    Specimen: Nasopharyngeal; Swab   Result Value Ref Range    Influenza A PCR Negative Negative    Influenza B PCR Negative Negative    RSV PCR Negative Negative    SARS CoV2 PCR Negative Negative   CBC with platelets and differential   Result Value Ref Range    WBC Count 11.2 (H) 4.0 - 11.0 10e3/uL    RBC Count 5.09 3.80 - 5.20 10e6/uL    Hemoglobin 14.6 11.7 - 15.7 g/dL    Hematocrit 43.9 35.0 - 47.0 %    MCV 86 78 - 100 fL    MCH 28.7 26.5 - 33.0 pg    MCHC 33.3 31.5 - 36.5  g/dL    RDW 14.0 10.0 - 15.0 %    Platelet Count 246 150 - 450 10e3/uL    % Neutrophils 64 %    % Lymphocytes 18 %    % Monocytes 9 %    % Eosinophils 7 %    % Basophils 1 %    % Immature Granulocytes 1 %    NRBCs per 100 WBC 0 <1 /100    Absolute Neutrophils 7.4 1.6 - 8.3 10e3/uL    Absolute Lymphocytes 2.0 0.8 - 5.3 10e3/uL    Absolute Monocytes 1.0 0.0 - 1.3 10e3/uL    Absolute Eosinophils 0.7 0.0 - 0.7 10e3/uL    Absolute Basophils 0.1 0.0 - 0.2 10e3/uL    Absolute Immature Granulocytes 0.1 <=0.4 10e3/uL    Absolute NRBCs 0.0 10e3/uL   Extra Red Top Tube   Result Value Ref Range    Hold Specimen JIC    Extra Green Top (Lithium Heparin) Tube   Result Value Ref Range    Hold Specimen JIC    Result Value Ref Range    Procalcitonin 0.11 (H) <0.05 ng/mL       EKG:  Performed at: 9/29/2022 20:40    Impression: Sinus rhythm with premature atrial complexes, Left axis deviation, abnormal ECG    Rate: 98 BPM  Rhythm: Sinus rhythm with premature atrial complexes  Axis: -38  IL Interval: 122 ms  QRS Interval: 70 ms  QTc Interval: 462 ms  ST Changes: N/A  Comparison: None  I have independently reviewed and interpreted the EKG(s) documented above.      The creation of this record is based on the scribe s observations of the work being performed by Teresa Bowers MD and the provider s statements to them. It was created on his behalf by Jermaine Hoffman, a trained medical scribe. This document has been checked and approved by the attending provider.    Teresa Bowers MD  Emergency Medicine  [facility] EMERGENCY DEPARTMENT  [facility] [facility] [id number] [phone]  Dept: [phone]     Teresa Bowers MD  09/30/22 0012

## 2022-10-01 NOTE — PROGRESS NOTES
Pulmonary Progress Note:    Went back this afternoon to discuss bronchoscopy with Nguyen and her sons were both present as well. Reviewed that what I saw is very likely cancer in the lungs, and results will likely take until sometime next week to confirm. She informed me that she puts high value of quality of life, and would not want any treatment for this. In light of this, we re-discussed her code status, she would like to think about this more and this topic should be re-visited with her.       Monica Johnson MD  Pulmonary and Critical Care Medicine  [facility]  Office: [phone]

## 2022-10-01 NOTE — PRE-PROCEDURE
GENERAL PRE-PROCEDURE:   Procedure:  Bronchoscopy  Date/Time:  10/1/2022 10:00 AM    Verbal consent obtained?: Yes    Written consent obtained?: Yes    Risks and benefits: Risks, benefits and alternatives were discussed    Consent given by:  Patient  Patient states understanding of procedure being performed: Yes    Patient's understanding of procedure matches consent: Yes    Procedure consent matches procedure scheduled: Yes    Expected level of sedation:  Deep  Appropriately NPO:  Yes  ASA Class:  3  Mallampati  :  Grade 2- soft palate, base of uvula, tonsillar pillars, and portion of posterior pharyngeal wall visible  Lungs:  Crackles right base  Heart:  Normal heart sounds and rate  History & Physical reviewed:  History and physical reviewed and no updates needed  Statement of review:  I have reviewed the lab findings, diagnostic data, medications, and the plan for sedation      Monica Johnson MD  Pulmonary and Critical Care Medicine  [facility]  Office: [phone]

## 2022-10-01 NOTE — PROGRESS NOTES
Pt is on 4 lpm 02 sating sating 92% RR 24 HR 91. Pt is SOB, breath sounds both in and exp wheezes. Pt started with Volera meta neb inline with neb. Pt started coughing up thick white secretions post tx. Pt states that she does fell some improvement post tx. Rt continue to monitor.

## 2022-10-01 NOTE — PROGRESS NOTES
PULMONARY MEDICINE PROGRESS NOTE  9/30/2022    Admit Date: 9/29/2022  CODE: Full Code    Reason for Consult: Shortness of breath, PNA    Assessment/Plan:     77 y F with a 70+ pack year tobacco history, quit 1 year ago, who presents with several weeks of cough, and shortness of breath, since retina surgery, during which she underwent conscious sedation. Has significant RLL infiltrate on CT chest. Mass-like with impressive septal thickening and endobronchial occlusion in some areas scant pleural effusion. No prior imaging. Doesn't seem to doctor frequently.     Concern for aspiration pneumonia as a result of conscious sedation procedure recently. Unclear significance of the dental bridge - she feels she aspirated this though no evidence on CT scan. Her CT, in the context of her smoking history raises some concern given the septal thickening (lymphangitic spread?), lymphadenopathy and endobronchial occlusion.     Recommendations:    Continue zosyn.     Continue duoneb    Continue metaneb for bronchial hygiene.    Several indications/reasons for bronchoscopy in this case, as outlined above. I don't see any evidence of dental bridge present on her CT scan. I think it would still be worthwhile to do an airway inspection, and BAL for cultures and cytology, and endobronchial interventions if occlusion there. She is on 4-6 L of oxygen so I discussed a risk of worsening respiratory status with she and her son and they expressed understanding. Given the possible interventions needed, unknown baseline lung function, and O2 needs, would prefer to do this in the OR with general.    Has been NPO after MN, discussed with patient who consents, OR charge, and RT staff and sounds like we can proceed with this this morning.       Monica Johnson MD  Pulmonary and Critical Care Medicine  [facility]  Office: [phone]    ------------------------------    CCx: SOB    Interval HPI:   On 5 L facemask  NPO since last  "night  Discussed bronchoscopy again with her, and her son Chuck at her request. Risks and benefits reviewed.    ----------------------  77 y F with a 70+ pack year tobacco history, quit 1 year ago, who presents with several weeks of cough, and shortness of breath, since retina surgery, during which she underwent conscious sedation. Has significant RLL infiltrate on CT chest. Mass-like with impressive septal thickening and endobronchial occlusion in some areas scant pleural effusion. No prior imaging.    Between now and then, describes not being able to find her partial bridge after eating a meal. I cannot tease out from her if she feels this was swallowed versus aspirated - there was no significant coughing during this meal, I tend to think it would have been more dramatic/obvious if it was aspirated.    VBG appropriate  WBC 11  Procal 0.11      ROS:  A 12-system review was obtained and was negative with the exception of the symptoms endorsed in the history of present illness.    Current Medications:  Reviewed in detail    Exam/Data:     Vitals  /53 (BP Location: Left arm)   Pulse 73   Temp 97.7  F (36.5  C) (Oral)   Resp 19   Ht 1.575 m (5' 2\")   Wt 63 kg (139 lb)   SpO2 92%   BMI 25.42 kg/m       No intake/output data recorded.  Weight change: 1.361 kg (3 lb)    EXAM:  /53 (BP Location: Left arm)   Pulse 73   Temp 97.7  F (36.5  C) (Oral)   Resp 19   Ht 1.575 m (5' 2\")   Wt 63 kg (139 lb)   SpO2 92%   BMI 25.42 kg/m      Intake/Output last 3 shifts:  No intake/output data recorded.  Intake/Output this shift:  No intake/output data recorded.    Physical Exam  Gen: Alert, oriented, no distress  HEENT: NT, no SAMM  CV: RRR, no m/g/r  Resp: Diminished, crackles ar R lung base. No wheezing  Abd: soft, nontender, BS+  Skin: no rashes or lesions  Ext: no edema  Neuro: PERRL, nonfocal exam    DATA  All laboratory and radiology has been personally reviewed by myself today.    IMAGING: "   Personally reviewed and interpreted    9/29/22 CT Chest:  ANGIOGRAM CHEST: No pulmonary embolus. Advanced atherosclerosis of the aorta. No aortic dissection. Aneurysmal dilatation of the infrarenal abdominal aorta is not fully imaged. The imaged portion measures up to 3.0 cm.     LUNGS AND PLEURA: Extensive consolidation and interstitial thickening throughout the right lung is most dense in the right lower lobe and central portions of the right upper and right middle lobe. Airways of the right middle and right lower lobes are   occluded. Marked thickening of the right upper lobe airways. Small right pleural effusion. Mild scarring in the left upper lobe. Mild atelectasis in the left lower lobe.     MEDIASTINUM/AXILLAE: Several enlarged mediastinal and bilateral hilar lymph nodes. Small pericardial effusion.     CORONARY ARTERY CALCIFICATION: Moderate.     UPPER ABDOMEN: Small benign hepatic cysts.     MUSCULOSKELETAL: Degenerative changes of the spine.                                                    IMPRESSION:  1.  No pulmonary embolus.  2.  Extensive consolidation throughout the right lung consistent with advanced pneumonia. Associated occlusion of right middle and right lower lobe airways. Marked thickening of the right upper lobe airways. Small right pleural effusion. Mediastinal and hilar lymphadenopathy presumably reactive. Short-term chest CT follow-up recommended to assess for response to treatment and exclude the possibility of an underlying neoplastic process.  3.  Aneurysmal dilatation of the infrarenal abdominal aorta not fully imaged. Imaged portion measures up to 3 cm.

## 2022-10-01 NOTE — PROGRESS NOTES
Respiratory Therapy Procedure Note  Nguyen Olivier is a 77 year old female     Summary of Care during Procedure:   Assisted  during Bronchoscopy on 10/1/2022 in the OR  Lido-epi given x6cc  Bronchoscopy procedure was done successfully without any complication.  BAL RLL and cytology brush of RUL and RLL were collected. Airway managed by BRAXTON Jefferson, RT 10/1/2022 12:28 PM

## 2022-10-01 NOTE — PROCEDURES
Procedure:  Bronchoscopy  Indication:  Right lower lobe infiltrate, question of aspirated denture, question of endobronchial tumor  Providers: Monica Johnson MD  Medications: General anesthesia, see MAR  6 ml 1% lidocaine with epinephrine in 2 ml aliquots    Time Out:  Performed    The patient's medical record has been reviewed.  The indication for the procedure was reviewed. The necessary history and physical examination was performed.  The risks, benefits and alternatives of the procedure were discussed with the patient  in detail and he/she had the opportunity to ask questions.  All questions were answered and verbal and written informed consent was obtained.  The proposed procedure and the patient's identification were verified prior to the procedure by the physician and the nurse and respiratory therapist.      The patient was assessed for the adequacy for the procedure and to receive medications.     After clinical evaluation and reviewing the indication, risks, alternatives and benefits of the procedure the patient was deemed to be in satisfactory condition to undergo the procedure.      Immediately before administration of medications the patient was re-assessed for adequacy to receive sedatives including the heart rate, respiratory rate, mental status, oxygen saturation, blood pressure and adequacy of pulmonary ventilation. These same parameters were continuously monitored throughout the procedure by anesthesia colleagues.    Maneuvers / Procedure:   The flexible bronchoscope (Olympus) was introduced through the endotracheal tube.     Normal appearing elda   Normal left sided airway inspection. Normal mucosa, no endobronchial lesions noted. No significant secretions.   Thick purulent secretions pooling in R bronchus intermedius. Cleared with suctioning.  Endobronchial masses present at the take offs of both right upper and right lower lobes partially occlusive.   (RUL - photo 1, RLL - photo 2). RLL mass  oozing prior to any interventions. Applied lidocaine with epinephrine x 2 ml with good hemostasis. Unable to pass bronchoscope any more distal to either of these obstructive masses.     BAL performed at RLL with total 75 ml sterile saline instilled, and 18 ml return.  Brushings performed in RUL, with bleeding afterwards that responded to lidocaine with epinephrine x 2 ml with good hemostasis.  Brushings performed in RLL, with bleeding afterwards that responded to lidocaine with epinephrine x 2 ml with good hemostasis.    Given degree of hemorrhage with brushings and BAL, deferred further endobronchial biopsy.     No dentures were identified in the airways. Given degree of obstruction on right sided proximal airways, doubt if aspirated, these were able to pass these masses to go more distally into airways.       Complications: none    Estimated Blood Loss: none    The patient tolerated the procedure well without undue discomfort, hypotension or arrhythmia, or hypoxia.      Monica Johnson MD  Pulmonary and Critical Care Medicine  [facility]

## 2022-10-01 NOTE — ANESTHESIA POSTPROCEDURE EVALUATION
Patient: Nguyen Olivier    Procedure: Procedure(s):  BRONCHOSCOPY WITH BAL & BRUSHINGS       Anesthesia Type:  General    Note:  Disposition: Inpatient   Postop Pain Control: Uneventful            Sign Out: Well controlled pain   PONV: No   Neuro/Psych: Uneventful            Sign Out: Acceptable/Baseline neuro status   Airway/Respiratory: Uneventful            Sign Out: Acceptable/Baseline resp. status   CV/Hemodynamics: Uneventful            Sign Out: Acceptable CV status   Other NRE: NONE   DID A NON-ROUTINE EVENT OCCUR? No           Last vitals:  Vitals Value Taken Time   /62 10/01/22 1230   Temp 36.8  C (98.2  F) 10/01/22 1149   Pulse 91 10/01/22 1236   Resp 15 10/01/22 1236   SpO2 90 % 10/01/22 1236   Vitals shown include unvalidated device data.    Electronically Signed By: Francois Jean-Baptiste MD  October 1, 2022  3:13 PM

## 2022-10-01 NOTE — PROGRESS NOTES
.  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     Pneumonia of the right lung  Acute hypoxic respiratory failure  -She had her eye procedure 8/11 and a few days later thought she may have aspirated her dental bridge and has had increasing respiratory symptoms since then  -XR chest 9/29: Patchy airspace and interstitial opacities in the right lung suspicious for pneumonia  -CT chest 9/29: Extensive consolidation throughout the right lung consistent with advanced pneumonia, associated occlusion of the right middle and right lower lobe airways with marked thickening of the right upper lobe airways, small right effusion, mediastinal and hilar lymphadenopathy  -Initially given on Rocephin and azithromycin in the ED, continue Zosyn 3.375 g every 8 hours for now.   -Sputum cx ordered pending sample collection  -Guaifenesin 600 mg twice daily, nebs prn, mtaneb  -Continuous pulse oximetry, supplementary oxygen to maintain SPO2 greater than 92%, currently needing 3-4L by NC  -Pulmonology following, planned for bronchoscopy today.     Barriers to Discharge:  Hypoxia, pending procedure    Anticipated discharge date/Disposition: 2-3 days    Subjective  Chart reviewed and events noted.  Patient seen and examined.   States overall much better with her breathing. Denies any chest pain. More sputum now. No fever, abd pain, N/V.     Objective    Vital signs in last 24 hours  Temp:  [97.6  F (36.4  C)-97.7  F (36.5  C)] 97.7  F (36.5  C)  Pulse:  [73-91] 73  Resp:  [19-24] 19  BP: (105-110)/(53-94) 105/53  SpO2:  [92 %-97 %] 92 % [unfilled] O2 Device: Oxymask    Weight:   [unfilled] Weight change: 1.361 kg (3  lb)    Intake/Output last 3 shifts  No intake/output data recorded.  Body mass index is 25.42 kg/m .    Physical Exam    General Appearance:    Alert, cooperative, no distress, appears stated age   Lungs:     Diminished BS, exp wheezing posteriorly+   Cardiovascular:    RRR   Abdomen:     Soft, non-tender, ND   Neurologic:   Awake, alert, grossly normal     Pertinent Labs   Lab Results: personally reviewed.   Recent Labs   Lab 09/30/22  1011 09/29/22 2042    137   CO2 25 21*   BUN 7.9* 12.3     Recent Labs   Lab 09/30/22  1011 09/29/22 2042   WBC 9.5 11.2*   HGB 13.6 14.6   HCT 42.7 43.9    246     No results for input(s): CKTOTAL, TROPONINI in the last 168 hours.    Invalid input(s): TROPONINT, CKMBINDEX  Invalid input(s): POCGLUFGR    Medications  Drug and lactation database from the [place] [employer]:  http://toxnet.nlm.nih.gov/cgi-bin/sis/htmlgen?LACT      Pertinent Radiology   Radiology Results:  Personally reviewed impressions    Reviewed labs in last 24 hours.    Advanced Care Planning:  Discharge Planning discussed with patien  Total time with this patient is 25 min with greater than 50% of time spent in coordination of care.  Care discussed and coordinated with patient     This Note is created using dragon voice recognition software.  Errors in spelling or words which seems out of context are unintentional.  Sounds alike errors may have escaped editing.    Brenna Nelson MD  Hospitalist

## 2022-10-01 NOTE — PLAN OF CARE
Problem: Plan of Care - These are the overarching goals to be used throughout the patient stay.    Goal: Absence of Hospital-Acquired Illness or Injury  Intervention: Identify and Manage Fall Risk  Recent Flowsheet Documentation  Taken 10/1/2022 0906 by Temo Lozoya RN  Safety Promotion/Fall Prevention:   activity supervised   bed alarm on   nonskid shoes/slippers when out of bed  Intervention: Prevent Skin Injury  Recent Flowsheet Documentation  Taken 10/1/2022 0906 by Temo Lozoya RN  Body Position: position changed independently  Intervention: Prevent and Manage VTE (Venous Thromboembolism) Risk  Recent Flowsheet Documentation  Taken 10/1/2022 0906 by Temo Lozoya RN  Activity Management:   activity adjusted per tolerance   ambulated to bathroom     Problem: Plan of Care - These are the overarching goals to be used throughout the patient stay.    Goal: Absence of Hospital-Acquired Illness or Injury  Intervention: Prevent Skin Injury  Recent Flowsheet Documentation  Taken 10/1/2022 0906 by Temo Lozoya RN  Body Position: position changed independently     Problem: Plan of Care - These are the overarching goals to be used throughout the patient stay.    Goal: Absence of Hospital-Acquired Illness or Injury  Intervention: Prevent and Manage VTE (Venous Thromboembolism) Risk  Recent Flowsheet Documentation  Taken 10/1/2022 0906 by Temo Lozoya RN  Activity Management:   activity adjusted per tolerance   ambulated to bathroom     Problem: Plan of Care - These are the overarching goals to be used throughout the patient stay.    Goal: Readiness for Transition of Care  Intervention: Mutually Develop Transition Plan  Recent Flowsheet Documentation  Taken 10/1/2022 0900 by Temo Lozoya RN  Equipment Currently Used at Home:   shower chair   grab bar, tub/shower     Problem: Fluid Imbalance (Pneumonia)  Goal: Fluid Balance  Outcome: Ongoing, Progressing     Problem: Infection (Pneumonia)  Goal: Resolution of Infection Signs  and Symptoms  Outcome: Ongoing, Progressing     Problem: Respiratory Compromise (Pneumonia)  Goal: Effective Oxygenation and Ventilation  Outcome: Ongoing, Progressing   Goal Outcome Evaluation:

## 2022-10-01 NOTE — PLAN OF CARE
Pt alert/oriented, denies pain. On oxygen 5 L via oxymask and sat 90-92%. Productive cough and wheezing. NPO from midnight for possible bronchoscopy today. Call light within reach.

## 2022-10-01 NOTE — PROGRESS NOTES
Report given to nurse on P1. Pt. Informed of transfer to room 131. Pt. States she will inform her son.

## 2022-10-01 NOTE — ANESTHESIA CARE TRANSFER NOTE
Patient: Nguyen Olivier    Procedure: Procedure(s):  BRONCHOSCOPY WITH BAL & BRUSHINGS       Diagnosis: Pneumonia of right lower lobe due to infectious organism [J18.9]  Aspiration of foreign body, subsequent encounter [T17.908D]  Diagnosis Additional Information: No value filed.    Anesthesia Type:   General     Note:    Oropharynx: oropharynx clear of all foreign objects  Level of Consciousness: awake  Oxygen Supplementation: face mask  Level of Supplemental Oxygen (L/min / FiO2): 6  Independent Airway: airway patency satisfactory and stable  Dentition: dentition unchanged  Vital Signs Stable: post-procedure vital signs reviewed and stable  Report to RN Given: handoff report given  Patient transferred to: PACU    Handoff Report: Identifed the Patient, Identified the Reponsible Provider, Reviewed the pertinent medical history, Discussed the surgical course, Reviewed Intra-OP anesthesia mangement and issues during anesthesia, Set expectations for post-procedure period and Allowed opportunity for questions and acknowledgement of understanding      Vitals:  Vitals Value Taken Time   /58 10/01/22 1149   Temp 36.8  C (98.2  F) 10/01/22 1149   Pulse 87 10/01/22 1149   Resp 18 10/01/22 1149   SpO2 92        Electronically Signed By: LUIS E Lamb CRNA  October 1, 2022  11:54 AM

## 2022-10-01 NOTE — PLAN OF CARE
Problem: Plan of Care - These are the overarching goals to be used throughout the patient stay.    Goal: Plan of Care Review/Shift Note  Description: The Plan of Care Review/Shift note should be completed every shift.  The Outcome Evaluation is a brief statement about your assessment that the patient is improving, declining, or no change.  This information will be displayed automatically on your shift note.  Outcome: Ongoing, Progressing     Problem: Plan of Care - These are the overarching goals to be used throughout the patient stay.    Goal: Absence of Hospital-Acquired Illness or Injury  Intervention: Identify and Manage Fall Risk  Recent Flowsheet Documentation  Taken 9/30/2022 2117 by Becky Crockett RN  Safety Promotion/Fall Prevention:   assistive device/personal items within reach   nonskid shoes/slippers when out of bed   patient and family education   room door open     Problem: Infection (Pneumonia)  Goal: Resolution of Infection Signs and Symptoms  Outcome: Ongoing, Progressing     Problem: Respiratory Compromise (Pneumonia)  Goal: Effective Oxygenation and Ventilation  Outcome: Ongoing, Progressing   Goal Outcome Evaluation:    Pt came to the floor at 2100.  Pt up with 1 assist.  SOB at rest and when moving.  Pt weaned down to 3L via oxymask.  IV zosyn infusing, saline locked otherwise.  Right side inspiratory and expiratory wheezing, audible wheeze.  Productive cough.  No pain.  NPO after midnight for possible bronchoscopy tomorrow.

## 2022-10-01 NOTE — ANESTHESIA PREPROCEDURE EVALUATION
Anesthesia Pre-Procedure Evaluation    Patient: Nguyen Olivier   MRN: [id number] : 1945        Procedure : Procedure(s):  BRONCHOSCOPY          Past Medical History:   Diagnosis Date     Right Macular hole       Past Surgical History:   Procedure Laterality Date     right vitrectomy Right       No Known Allergies   Social History     Tobacco Use     Smoking status: Former Smoker     Types: Cigarettes     Smokeless tobacco: Not on file   Substance Use Topics     Alcohol use: Not on file      Wt Readings from Last 1 Encounters:   22 63 kg (139 lb)        Anesthesia Evaluation            ROS/MED HX  ENT/Pulmonary: Comment: CT:  IMPRESSION:  1.  No pulmonary embolus.  2.  Extensive consolidation throughout the right lung consistent with advanced pneumonia. Associated occlusion of right middle and right lower lobe airways. Marked thickening of the right upper lobe airways. Small right pleural effusion. Mediastinal and   hilar lymphadenopathy presumably reactive. Short-term chest CT follow-up recommended to assess for response to treatment and exclude the possibility of an underlying neoplastic process.  3.  Aneurysmal dilatation of the infrarenal abdominal aorta not fully imaged. Imaged portion measures up to 3 cm.    (+) tobacco use, Past use, recent URI, unresolved,     Neurologic:  - neg neurologic ROS     Cardiovascular:  - neg cardiovascular ROS     METS/Exercise Tolerance:     Hematologic:  - neg hematologic  ROS     Musculoskeletal:       GI/Hepatic:  - neg GI/hepatic ROS     Renal/Genitourinary:  - neg Renal ROS     Endo:  - neg endo ROS     Psychiatric/Substance Use:       Infectious Disease:       Malignancy:       Other:            Physical Exam    Airway             Respiratory Devices and Support     Face Mask 5  l/min.     Dental     Comment: Many missing teeth, Some teeth that remain are loose    (+) missing and loose      Cardiovascular          Rhythm and rate: regular and normal      Pulmonary           (+) wheezes           OUTSIDE LABS:  CBC:   Lab Results   Component Value Date    WBC 9.5 09/30/2022    WBC 11.2 (H) 09/29/2022    HGB 13.6 09/30/2022    HGB 14.6 09/29/2022    HCT 42.7 09/30/2022    HCT 43.9 09/29/2022     09/30/2022     09/29/2022     BMP:   Lab Results   Component Value Date     09/30/2022     09/29/2022    POTASSIUM 3.9 09/30/2022    POTASSIUM 3.8 09/29/2022    CHLORIDE 105 09/30/2022    CHLORIDE 101 09/29/2022    CO2 25 09/30/2022    CO2 21 (L) 09/29/2022    BUN 7.9 (L) 09/30/2022    BUN 12.3 09/29/2022    CR 0.62 09/30/2022    CR 0.69 09/29/2022    GLC 95 09/30/2022     (H) 09/29/2022     COAGS:   Lab Results   Component Value Date    PTT 24 09/29/2022    INR 1.05 09/29/2022     POC: No results found for: BGM, HCG, HCGS  HEPATIC: No results found for: ALBUMIN, PROTTOTAL, ALT, AST, GGT, ALKPHOS, BILITOTAL, BILIDIRECT, PABLO  OTHER:   Lab Results   Component Value Date    JO 8.5 (L) 09/30/2022       Anesthesia Plan    ASA Status:  3, emergent    NPO Status:  NPO Appropriate    Anesthesia Type: General.     - Airway: ETT      Maintenance: TIVA.        Consents    Anesthesia Plan(s) and associated risks, benefits, and realistic alternatives discussed. Questions answered and patient/representative(s) expressed understanding.    - Discussed:     - Discussed with:  Patient (and son)      - Extended Intubation/Ventilatory Support Discussed: Yes.      - Patient is DNR/DNI Status: No    Use of blood products discussed: No .     Postoperative Care       PONV prophylaxis: Ondansetron (or other 5HT-3)     Comments:    Other Comments: GETA, TIVA, discussed possible need to remain intubated.             Francois Jean-Baptiste MD

## 2022-10-01 NOTE — ANESTHESIA PROCEDURE NOTES
Airway         Procedure Start/Stop Times: 10/1/2022 11:17 AM  Staff -        CRNA: Nelda Read APRN CRNA       Performed By: CRNAIndications and Patient Condition       Indications for airway management: luke-procedural       Induction type:intravenous       Mask difficulty assessment: 1 - vent by mask    Final Airway Details       Final airway type: endotracheal airway       Successful airway: ETT - single and Oral  Endotracheal Airway Details        ETT size (mm): 8.0       Cuffed: yes       Cuff volume (mL): 10       Successful intubation technique: direct laryngoscopy       DL Blade Type: Adamson 2       Grade View of Cords: 1       Adjucts: stylet       Position: Center       Measured from: gums/teeth       Secured at (cm): 22       Bite block used: None    Post intubation assessment        Placement verified by: capnometry, equal breath sounds and chest rise        Number of attempts at approach: 1       Secured with: commercial tube marshall       Ease of procedure: easy       Dentition: Intact and Unchanged    Medication(s) Administered   Medication Administration Time: 10/1/2022 11:17 AM    Additional Comments       Patient with poor dentition-tooth guard not used due to only having 1 upper front tooth-avoided tooth with DL and intubation. No change in dentition with intubation.

## 2022-10-02 PROBLEM — R91.8 LUNG MASS: Status: ACTIVE | Noted: 2022-01-01

## 2022-10-02 NOTE — PROGRESS NOTES
Pt is on 4 lpm 02 sating 93% RR 18 HR 95. Breath sounds diminished. Neb given x 1 with volera meta neb inline. Rt continue to monitor.

## 2022-10-02 NOTE — PROGRESS NOTES
PULMONARY MEDICINE PROGRESS NOTE  9/30/2022    Admit Date: 9/29/2022  CODE: Full Code    Reason for Consult: Shortness of breath, PNA    Assessment/Plan:     77 y F with a 70+ pack year tobacco history, quit 1 year ago, who presents with several weeks of cough, and shortness of breath, since retina surgery, during which she underwent conscious sedation. Has significant RLL infiltrate on CT chest. Mass-like with impressive septal thickening and endobronchial occlusion in some areas scant pleural effusion. No prior imaging. No doctoring since having children many years ago.     Concern for aspiration pneumonia as a result of conscious sedation procedure recently. Unclear significance of the dental bridge - she feels she aspirated this though no evidence on CT scan. Her CT, in the context of her smoking history raises some concern given the septal thickening (lymphangitic spread?), lymphadenopathy and endobronchial occlusion.     Status post bronch on 10/1 - no dental bridge. Endobronchial tumors occluding BI and RLL. Likely has post obstructive PNA. Cytology, brushings pending.     Recommendations:    Continue zosyn. Likely start augmentin tomorrow.    Continue duoneb QID. She seems to have mild exacerbation after bronch - start prednisone daily    Continue metaneb for bronchial hygiene.    Follow up cytology from BAL and brushing. Likely advanced lung cancer. Patient has expressed she is unlikely to want any treatment and focus on quality of life for time left. She would like to know results prior to making any other advanced directive decisions.     We will continue to follow along.       Monica Johnson MD  Pulmonary and Critical Care Medicine  [facility]  Office: [phone]    ------------------------------    CCx: SOB    Interval HPI:   Up to 7 L O2 this am, now back down to 4 L. Notes that she felt breathing was very poor until nebs this morning. Now feeling better. Mild expiratory wheeze this  "morning    ----------------------  77 y F with a 70+ pack year tobacco history, quit 1 year ago, who presents with several weeks of cough, and shortness of breath, since retina surgery, during which she underwent conscious sedation. Has significant RLL infiltrate on CT chest. Mass-like with impressive septal thickening and endobronchial occlusion in some areas scant pleural effusion. No prior imaging.    Between now and then, describes not being able to find her partial bridge after eating a meal. I cannot tease out from her if she feels this was swallowed versus aspirated - there was no significant coughing during this meal, I tend to think it would have been more dramatic/obvious if it was aspirated.    VBG appropriate  WBC 11  Procal 0.11      ROS:  A 12-system review was obtained and was negative with the exception of the symptoms endorsed in the history of present illness.    Current Medications:  Reviewed in detail    Exam/Data:     Vitals  BP (P) 114/65 (BP Location: Right arm)   Pulse (P) 89   Temp (P) 97.1  F (36.2  C) (Axillary)   Resp 20   Ht 1.575 m (5' 2\")   Wt 63 kg (139 lb)   LMP  (LMP Unknown)   SpO2 (P) 90%   BMI 25.42 kg/m    BP - Mean:  [76-82] 76  I/O last 3 completed shifts:  In: 1220 [P.O.:620; I.V.:600]  Out: 10 [Blood:10]  Weight change:     EXAM:  BP (P) 114/65 (BP Location: Right arm)   Pulse (P) 89   Temp (P) 97.1  F (36.2  C) (Axillary)   Resp 20   Ht 1.575 m (5' 2\")   Wt 63 kg (139 lb)   LMP  (LMP Unknown)   SpO2 (P) 90%   BMI 25.42 kg/m      Intake/Output last 3 shifts:  I/O last 3 completed shifts:  In: 1220 [P.O.:620; I.V.:600]  Out: 10 [Blood:10]  Intake/Output this shift:  No intake/output data recorded.    Physical Exam  Gen: Alert, oriented, no distress  HEENT: NT, no SAMM  CV: RRR, no m/g/r  Resp: Diminished, crackles ar R lung base. Trace wheezing  Abd: soft, nontender, BS+  Skin: no rashes or lesions  Ext: no edema  Neuro: PERRL, nonfocal exam    DATA  All " laboratory and radiology has been personally reviewed by myself today.    IMAGING:   Personally reviewed and interpreted    9/29/22 CT Chest:  ANGIOGRAM CHEST: No pulmonary embolus. Advanced atherosclerosis of the aorta. No aortic dissection. Aneurysmal dilatation of the infrarenal abdominal aorta is not fully imaged. The imaged portion measures up to 3.0 cm.     LUNGS AND PLEURA: Extensive consolidation and interstitial thickening throughout the right lung is most dense in the right lower lobe and central portions of the right upper and right middle lobe. Airways of the right middle and right lower lobes are   occluded. Marked thickening of the right upper lobe airways. Small right pleural effusion. Mild scarring in the left upper lobe. Mild atelectasis in the left lower lobe.     MEDIASTINUM/AXILLAE: Several enlarged mediastinal and bilateral hilar lymph nodes. Small pericardial effusion.     CORONARY ARTERY CALCIFICATION: Moderate.     UPPER ABDOMEN: Small benign hepatic cysts.     MUSCULOSKELETAL: Degenerative changes of the spine.                                                    IMPRESSION:  1.  No pulmonary embolus.  2.  Extensive consolidation throughout the right lung consistent with advanced pneumonia. Associated occlusion of right middle and right lower lobe airways. Marked thickening of the right upper lobe airways. Small right pleural effusion. Mediastinal and hilar lymphadenopathy presumably reactive. Short-term chest CT follow-up recommended to assess for response to treatment and exclude the possibility of an underlying neoplastic process.  3.  Aneurysmal dilatation of the infrarenal abdominal aorta not fully imaged. Imaged portion measures up to 3 cm.

## 2022-10-02 NOTE — PLAN OF CARE
Problem: Gas Exchange Impaired  Goal: Optimal Gas Exchange  Outcome: Ongoing, Progressing  Intervention: Optimize Oxygenation and Ventilation  Recent Flowsheet Documentation  Taken 10/2/2022 3483 by Rachna Wakefield, RN  Head of Bed (HOB) Positioning: HOB at 20 degrees     Patient currently sating on 4L Oxymizer nasal cannula. Was on 8L oxymask this am and was having difficulty breathing when first waking this am but improved after neb. SBA to the bathroom, drops to 80s but recovers once at rest. Patient denies pain or nausea. Given scheduled nebs, steroids, antibiotics and encouraged to use I.S. every 2 hours while awake.

## 2022-10-02 NOTE — PROGRESS NOTES
.  Hospitalist Progress Note    Assessment/Plan    Active Problems:    Acute respiratory failure with hypoxia (H)      Nguyen Olivier is a 77 year old woman with a history of tobacco use disorder who was admitted on 9/29/2022 due to acute hypoxic respiratory failure.    CT showed extensive consolidation throughout the right lung consistent with advanced pneumonia with associated occlusion of right middle and right lower lobe airways, marked thickening of the right upper lobe airways as well as mediastinal,hilar lymphadenopathy and 3cm aneurysmal dilatation of the infrarenal abdominal aorta.     Bronchoscopy 10/1/2022 revealed endobronchial tumor occluding airway and possible postobstructive pneumonia.  Cytology result is pending.     Suspected postobstructive pneumonia of the right lung  Acute hypoxic respiratory failure  Bronchoscopy 10/1/2022 revealed endobronchial tumor occluding airway and possible postobstructive pneumonia.  Cytology result is pending.    Continue IV Zosyn-will possibly switch to Augmentin tomorrow as per pulmonologist  Follow sputum culture  Follow-up bronchial washing cytology  Continue guaifenesin 600 mg twice daily, nebs prn, mtaneb  Continuous pulse oximetry, supplementary oxygen to maintain SPO2 greater than 92%, currently needing 3-4L by NC  Pulmonology [id number] assistance      Barriers to Discharge:  Hypoxia, pending procedure    Anticipated discharge date/Disposition: 2-3 days    Subjective  She states she was feeling short of breath earlier this morning but feels better now.  She was requiring up to 8 L of intranasal oxygen earlier this morning as per nurse; now down to 4 L.  Otherwise, no acute issues   Objective    Vital signs in last 24 hours  Temp:  [97.1  F (36.2  C)-98.2  F (36.8  C)] 97.2  F (36.2  C)  Pulse:  [84-95] 93  Resp:  [17-20] 18  BP: ()/(50-65) 101/61  SpO2:  [86 %-95 %] 94 % [unfilled] O2 Device: Oxymask    Weight:   [unfilled] Weight  change:     Intake/Output last 3 shifts  I/O last 3 completed shifts:  In: 1220 [P.O.:620; I.V.:600]  Out: 10 [Blood:10]  Body mass index is 25.42 kg/m .    Physical Exam    General Appearance:    Alert, cooperative, no distress, appears stated age   Lungs:     Diminished BS, exp wheezing posteriorly+   Cardiovascular:    RRR   Abdomen:     Soft, non-tender, ND   Neurologic:   Awake, alert, grossly normal     Pertinent Labs   Lab Results: personally reviewed.   Recent Labs   Lab 09/30/22  1011 09/29/22  2042    137   CO2 25 21*   BUN 7.9* 12.3     Recent Labs   Lab 10/02/22  0712 09/30/22  1011 09/29/22  2042   WBC  --  9.5 11.2*   HGB  --  13.6 14.6   HCT  --  42.7 43.9    212 246     No results for input(s): CKTOTAL, TROPONINI in the last 168 hours.    Invalid input(s): TROPONINT, CKMBINDEX  Invalid input(s): POCGLUFGR    Reviewed labs in last 24 hours.    Advanced Care Planning:  Discharge Planning discussed with patient    Romi Gaytan MD  Hospitalist

## 2022-10-02 NOTE — PROGRESS NOTES
RESPIRATORY CARE NOTE   Patient is on 4 lpm oxymizer, BS clear on left, exp wheezes on right gave Duoneb/ Volara treatment x3, BS post treatment increased aeration, with persistent wheezes on right patient perceives significant improvement, patient tolerated well.     Elio Ogden, RT

## 2022-10-02 NOTE — PLAN OF CARE
Problem: Plan of Care - These are the overarching goals to be used throughout the patient stay.    Goal: Plan of Care Review/Shift Note  Description: The Plan of Care Review/Shift note should be completed every shift.  The Outcome Evaluation is a brief statement about your assessment that the patient is improving, declining, or no change.  This information will be displayed automatically on your shift note.  Outcome: Ongoing, Progressing     Problem: Risk for Delirium  Goal: Optimal Coping  Outcome: Ongoing, Progressing     Problem: Infection (Pneumonia)  Goal: Resolution of Infection Signs and Symptoms  Outcome: Ongoing, Progressing     Problem: Respiratory Compromise (Pneumonia)  Goal: Effective Oxygenation and Ventilation  Outcome: Ongoing, Progressing   Goal Outcome Evaluation:      Pt had no issues tonight after her bronchoscopy today.  Tolerating regular diet, voiding with 1 assist to the bathroom.  Pt is saline locked, receiving zosyn IV.  Productive congestive cough.  Lungs have wheezing, more on the right side but improving a lot tonight.  Pt said her SOB is much better also tonight.  Scheduled nebs and percussion.  Pt was on 4L via oxymask but sats dropped to 86% while sleeping so I turned her up to 5L.  Family was visiting tonight, pt pleasant and cooperative, didn't mention what the MD said about her procedure.

## 2022-10-02 NOTE — PLAN OF CARE
Problem: Plan of Care - These are the overarching goals to be used throughout the patient stay.    Goal: Optimal Comfort and Wellbeing  Outcome: Ongoing, Progressing   Goal Outcome Evaluation:           Denies pain. On oxygen 7 L via oxymask and sat 90-93%. Productive cough and wheezing. Bronchoscopy completed, awaits final result. Call light within reach.

## 2022-10-03 NOTE — PROGRESS NOTES
.  Hospitalist Progress Note    Assessment/Plan  Nguyen Olivier is a 77 year old woman with a history of tobacco use disorder who was admitted on 9/29/2022 due to acute hypoxic respiratory failure.    CT showed extensive consolidation throughout the right lung consistent with advanced pneumonia with associated occlusion of right middle and right lower lobe airways, marked thickening of the right upper lobe airways as well as mediastinal,hilar lymphadenopathy and 3cm aneurysmal dilatation of the infrarenal abdominal aorta.     Bronchoscopy 10/1/2022 revealed endobronchial tumor occluding airway and possible postobstructive pneumonia.  Cytology result is pending.     Suspected postobstructive pneumonia of the right lung  Acute hypoxic respiratory failure  Bronchoscopy 10/1/2022 revealed endobronchial tumor occluding airway and possible postobstructive pneumonia.  Cytology result is pending.    Off IV Zosyn  Continue Augmentin- started 10/3/2022  Follow sputum culture  Follow-up bronchial washing cytology  Continue guaifenesin 600 mg twice daily, nebs prn, mtaneb  Continuous pulse oximetry, supplementary oxygen to maintain SPO2 greater than 92%, currently needing 3-4L by NC  Pulmonology [id number] assistance      Barriers to Discharge:  Hypoxia, pending procedure    Anticipated discharge date/Disposition: 1-2 days    Subjective  No new complaint today and no acute events overnight. She still requiring 4 L of supplemental oxygen.  She lives alone and was independent for ADLs at baseline.    Objective    Vital signs in last 24 hours  Temp:  [97.8  F (36.6  C)-98.3  F (36.8  C)] 98.1  F (36.7  C)  Pulse:  [76-97] 85  Resp:  [18-20] 20  BP: ()/(49-54) 105/54  SpO2:  [91 %-95 %] 95 % [unfilled] O2 Device: Oxymask    Weight:   [unfilled] Weight change:     Intake/Output last 3 shifts  I/O last 3 completed shifts:  In: 840 [P.O.:840]  Out: -   Body mass index is 25.42 kg/m .    Physical  Exam    General Appearance:    Alert, cooperative, no distress, appears stated age   Lungs:     Diminished BS, exp wheezing posteriorly+   Cardiovascular:    RRR   Abdomen:     Soft, non-tender, ND   Neurologic:   Awake, alert, grossly normal     Pertinent Labs   Lab Results: personally reviewed.   Recent Labs   Lab 09/30/22  1011 09/29/22 2042    137   CO2 25 21*   BUN 7.9* 12.3     Recent Labs   Lab 10/02/22  0712 09/30/22  1011 09/29/22 2042   WBC  --  9.5 11.2*   HGB  --  13.6 14.6   HCT  --  42.7 43.9    212 246     No results for input(s): CKTOTAL, TROPONINI in the last 168 hours.    Invalid input(s): TROPONINT, CKMBINDEX  Invalid input(s): POCGLUFGR    Reviewed labs in last 24 hours.    Advanced Care Planning:  Discharge Planning discussed with patient    Romi Gaytan MD  Hospitalist

## 2022-10-03 NOTE — PLAN OF CARE
Occupational Therapy Discharge Summary    Reason for therapy discharge:    Discharged to remains in hospital    Progress towards therapy goal(s). See goals on Care Plan in [facility] electronic health record for goal details.  Goals met    Therapy recommendation(s):    No further therapy is recommended.

## 2022-10-03 NOTE — PROGRESS NOTES
Occupational Therapy     10/03/22 1100   Quick Adds   Type of Visit Initial Occupational Therapy Evaluation   Living Environment   People in Home alone   Current Living Arrangements apartment   Home Accessibility no concerns  (elevator)   Living Environment Comments Patient independent with ADLs/IADLs at baseline. No home oxgyen   Self-Care   Equipment Currently Used at Home shower chair;grab bar, tub/shower   Fall history within last six months no   General Information   Onset of Illness/Injury or Date of Surgery 09/29/22   Referring Physician Rmoi Gaytan MD   Patient/Family Therapy Goal Statement (OT) get home   Additional Occupational Profile Info/Pertinent History of Current Problem Nguyen Olivier is a 77 year old woman with a history of tobacco use disorder who was admitted on 9/29/2022 due to acute hypoxic respiratory failure.   Suspected postobstructive pneumonia of the right lung. Now on 4L NC   Existing Precautions/Restrictions no known precautions/restrictions   Cognitive Status Examination   Orientation Status orientation to person, place and time   Range of Motion Comprehensive   General Range of Motion no range of motion deficits identified   Strength Comprehensive (MMT)   General Manual Muscle Testing (MMT) Assessment no strength deficits identified   Bed Mobility   Bed Mobility supine-sit;sit-supine   Supine-Sit [place] (Bed Mobility) independent   Sit-Supine [place] (Bed Mobility) independent   Transfers   Transfers sit-stand transfer;shower transfer   Sit-Stand Transfer   Sit-Stand [place] (Transfers) independent   Sit/Stand Transfer Comments Independently donned socks/underwear   Shower Transfer   Shower Transfer Comments STS from shower chair in room independently   Clinical Impression   Criteria for Skilled Therapeutic Interventions Met (OT) Yes, treatment indicated   OT Diagnosis decreased endurance for ADLs   Influenced by the following impairments pneumonia    OT Problem List-Impairments impacting ADL problems related to;activity tolerance impaired   Assessment of Occupational Performance 1-3 Performance Deficits   Identified Performance Deficits endurance for ADLs   Planned Therapy Interventions (OT) ADL retraining   Clinical Decision Making Complexity (OT) low complexity   Risk & Benefits of therapy have been explained evaluation/treatment results reviewed;care plan/treatment goals reviewed   OT Discharge Planning   OT Discharge Recommendation (DC Rec) home   Total Evaluation Time (Minutes)   Total Evaluation Time (Minutes) 15   OT Goals   Therapy Frequency (OT) One time eval and treatment   OT Predicted Duration/Target Date for Goal Attainment 10/04/22   OT Goals Hygiene/Grooming   OT: Hygiene/Grooming independent

## 2022-10-03 NOTE — PROGRESS NOTES
Pt is on 4 lpm oximizer, sats 95% HR 89 RR 18. Breath sounds diminished both pre and post neb tx. Neb given x 1 with meta neb inline. Pt tolerated well. Rt continue to monitor.

## 2022-10-03 NOTE — PROGRESS NOTES
10/03/22 1430   Quick Adds   Type of Visit Initial PT Evaluation   Living Environment   People in Home alone   Current Living Arrangements independent living facility   Home Accessibility no concerns   Self-Care   Usual Activity Tolerance excellent   Current Activity Tolerance moderate   Equipment Currently Used at Home none   General Information   Onset of Illness/Injury or Date of Surgery 09/29/22   Referring Physician Romi Gaytan MD   Patient/Family Therapy Goals Statement (PT) return home   Pertinent History of Current Problem (include personal factors and/or comorbidities that impact the POC) 77 year old woman with a history of tobacco use disorder who was admitted on 9/29/2022 due to acute hypoxic respiratory failure.   Existing Precautions/Restrictions oxygen therapy device and L/min  (4L)   Strength (Manual Muscle Testing)   Strength (Manual Muscle Testing) strength is WFL   Bed Mobility   Bed Mobility no deficits identified   Transfers   Transfers sit-stand transfer   Sit-Stand Transfer   Sit-Stand [place] (Transfers) supervision   Assistive Device (Sit-Stand Transfers)   (none)   Gait/Stairs (Locomotion)   [place] Level (Gait) contact guard   Assistive Device (Gait)   (none)   Distance in Feet (Required for LE Total Joints) 75'   Pattern (Gait) swing-through   Deviations/Abnormal Patterns (Gait) gait speed decreased   Comment, (Gait/Stairs) Sats 88% on 4L after amb.   Clinical Impression   Criteria for Skilled Therapeutic Intervention Yes, treatment indicated   PT Diagnosis (PT) Impaired functional mobility   Influenced by the following impairments Fatigue, SOB   Functional limitations due to impairments Impaired gait, endurance   Clinical Presentation (PT Evaluation Complexity) Stable/Uncomplicated   Clinical Presentation Rationale Presents as diagnosed   Clinical Decision Making (Complexity) moderate complexity   Planned Therapy Interventions (PT) balance training;gait  training;transfer training   Anticipated Equipment Needs at Discharge (PT)   (none)   Risk & Benefits of therapy have been explained patient   PT Discharge Planning   PT Discharge Recommendation (DC Rec) home with assist   Plan of Care Review   Plan of Care Reviewed With patient   Total Evaluation Time   Total Evaluation Time (Minutes) 10   Physical Therapy Goals   PT Frequency Daily   PT Predicted Duration/Target Date for Goal Attainment 10/10/22   PT Goals Bed Mobility;Transfers;Gait   PT: Bed Mobility Independent;Supine to/from sit   PT: Transfers Independent;Sit to/from stand;Bed to/from chair   PT: Gait Independent;150 feet       Maral Etienne, PT, DPT  10/3/2022

## 2022-10-03 NOTE — PROGRESS NOTES
"CLINICAL NUTRITION NOTE    Pt identified at nutrition risk via nursing nutrition risk screen with \"unsure' of weight loss. Pt indicated no decrease in intake.     Current weight 63.0 kg (139 lb) 9/30  Only EHR weight available 146 lb 8/3/22  = 4.6% weight loss in 2 months. Not clinically significant    Current weight up from admit weight (likely stated) -136 lb.     Pt eating 100% of meals at 1643 kcal, 76 g protein, meeting nutrition needs      Pt is not currently at nutrition risk. Will sign off and follow peripherally unless consulted.       "

## 2022-10-03 NOTE — PROGRESS NOTES
RESPIRATORY CARE NOTE   Patient is on 4 LPM Oxymizer, BS diminished, gave Duoneb treatment x2 EDUARDO Fritz post treatment no change, patient perceives moderate improvement, patient tolerated treatment well.     Sandeep Kay, RT

## 2022-10-03 NOTE — PROGRESS NOTES
PULMONARY MEDICINE PROGRESS NOTE  9/30/2022    Admit Date: 9/29/2022  CODE: Full Code    Reason for Consult: Shortness of breath, PNA    Assessment/Plan:     77 y F with a 70+ pack year tobacco history, quit 1 year ago, who presents with several weeks of cough, and shortness of breath, since retina surgery, during which she underwent conscious sedation. Has significant RLL infiltrate on CT chest. Mass-like with impressive septal thickening and endobronchial occlusion in some areas scant pleural effusion. No prior imaging. No doctoring since having children many years ago.     Concern for aspiration pneumonia as a result of conscious sedation procedure recently. Unclear significance of the dental bridge - she feels she aspirated this though no evidence on CT scan. Her CT, in the context of her smoking history raises some concern given the septal thickening (lymphangitic spread?), lymphadenopathy and endobronchial occlusion.     Status post bronch on 10/1 - no dental bridge. Endobronchial tumors occluding BI and RLL. Likely has post obstructive PNA. Cytology, brushings pending.     Recommendations:    Transitioned to augmentin today for total 10 day course for post-obstructive PNA    Continue duoneb QID. She seems to have mild exacerbation after bronch - started prednisone daily at lower dose given her concerns for side effects.    Continue metaneb for bronchial hygiene.    Requested pharmacy consult for inhaler pricing - she should be discharged with ideally triple inhaler therapy, nebulizer machine and supplies, and duoneb.    Follow up cytology from BAL and brushing. Likely advanced lung cancer. Patient has expressed she is unlikely to want any treatment and focus on quality of life for time left. She would like to know results prior to making any other advanced directive decisions.     Seems like she can start discharge planning - she will go with oxygen, and should have ambulatory sats checked. She does not need  "to remain inpatient awaiting bronch results. I will contact her with those and follow up with me.    We will continue to follow along.       Monica Johnson MD  Pulmonary and Critical Care Medicine  [facility]  Office: [phone]    ------------------------------    CCx: SOB    Interval HPI:   Down to 4 L NC O2    ----------------------  77 y F with a 70+ pack year tobacco history, quit 1 year ago, who presents with several weeks of cough, and shortness of breath, since retina surgery, during which she underwent conscious sedation. Has significant RLL infiltrate on CT chest. Mass-like with impressive septal thickening and endobronchial occlusion in some areas scant pleural effusion. No prior imaging.    Between now and then, describes not being able to find her partial bridge after eating a meal. I cannot tease out from her if she feels this was swallowed versus aspirated - there was no significant coughing during this meal, I tend to think it would have been more dramatic/obvious if it was aspirated.    VBG appropriate  WBC 11  Procal 0.11      ROS:  A 12-system review was obtained and was negative with the exception of the symptoms endorsed in the history of present illness.    Current Medications:  Reviewed in detail    Exam/Data:     Vitals  /54 (BP Location: Right arm)   Pulse 85   Temp 98.1  F (36.7  C) (Oral)   Resp 20   Ht 1.575 m (5' 2\")   Wt 63 kg (139 lb)   LMP  (LMP Unknown)   SpO2 95%   BMI 25.42 kg/m       I/O last 3 completed shifts:  In: 840 [P.O.:840]  Out: -   Weight change:     EXAM:  /54 (BP Location: Right arm)   Pulse 85   Temp 98.1  F (36.7  C) (Oral)   Resp 20   Ht 1.575 m (5' 2\")   Wt 63 kg (139 lb)   LMP  (LMP Unknown)   SpO2 95%   BMI 25.42 kg/m      Intake/Output last 3 shifts:  I/O last 3 completed shifts:  In: 840 [P.O.:840]  Out: -   Intake/Output this shift:  No intake/output data recorded.    Physical Exam  Gen: Alert, oriented, no " distress  HEENT: NT, no SAMM  CV: RRR, no m/g/r  Resp: Diminished, crackles ar R lung base. Trace wheezing  Abd: soft, nontender, BS+  Skin: no rashes or lesions  Ext: no edema  Neuro: PERRL, nonfocal exam    DATA  All laboratory and radiology has been personally reviewed by myself today.    IMAGING:   Personally reviewed and interpreted    9/29/22 CT Chest:  ANGIOGRAM CHEST: No pulmonary embolus. Advanced atherosclerosis of the aorta. No aortic dissection. Aneurysmal dilatation of the infrarenal abdominal aorta is not fully imaged. The imaged portion measures up to 3.0 cm.     LUNGS AND PLEURA: Extensive consolidation and interstitial thickening throughout the right lung is most dense in the right lower lobe and central portions of the right upper and right middle lobe. Airways of the right middle and right lower lobes are   occluded. Marked thickening of the right upper lobe airways. Small right pleural effusion. Mild scarring in the left upper lobe. Mild atelectasis in the left lower lobe.     MEDIASTINUM/AXILLAE: Several enlarged mediastinal and bilateral hilar lymph nodes. Small pericardial effusion.     CORONARY ARTERY CALCIFICATION: Moderate.     UPPER ABDOMEN: Small benign hepatic cysts.     MUSCULOSKELETAL: Degenerative changes of the spine.                                                    IMPRESSION:  1.  No pulmonary embolus.  2.  Extensive consolidation throughout the right lung consistent with advanced pneumonia. Associated occlusion of right middle and right lower lobe airways. Marked thickening of the right upper lobe airways. Small right pleural effusion. Mediastinal and hilar lymphadenopathy presumably reactive. Short-term chest CT follow-up recommended to assess for response to treatment and exclude the possibility of an underlying neoplastic process.  3.  Aneurysmal dilatation of the infrarenal abdominal aorta not fully imaged. Imaged portion measures up to 3 cm.

## 2022-10-03 NOTE — PLAN OF CARE
"Goal Outcome Evaluation:    Pt slept intermittently throughout the night. No complaints of pain. No nausea noted. Inspiratory/Expiratory wheezes noted. Congested, non productive cough. Pt stated that she kept feeling as though she could not catch her breath and that the roof of her mouth felt \"heavy.\" PRN inhaler was given. Pt also stated that she was feeling anxious at times which was contributing to her feeling of not being able to breathe. Pt also requested to switch back to the oxymask for overnight. 4L O2. Pulse ox applied. Slept well after oxymask was applied. Afebrile. IV Zosyn given. Plan to change to po abx in the am. IV SL. Pt is alert and orientated. Up with an assist of one with transfers and ambulation. Will continue to monitor.     "

## 2022-10-03 NOTE — PLAN OF CARE
Problem: Gas Exchange Impaired  Goal: Optimal Gas Exchange  Outcome: Ongoing, Progressing  Pt remains on Oximyzer at 4L. PRN inhalers and scheduled nebulizer helping per patient.     Problem: Plan of Care - These are the overarching goals to be used throughout the patient stay.    Goal: Plan of Care Review/Shift Note  Outcome: Ongoing, Progressing   Updated patient on plan of care. Answered any questions/concerns patient has.     GUILLAUME PENA RN

## 2022-10-04 NOTE — PROGRESS NOTES
[facility] obtained qualifying documentation for home oxygen needs. Confirmed that patient wanted [facility] to service her . Anticipate transport tank to be delivered to bedside and once she has been discharged from the hospital we will complete home set up. Please call [facility] with questions/concerns. [phone].

## 2022-10-04 NOTE — PLAN OF CARE
Physical Therapy Discharge Summary    Reason for therapy discharge:    Discharged to home.    Progress towards therapy goal(s). See goals on Care Plan in [facility] electronic health record for goal details.  Goals partially met.  Barriers to achieving goals:   discharge from facility.    Therapy recommendation(s):    Continue home exercise program.

## 2022-10-04 NOTE — PROGRESS NOTES
Pt remains on 4 lpm oxymizer. sats 93% RR 18 HR 89. Breath sounds diminished both pre and post neb tx. Neb given x 2 with meta neb x 2. Rt continue to monitor.

## 2022-10-04 NOTE — PLAN OF CARE
Pt discharged home at 1630 with son here to provide transportation. Pt received wheelchair ride to front lobby from writer.    AVS discussed with pt and pt's son. All questions addressed. Pt received home O2 equipment and all discharge meds. All belongings sent home with pt.

## 2022-10-04 NOTE — PROGRESS NOTES
RESPIRATORY CARE NOTE   Patient is dressed and ready for discharge. Declined last Joann tx.     Paola Epstein, RT

## 2022-10-04 NOTE — PROGRESS NOTES
Patient discharging home today.  Dr. Johnson plans to follow-up with biopsy results.    Serenity Borrero MD  Pulmonary and Critical Care  (p) [phone]

## 2022-10-04 NOTE — PLAN OF CARE
Problem: Plan of Care - These are the overarching goals to be used throughout the patient stay.    Goal: Plan of Care Review/Shift Note  Description: The Plan of Care Review/Shift note should be completed every shift.  The Outcome Evaluation is a brief statement about your assessment that the patient is improving, declining, or no change.  This information will be displayed automatically on your shift note.  10/4/2022 1600 by Isai Hook RN  Outcome: Ongoing, Progressing  10/4/2022 1600 by Isai Hook RN  Outcome: Adequate for Care Transition     Problem: Infection (Pneumonia)  Goal: Resolution of Infection Signs and Symptoms  10/4/2022 1600 by Isai Hook RN  Outcome: Ongoing, Progressing  10/4/2022 1600 by Isai Hook RN  Outcome: Adequate for Care Transition     Problem: Respiratory Compromise (Pneumonia)  Goal: Effective Oxygenation and Ventilation  10/4/2022 1600 by Isai Hook RN  Outcome: Ongoing, Progressing  10/4/2022 1600 by Isai Hook RN  Outcome: Adequate for Care Transition  Intervention: Promote Airway Secretion Clearance  Recent Flowsheet Documentation  Taken 10/4/2022 0900 by Isai Hook RN  Cough And Deep Breathing: done independently per patient   Goal Outcome Evaluation:  Patient is alert and oriented x4, she denied of pain this am. She in on 02 at 4  Liter. 02 sat at 92% to 94% with 02. Did assessment for home 02- see flow sheet. At 1250 after duoneb treatment by Rt, pt reported of left mid-back- chest pain. She rated pain at 6/10, tylenol was given and pain did come down to 2/10.  Pt is planing to be discharge to home at 1600 to 1700. Son will come to pick patient up.

## 2022-10-04 NOTE — PLAN OF CARE
Problem: Infection (Pneumonia)  Goal: Resolution of Infection Signs and Symptoms  Outcome: Ongoing, Progressing   Goal Outcome Evaluation:      Pt receiving percussive therapy and nebs QID per RT. She reports a cough that is productive of green sputum at times. Currently on oxygen at 4 lpm/oxymask; for saturations in the low to mid 90'S.  LS after nebs: expiratory wheezes throughout. Pt occasionally dyspneic upon exertion. Encouraging fluid intake to loosen secretions- also receiving Mucinex and PO ABX.    She is hoping to discharge home with oxygen.

## 2022-10-04 NOTE — PROGRESS NOTES
Respiratory Care Note:    Pt remains on 4 L/min via nasal cannula, SpO2 91%.  Duoneb tx completed as scheduled along with nils. Pt tolerated therapy well. BBS expiratory wheezes this morning; clear throughout left lung and crackles throughout right lung this afternoon. Strong, productive cough. Small thick brown/yellow secretions. BBS improved post tx. RT following.    Rina Pop, RT

## 2022-10-04 NOTE — PROGRESS NOTES
Care Management Discharge Note    Discharge Date: 10/04/2022       Discharge Disposition: Home    Discharge Services:  Establish pcp and post hopsital follow up appointment.    Discharge DME: Oxygen    Discharge Transportation:      Private pay costs discussed: Not applicable    PAS Confirmation Code:    Patient/family educated on Medicare website which has current facility and service quality ratings:      Education Provided on the Discharge Plan:  Per team  Persons Notified of Discharge Plans: yes  Patient/Family in Agreement with the Plan:      Handoff Referral Completed:     Additional Information:  Post Hospital Follow Up Appointment:  Date/Time: Friday, October 7th, 2022 @ 9:40 AM  Provider: Skye Denise NP  [address] [address]  Phone: [phone]  *Please call 24 hours before scheduled appointment if you albino to cancel or reschedule.        ESTEFANY Parada, LICSW    10/04/2022   11:05 AM

## 2022-10-04 NOTE — DISCHARGE SUMMARY
[facility]  Hospitalist Discharge Summary      Date of Admission:  9/29/2022  Date of Discharge:  10/4/2022  Discharging Provider: Romi Gaytan MD  Discharge Service: Hospitalist Service    Discharge Diagnoses   Acute hypoxic respiratory failure  Postobstructive pneumonia  Lung mass  Pneumonia secondary Corynebactrerium striatum   Aspiration PNA      Follow-ups Needed After Discharge   Follow-up Appointments     Follow-up and recommended labs and tests       Follow up with primary care provider within 7 days for hospital follow-   up.  Follow-up lung biopsy results.  Continue Augmentin for the next 6   days.  Continue prednisone for the next 3 days.  Follow-up with   pulmonologist as arranged.             Unresulted Labs Ordered in the Past 30 Days of this Admission     Date and Time Order Name Status Description    10/1/2022 11:51 AM Cytology, non-gynecologic In process     10/1/2022 11:51 AM Cytology non gyn - Bronchial Brushings Sites In process     10/1/2022 11:51 AM Cytology non gyn - BAL Sites In process     10/1/2022  9:37 AM Respiratory Aerobic Bacterial Culture Preliminary       These results will be followed up by PCP and pulmonologist    Discharge Disposition   Discharged to home  Condition at discharge: Stable      Hospital Course   Nguyen Olivier is a 77 year old woman with history of tobacco use disorder admitted on 9/29/2022 due to acute hypoxic respiratory failure.     CT showed extensive consolidation throughout the right lung consistent with advanced pneumonia with associated occlusion of right middle and right lower lobe airways, marked thickening of the right upper lobe airways as well as mediastinal,hilar lymphadenopathy and 3cm aneurysmal dilatation of the infrarenal abdominal aorta. Bronchoscopy 10/1/2022 revealed endobronchial tumor occluding airway and possible postobstructive pneumonia.  Results of cytology from BAL and brushing is pending.    She  received bronchodilators, steroid therapy, as well as IV antibiotics during hospital stay and was subsequently transitioned to p.o. antibiotics for postobstructive pneumonia as recommended by pulmonologist.  Symptoms improved with antibiotic therapy, bronchodilators and steroid therapy, however, she continues to require 4 L of supplemental oxygen therefore she will be discharged home with home oxygen.  She is clinically stable for discharge at this time.    Consultations This Hospital Stay   CARE MANAGEMENT / SOCIAL WORK IP CONSULT  PULMONARY IP CONSULT  PULMONARY IP CONSULT  PHYSICAL THERAPY ADULT IP CONSULT  OCCUPATIONAL THERAPY ADULT IP CONSULT  PHARMACY IP CONSULT    Code Status   Full Code    Time Spent on this Encounter   I, Romi Gaytan MD, personally saw the patient today and spent greater than 30 minutes discharging this patient.       Romi Gaytan MD  [address] [address]  Phone: [phone]  Fax: [phone]  ______________________________________________________________________    Physical Exam   Vital Signs: Temp: 99  F (37.2  C) Temp src: Oral BP: 112/56 Pulse: 90   Resp: 18 SpO2: 92 % O2 Device: Oxymask Oxygen Delivery: 4 LPM  Weight: 143 lbs 14.4 oz    General appearance: Awake, Alert, Cooperative, not in any obvious distress and appears stated age   HEENT: Normocephalic, atraumatic, conjunctiva clear without icterus and ears without discharge  Lungs: Diminished air entry bilaterally.  Cardiovascular: Regular Rate and Rythm, normal apical impulse, normal S1 and S2, no lower extremity edema bilaterally  Abdomen: Soft, non-tender and Non-distended, active bowel sounds  Skin: Skin color, texture normal and bruising or bleeding. No rashes or lesions over face, neck, arms and legs, turgor normal.  Musculoskeletal: No bony deformities or joint tenderness. Normal ROM upon flexion & extension.   Neurologic: Alert & Oriented X 3,  Facial symmetry preserved and upper & lower extremities moving well with symmetry  Psychiatric: Calm, normal eye contact and normal affect         Primary Care Physician   Physician No Ref-Primary    Discharge Orders      Reason for your hospital stay    Acute hypoxic respiratory failure  Endobronchial tumor   Postobstructive pneumonia.     Follow-up and recommended labs and tests     Follow up with primary care provider within 7 days for hospital follow- up.  Follow-up lung biopsy results.  Continue Augmentin for the next 6 days.  Continue prednisone for the next 3 days.  Follow-up with pulmonologist as arranged.     Activity    Your activity upon discharge: activity as tolerated     Nebulizer and Supplies Order for DME - ONLY FOR DME    I, the undersigned, certify that the above prescribed supplies are medically necessary for this patient and is both reasonable and necessary in reference to accepted standards of medical and necessary in reference to accepted standards of medical practice in the treatment of this patient's condition and is not prescribed as a convenience.      Home Oxygen Order for DME - ONLY FOR DME    Oxygen Documentation:   I certify that this patient, Nguyen Olivier has been under my care (or a nurse practitioner or physician's assistant working with me). This is the face-to-face encounter for oxygen medical necessity.      Nguyen Olivier is now in a chronic stable state and continues to require supplemental oxygen. 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  If portability is ordered, is the patient mobile within the home? yes    **Patients who qualify for home O2 coverage under the CMS guidelines require ABG tests or O2 sat readings obtained closest to, but no earlier than 2 days  prior to the discharge, as evidence of the need for home oxygen therapy. Testing must be performed while patient is in the chronic stable state. See notes for O2 sats.**     Diet    Follow this diet upon discharge: Orders Placed This Encounter      Room Service      Regular Diet Adult       Significant Results and Procedures   Results for orders placed or performed during the hospital encounter of 09/29/22   CT Chest Pulmonary Embolism w Contrast    Narrative    EXAM: CT CHEST PULMONARY EMBOLISM W CONTRAST  LOCATION: [facility]  DATE/TIME: 9/29/2022 10:27 PM    INDICATION: sob hypoxia  COMPARISON: Chest radiograph today.  TECHNIQUE: CT chest pulmonary angiogram during arterial phase injection of IV contrast. Multiplanar reformats and MIP reconstructions were performed. Dose reduction techniques were used.   CONTRAST: 100ml isovue 370    FINDINGS:  ANGIOGRAM CHEST: No pulmonary embolus. Advanced atherosclerosis of the aorta. No aortic dissection. Aneurysmal dilatation of the infrarenal abdominal aorta is not fully imaged. The imaged portion measures up to 3.0 cm.    LUNGS AND PLEURA: Extensive consolidation and interstitial thickening throughout the right lung is most dense in the right lower lobe and central portions of the right upper and right middle lobe. Airways of the right middle and right lower lobes are   occluded. Marked thickening of the right upper lobe airways. Small right pleural effusion. Mild scarring in the left upper lobe. Mild atelectasis in the left lower lobe.    MEDIASTINUM/AXILLAE: Several enlarged mediastinal and bilateral hilar lymph nodes. Small pericardial effusion.    CORONARY ARTERY CALCIFICATION: Moderate.    UPPER ABDOMEN: Small benign hepatic cysts.    MUSCULOSKELETAL: Degenerative changes of the spine.      Impression    IMPRESSION:  1.  No pulmonary embolus.  2.  Extensive consolidation throughout the right lung consistent with advanced pneumonia. Associated  occlusion of right middle and right lower lobe airways. Marked thickening of the right upper lobe airways. Small right pleural effusion. Mediastinal and   hilar lymphadenopathy presumably reactive. Short-term chest CT follow-up recommended to assess for response to treatment and exclude the possibility of an underlying neoplastic process.  3.  Aneurysmal dilatation of the infrarenal abdominal aorta not fully imaged. Imaged portion measures up to 3 cm.   XR Chest Port 1 View    Narrative    EXAM: XR CHEST PORT 1 VIEW  LOCATION: [facility]  DATE/TIME: 9/29/2022 8:49 PM    INDICATION: Shortness of breath. Evaluate for foreign body aspiration.  COMPARISON: None.      Impression    IMPRESSION:     Patchy airspace and interstitial opacities in the right lung, suspicious for pneumonia. Consider follow-up imaging after treatment to document resolution.    Probable small right pleural effusion.    No focal airspace disease in the left lung. The left costophrenic angle is not included in the field-of-view, limiting evaluation. No definite left pleural effusion or pneumothorax.    The cardiomediastinal silhouette is unremarkable. Aortic calcifications.   Neck soft tissue XR    Narrative    EXAM: XR NECK SOFT TISSUE  LOCATION: [facility]  DATE/TIME: 9/29/2022 11:36 PM    INDICATION: eval fb  COMPARISON: None.  TECHNIQUE: CR Neck Soft Tissue.      Impression    IMPRESSION: No radiopaque foreign body is identified. The airway is patent.   XR Chest Port 1 View    Narrative    EXAM: XR CHEST PORT 1 VIEW  LOCATION: [facility]  DATE/TIME: 10/2/2022 4:05 PM    INDICATION: Increasing O2 needs  COMPARISON: 09/29/2022      Impression    IMPRESSION: Airspace opacities throughout the right lung with moderate right pleural effusion, increased compared to 09/29/2022. Clear left lung and pleural space. No pneumothorax.       Discharge Medications   Current Discharge  Medication List      START taking these medications    Details   albuterol (PROAIR HFA/PROVENTIL HFA/VENTOLIN HFA) 108 (90 Base) MCG/ACT inhaler Inhale 2 puffs into the lungs every 6 hours as needed for wheezing  Qty: 18 g, Refills: 0    Comments: Pharmacy may dispense brand covered by insurance (Proair, or proventil or ventolin or generic albuterol inhaler)  Associated Diagnoses: Acute respiratory failure with hypoxia (H); Pneumonia of right lower lobe due to infectious organism; Aspiration of foreign body, subsequent encounter      amoxicillin-clavulanate (AUGMENTIN) 875-125 MG tablet Take 1 tablet by mouth every 12 hours for 6 days  Qty: 12 tablet, Refills: 0    Associated Diagnoses: Acute respiratory failure with hypoxia (H); Pneumonia of right lower lobe due to infectious organism; Aspiration of foreign body, subsequent encounter      guaiFENesin (MUCINEX) 600 MG 12 hr tablet Take 1 tablet (600 mg) by mouth 2 times daily  Qty: 10 tablet, Refills: 0    Associated Diagnoses: Acute respiratory failure with hypoxia (H); Pneumonia of right lower lobe due to infectious organism; Aspiration of foreign body, subsequent encounter      ipratropium - albuterol 0.5 mg/2.5 mg/3 mL (DUONEB) 0.5-2.5 (3) MG/3ML neb solution Take 1 vial (3 mLs) by nebulization every 6 hours as needed for shortness of breath / dyspnea or wheezing  Qty: 90 mL, Refills: 0    Associated Diagnoses: Acute respiratory failure with hypoxia (H); Pneumonia of right lower lobe due to infectious organism; Aspiration of foreign body, subsequent encounter      predniSONE (DELTASONE) 10 MG tablet Take 3 tablets (30 mg) by mouth daily for 3 days  Qty: 9 tablet, Refills: 0    Associated Diagnoses: Acute respiratory failure with hypoxia (H); Pneumonia of right lower lobe due to infectious organism; Aspiration of foreign body, subsequent encounter         STOP taking these medications       ibuprofen (ADVIL/MOTRIN) 200 MG tablet Comments:   Reason for Stopping:              Allergies   No Known Allergies

## 2022-10-04 NOTE — DISCHARGE INSTRUCTIONS
Post Hospital Follow Up Appointment:  Date/Time: Friday, October 7th, 2022 @ 9:40 AM  Provider: DONTAE Scott [facility]  [address] [place] [address]  Phone: [phone]  *Please call 24 hours before scheduled appointment if you albino to cancel or reschedule.

## 2022-10-05 NOTE — PROGRESS NOTES
"Clinic Care Coordination Contact  [facility]: Post-Discharge Note  SITUATION                                                      Admission:    Admission Date: 09/29/22   Reason for Admission: acute hypoxic respiratory failure.  Discharge:   Discharge Date: 10/04/22  Discharge Diagnosis: Acute hypoxic respiratory failure  Postobstructive pneumonia  Lung mass    BACKGROUND                                                      Per hospital discharge summary and inpatient provider notes:    Nguyen Olivier is a 77 year old woman with history of tobacco use disorder admitted on 9/29/2022 due to acute hypoxic respiratory failure.       ASSESSMENT           Discharge Assessment  How are you doing now that you are home?: \"not too good\". I wasn't able to figure out how to change from my nasal cannula to my mask overnight and I woke up with a heavy chest. My inahler helped but I was discharged with nebulizer medication but no machine\"  How are your symptoms? (Red Flag symptoms escalate to triage hotline per guidelines): Other (as above)  Do you feel your condition is stable enough to be safe at home until your provider visit?: Yes  Does the patient have their discharge instructions? : Yes (but isn't sure where son put them. Reviewed)  Does the patient have questions regarding their discharge instructions? : Yes (see comment) (needs nebulizer machine and portable oxygen)  Were you started on any new medications or were there changes to any of your previous medications? : Yes  Does the patient have all of their medications?: Yes  Do you have questions regarding any of your medications? : No  Do you have all of your needed medical supplies or equipment (DME)?  (i.e. oxygen tank, CPAP, cane, etc.): No - What equipment or supplies are needed? (as above)  Discharge follow-up appointment scheduled within 14 calendar days? : Yes  Discharge Follow Up Appointment Date: 10/12/22  Discharge Follow Up Appointment Scheduled " "with?: Primary Care Provider (new PCP)         Post-op (Clinicians Only)  Did the patient have surgery or a procedure: Yes (BRONCHOSCOPY WITH BRONCHOALVEOLAR LAVAGE & BRUSHINGS)  Incision:  (none)    Patient with cataracts and she notes she was not able to see/\"figure out\" how to change oxygen tubing from NC to mask. States she does not have nebulizer machine or portable oxygen. Wants portable oxygen due to appointments in the community. States friend who is retired RN just arrived and will help. Notes that home oxygen rep is coming this afternoon with portable oxygen. Advised to ask that person how to obtain nebulizer machine or alternatively can call her [facility] pharmacist to see if they are available there.     PLAN                                                      Outpatient Plan:  Follow up with primary care provider within 7 days for hospital follow-   up.  Follow-up lung biopsy results.  Continue Augmentin for the next 6   days.  Continue prednisone for the next 3 days.  Follow-up with   pulmonologist as arranged    Future Appointments   Date Time Provider Department Center   10/12/2022 10:20 AM Maggi Guillen PA-C [facility]         For any urgent concerns, please contact our 24 hour nurse triage line: [phone] ([phone])         Donita Ogden RN                "

## 2022-10-06 NOTE — TELEPHONE ENCOUNTER
Spoke with Nguyen about her non diagnostic BAL results. I let her know Dr. Johnson ordered a PET scan to look for other options to biopsy and that we will be calling her to schedule this. She is agreeable to this plan.     She is complaining of ongoing cough and is concerned that the medical supply company has not given her a nebulizer machine yet. She would like to purchase one directly from [employer] but they said she would need a prescription. I sent one to the [facility] in [place] at her request and told her to call and let us know if she is able to get it.       If you have worsening symptoms, questions, or need to speak with the nurse please call [phone].    Kely Dupont, CNP  Pulmonary Medicine  [facility]   [phone]

## 2022-10-06 NOTE — PROGRESS NOTES
Orders for nebulizer machine faxed to [facility] in [place] per request of patient.  Fax:  [phone]

## 2022-10-12 NOTE — PROGRESS NOTES
Assessment & Plan     ASSESSMENT/PLAN:      ICD-10-CM    1. Acute respiratory failure with hypoxia (H)  J96.01 XR Chest 2 Views     budesonide (PULMICORT) 0.5 MG/2ML neb solution     Overnight oximetry study      2. Pneumonia of right lower lobe due to infectious organism  J18.9 XR Chest 2 Views     doxycycline monohydrate (MONODOX) 100 MG capsule      3. Lung mass  R91.8 XR Chest 2 Views      4. Globus sensation  R09.89 Adult ENT  Referral        Discussed red flag signs to be seen urgently.  Patient has PET scan scheduled for tomorrow, awaiting pulmonology plans.  She was given new mask for nebulizer and spacer for inhaler.  She does not want to take prednisone as had itching/lip swelling, but discussed it is possible that is from the augmentin rather than prednisone. Will treat further antibiotics for pneumonia, and will add on inhaled steroid.  She wakes up with O2 88% - takes a few deep breaths and will increase to low 90s%. Recommended assessing for further oxygen desaturation at night.  Concern with globus sensation, ENT referral placed. PET scan tomorrow as well.    Patient Instructions   Overnight oximetry test: can call [employer] Phone: [phone]     ENT referral  - they will call you to schedule  For throat symptoms, may need a scope into the throat    Continue duoneb every 6 hours as needed  Start budesonide two times daily nebulizer (inhaled steroids)  Start doxycycline antibiotics x10 days    Follow up with pulmonology as recommended     52 minutes spent on the date of the encounter doing chart review, history and exam, documentation and further activities per the note    Return in about 1 week (around 10/19/2022) for if not improving or if worsening.    TERESA [facility]   Nguyen is a 77 year old accompanied by her son, presenting for the following health issues:  Establish Care (New patient) and ER F/U    HPI     Post Discharge  "Outreach 10/5/2022   Admission Date 9/29/2022   Reason for Admission acute hypoxic respiratory failure.   Discharge Date 10/4/2022   Discharge Diagnosis Acute hypoxic respiratory failure  Postobstructive pneumonia  Lung mass   How are you doing now that you are home? \"not too good\". I wasn't able to figure out how to change from my nasal cannula to my mask overnight and I woke up with a heavy chest. My inahler helped but I was discharged with nebulizer medication but no machine\"   How are your symptoms? (Red Flag symptoms escalate to triage hotline per guidelines) Other   Do you feel your condition is stable enough to be safe at home until your provider visit? Yes   Does the patient have their discharge instructions?  Yes   Does the patient have questions regarding their discharge instructions?  Yes (see comment)   Were you started on any new medications or were there changes to any of your previous medications?  Yes   Does the patient have all of their medications? Yes   Do you have questions regarding any of your medications?  No   Do you have all of your needed medical supplies or equipment (DME)?  (i.e. oxygen tank, CPAP, cane, etc.) No - What equipment or supplies are needed?   Discharge follow-up appointment scheduled within 14 calendar days?  Yes   Discharge Follow Up Appointment Date 10/12/2022   Discharge Follow Up Appointment Scheduled with? Primary Care Provider     *  States that when she was in the hospital things were going well, but when she was sent home with nebs she feels like it has not been helping and feels like she is getting worse.    Hospital Follow-up Visit:    Hospital/Nursing Home/IP Rehab Facility: [facility]  Date of Admission: 9/29/2022  Date of Discharge: 10/4/2022  Reason(s) for Admission: Acute respiratory failure    Was your hospitalization related to COVID-19? No   Problems taking medications regularly:  None  Medication changes since discharge: " None  Problems adhering to non-medication therapy:  None    Summary of hospitalization:  [facility] discharge summary reviewed  Diagnostic Tests/Treatments reviewed.  Follow up needed: PET scan tomorrow  Other Healthcare Providers Involved in Patient s Care:         Specialist appointment - has PET scheduled for tomorrow, waiting to hear when her appt with pulm will be  Update since discharge: fluctuating course.   Post Medication Reconciliation Status:        Plan of care communicated with patient and family     She is maintained on 4 L O2 supplemental, when she wakes up in the AM O2 Is 88% but will soon reach 91-92% after a few deep breaths.  Using nebs every 6 hours but not getting up at night. Waking up to go to the bathroom and breathing isn't good.  Normal appetite, not eating as much when she is making her own meals but eating meals people bring.  Feels like the neb and inhaler get stuck in her throat  Only coughing after she takes mucinex - thick phlegm, small amount. two times daily.  She was on prednisone in the hospital and got very itchy all over (primarily hands/feet) and had lip redness/swelling, kept her on it through the hospital. Does not want to restart due to this, did not  PRN from the pharmacy.  Diarrhea resolved after stopping antibiotics.    She lives in [address]+ living, alone in apartment. Does feel winded/sob walking short distances around apartment.  Has been feeling the last few days that something is stuck in her throat when she swallows or eats food. No concerns when drinking fluids.  No fevers. Yesterday after nebs had a few seconds of chills. Thinks from the window being open.      Review of Systems   Other than noted above, general, HEENT, respiratory, cardiac, MS, and gastrointestinal systems are negative.       Objective    /60   Pulse 102   Temp 99.8  F (37.7  C) (Tympanic)   Wt 65.3 kg (144 lb)   LMP  (LMP Unknown)   SpO2 94%   BMI 26.34 kg/m    Body  mass index is 26.34 kg/m .  Physical Exam   GENERAL: healthy, alert and no distress  HENT:  nose and mouth without ulcers or lesions POSITIVE bottom lip mucosal tissue irritated/red but no signs of infection no swelling  NECK: no adenopathy, no asymmetry, masses, or scars and thyroid normal to palpation POSITIVE patient felt internal pressure when I palpated neck generalized but no pain  RESP: POSITIVE scattered inspiratory/expiratory wheezes. Diminished breath sounds right lower/mid lung generalized but still air movement.  CV: regular rate and rhythm, normal S1 S2, no S3 or S4, no murmur, click or rub, no peripheral edema and peripheral pulses strong  ABDOMEN: soft, nontender, no hepatosplenomegaly, no masses and bowel sounds normal  MS: no gross musculoskeletal defects noted, no edema    Patient wearing supplemental O2 nasal cannula, speaking in full sentences but occasionally taking deep breaths if talking for longer, no other signs of cyanosis or respiratory distress.     Results for orders placed or performed in visit on 10/12/22   XR Chest 2 Views     Status: None    Narrative    XR CHEST 2 VIEWS  10/12/2022 10:57 AM       INDICATION: f/u pneumonia - worsening breathing since hospitalization  COMPARISON: 10/2/2022       Impression    IMPRESSION: Moderate right pleural effusion has decreased in size  compared to previous. There is persistent consolidation in the right  lower lobe. Interstitial opacities throughout the lungs, right greater  than left similar to previous and may represent additional pneumonitis  or edema. Heart size normal. No new findings.    MIGUEL MORALES MD         SYSTEM ID:  [id number]

## 2022-10-12 NOTE — PATIENT INSTRUCTIONS
Overnight oximetry test: can call [employer] Phone: [phone]     ENT referral  - they will call you to schedule  For throat symptoms, may need a scope into the throat    Continue duoneb every 6 hours as needed  Start budesonide two times daily nebulizer (inhaled steroids)  Start doxycycline antibiotics x10 days    Follow up with pulmonology as recommended

## 2022-10-15 NOTE — TELEPHONE ENCOUNTER
Called Nguyen to review PET scan results and next steps.   I was unable to reach her and left a VM with call back number to ICU where I am working this evening.     Will try her again tomorrow evening as well.      Monica Johnson MD  Pulmonary and Critical Care Medicine  [facility]  Office: [phone]

## 2022-10-16 NOTE — TELEPHONE ENCOUNTER
Called patient, able to reach her this evening to review PET scan results. We reviewed findings in general, and recommendation for supraclavicular lymph node biopsy. She would like to review results with her son(s) present on the phone as well before proceeding with anything. We tentatively planned to do this on Monday, 10/17 at noon. I will give her a call at that time.      Monica Johnson MD  Pulmonary and Critical Care Medicine  [facility]  Office: [phone]

## 2022-10-18 NOTE — TELEPHONE ENCOUNTER
FUTURE VISIT INFORMATION      FUTURE VISIT INFORMATION:    Date: 2/9/2023    Time: 8 AM     Location: [facility] [place] ENT   REFERRAL INFORMATION:    Referring provider:  Maggi Torres. TERESA     Referring providers clinic:   [facility]    Reason for visit/diagnosis  Globus sensation     RECORDS REQUESTED FROM:       Clinic name Comments Records Status Imaging Status   [facility]  10/12/2022 Office visit with Maggi Hauser PA-C     XR Chest: 10/12/2022 EPIC PACS   [facility]  9/29/2022 ED- Admission     XR Chest: 10/2/2022  XR Neck: 9/29/2022  CT Chest: 9/29/2022 Epic  PACS

## 2022-11-03 NOTE — TELEPHONE ENCOUNTER
Called patient and son Chuck and reviewed pathology results. Medical oncology referal placed.    Specimen A                 Interpretation:                  Positive for malignancy     RIGHT SUPRACLAVICULAR LYMPH NODE, ULTRASOUND-GUIDED FINE-NEEDLE BIOPSY:  -MALIGNANT; METASTATIC POORLY DIFFERENTIATED NON-SMALL CELL CARCINOMA OF THE LUNG  -PLEASE SEE COMMENT                 Adequacy:                 Satisfactory for evaluation        She has been having a week of increased SOB, and feels the BID budesonide nebs (sent on this after discharge?) are making her breathing worse.     Orders for QID duoneb, albuterol inhaler prn, and decadron taper (per notes she had itching with prednisone - this is not listed as an allergy, and per notes there was concern this was augmentin??). Ordered decadron out of caution. She will call if not feeling better.      Monica Johnson MD  Pulmonary and Critical Care Medicine  [facility]  Office: [phone]

## 2022-11-09 PROBLEM — T45.1X5A CHEMOTHERAPY-INDUCED NEUTROPENIA (H): Status: ACTIVE | Noted: 2022-01-01

## 2022-11-09 PROBLEM — C34.31 MALIGNANT NEOPLASM OF LOWER LOBE OF RIGHT LUNG (H): Status: ACTIVE | Noted: 2022-01-01

## 2022-11-09 PROBLEM — D70.1 CHEMOTHERAPY-INDUCED NEUTROPENIA (H): Status: ACTIVE | Noted: 2022-01-01

## 2022-11-09 NOTE — PROGRESS NOTES
[facility] Hematology and Oncology Consult Note      Patient: Nguyen Olivier  MRN: [id number]  Date of Service: Nov 9, 2022      We have been asked by Dr. Johnson to evaluate Nguyen Olivier for lung cancer    Assessment/Plan:    1.  Adenocarcinoma of the right lung: I met with the patient and her family.  We reviewed her recent right supraclavicular lymph node biopsy which showed poorly differentiated non-small cell carcinoma of the lung, suggesting adenocarcinoma.  I personally reviewed her PET scan images from October 13 and shared these with the family as well.  This shows lymph node involvement above and below the diaphragm.  There is also right pleural involvement.  Patient has stage IV lung cancer.  This was reviewed with her.  We talked about the implications of this, specifically that her cancer can be treated but the goal of the treatment of palliation and not cure.  She is symptomatic from her cancer and has previous airway obstruction.  I think she needs to start treatment quite soon.  We did send off for tissue for next generation sequencing to look for actionable mutations but these usually take a few weeks to return and I would like to start her on treatment prior to that.  As such, were going to use carboplatin/pemetrexed/pembrolizumab.  The rationale for this decision was explained to the patient and her son.  We talked about the scheduling of this regimen as well as some of the side effects.  These include, but are not limited to, hair loss, cytopenias, fatigue, neuropathy, nausea, and immune mediated toxicities.  We discussed Port-A-Cath placement and this will be ordered.  We will also going to try to get an MRI of the head.  She does have some claustrophobia so I will give her a prescription for Valium.  We will start with a 25% dose reduction of the carboplatin and the pemetrexed.  We will also get an MRI of the head.    2.  Dyspnea: Multifactorial secondary to significant  infiltrate/tumor in the right lung and bronchial obstruction.  She had budesonide nebs which did not seem to help.  4 times daily duo nebs with albuterol inhaler and Decadron taper over 12 days were ordered on November 3.  She does feel like this current regimen is helping.  Will not make any changes for now in her steroid taper.    [employer]  1    Staging History:     Cancer Staging   Malignant neoplasm of lower lobe of right lung (H)  Staging form: Lung, AJCC 8th Edition  - Clinical: Stage IVB (cT3, cN3, cM1c) - Unsigned      History:    Yas is a 77-year-old woman recently found to have a metastatic adenocarcinoma of the lung.  She presented to the emergency room on September 30, 2022 with shortness of breath.  She has CT of the chest performed which showed extensive consolidation and interstitial thickening throughout the right lung.  The airways of the right middle and right lower lobes were occluded.  There was also marked thickening of the airways of the right upper lobe.  A small right pleural effusion was present.  She had a bronchoscopy performed on October 1, 2022 which showed endobronchial masses at the takeoffs of both the right upper and right lower lobes, partially occlusive.  BAL and brushings were done.  Cytology from both was negative.  She then went on to have a PET scan ordered followed by a right supraclavicular lymph node biopsy which showed adenocarcinoma.  She presents today for further evaluation.  She states that her breathing is better than it was when she presented to the hospital.  Still has dyspnea on exertion.  Minimal cough.  No hemoptysis.  Denies chest pain.  No headaches or other neurologic symptoms.  Appetite is decreased.    Past History:    Past Medical History:   Diagnosis Date     Right Macular hole     No family history on file.   No blood dyscrasia history.   [unfilled] Social History     Socioeconomic History     Marital status:      Spouse name: Not on file  "    Number of children: Not on file     Years of education: Not on file     Highest education level: Not on file   Occupational History     Not on file   Tobacco Use     Smoking status: Former     Types: Cigarettes     Quit date:      Years since quittin.8     Smokeless tobacco: Never   Substance and Sexual Activity     Alcohol use: Not on file     Drug use: Never     Sexual activity: Not on file   Other Topics Concern     Not on file   Social History Narrative     Not on file     Social Determinants of Health     Financial Resource Strain: Not on file   Food Insecurity: Not on file   Transportation Needs: Not on file   Physical Activity: Not on file   Stress: Not on file   Social Connections: Not on file   Intimate Partner Violence: Not on file   Housing Stability: Not on file        Allergies:    No Known Allergies    Review of Systems:    As above in the history.     Review of Systems otherwise Negative for:  General: chills, fever or night sweats  Psychological: anxiety or depression  Ophthalmic: blurry vision, double vision or loss of vision, vision change  ENT: epistaxis, oral lesions, hearing changes  Hematological and Lymphatic: bleeding, bruising, jaundice, swollen lymph nodes  Endocrine: hot flashes, unexpected weight changes  Respiratory: cough, hemoptysis, orthopnea  Cardiovascular: chest pain, edema, palpitations or PND  Gastrointestinal: abdominal pain, blood in stools, change in bowel habits, constipation, diarrhea or nausea/vomiting  Genito-Urinary: change in urinary stream, incontinence, frequency/urgency  Musculoskeletal: joint pain, stiffness, swelling, muscle pain  Neurological: dizziness, headaches, numbness/tingling  Dermatological: lumps and rash    Physical Exam:    /58   Pulse 90   Temp 98.4  F (36.9  C)   Resp 20   Ht 1.6 m (5' 3\")   Wt 62.1 kg (136 lb 14.4 oz)   LMP  (LMP Unknown)   SpO2 93%   BMI 24.25 kg/m      General: patient appears stated age of 77 year old. " Nontoxic and in no distress.   HEENT: Head: atraumatic, normocephalic. Sclerae anicteric.  Chest:  Normal respiratory effort  Cardiac:  No edema.   Abdomen: abdomen is non-distended  Extremities: normal tone and muscle bulk.   Skin: no lesions or rash on visible skin. Warm and dry.   CNS: alert and oriented. Grossly non-focal.   Psychiatric: normal mood and affect.     Lab Results:    Recent Results (from the past 168 hour(s))   Comprehensive metabolic panel   Result Value Ref Range    Sodium 138 136 - 145 mmol/L    Potassium 4.0 3.4 - 5.3 mmol/L    Chloride 95 (L) 98 - 107 mmol/L    Carbon Dioxide (CO2) 30 (H) 22 - 29 mmol/L    Anion Gap 13 7 - 15 mmol/L    Urea Nitrogen 10.4 8.0 - 23.0 mg/dL    Creatinine 0.65 0.51 - 0.95 mg/dL    Calcium 9.3 8.8 - 10.2 mg/dL    Glucose 111 (H) 70 - 99 mg/dL    Alkaline Phosphatase 71 35 - 104 U/L    AST 50 (H) 10 - 35 U/L    ALT 36 (H) 10 - 35 U/L    Protein Total 6.4 6.4 - 8.3 g/dL    Albumin 3.6 3.5 - 5.2 g/dL    Bilirubin Total 0.4 <=1.2 mg/dL    GFR Estimate >90 >60 mL/min/1.73m2   CBC with platelets and differential   Result Value Ref Range    WBC Count 19.8 (H) 4.0 - 11.0 10e3/uL    RBC Count 5.16 3.80 - 5.20 10e6/uL    Hemoglobin 14.4 11.7 - 15.7 g/dL    Hematocrit 46.1 35.0 - 47.0 %    MCV 89 78 - 100 fL    MCH 27.9 26.5 - 33.0 pg    MCHC 31.2 (L) 31.5 - 36.5 g/dL    RDW 13.9 10.0 - 15.0 %    Platelet Count 199 150 - 450 10e3/uL    % Neutrophils 88 %    % Lymphocytes 3 %    % Monocytes 7 %    % Eosinophils 0 %    % Basophils 0 %    % Immature Granulocytes 2 %    NRBCs per 100 WBC 0 <1 /100    Absolute Neutrophils 17.7 (H) 1.6 - 8.3 10e3/uL    Absolute Lymphocytes 0.6 (L) 0.8 - 5.3 10e3/uL    Absolute Monocytes 1.4 (H) 0.0 - 1.3 10e3/uL    Absolute Eosinophils 0.0 0.0 - 0.7 10e3/uL    Absolute Basophils 0.1 0.0 - 0.2 10e3/uL    Absolute Immature Granulocytes 0.3 <=0.4 10e3/uL    Absolute NRBCs 0.0 10e3/uL     Imaging Results:    XR Chest 2 Views    Result Date:  10/12/2022  XR CHEST 2 VIEWS 10/12/2022 10:57 AM    INDICATION: f/u pneumonia - worsening breathing since hospitalization COMPARISON: 10/2/2022     IMPRESSION: Moderate right pleural effusion has decreased in size compared to previous. There is persistent consolidation in the right lower lobe. Interstitial opacities throughout the lungs, right greater than left similar to previous and may represent additional pneumonitis or edema. Heart size normal. No new findings. MIGUEL MORALES MD   SYSTEM ID:  [id number]    US Biopsy Lymph Node Core    Result Date: 10/28/2022  EXAM: 1. PERCUTANEOUS BIOPSY RIGHT SUPRACLAVICULAR LYMPH NODE 2. ULTRASOUND GUIDANCE LOCATION: [facility] DATE/TIME: 10/28/2022 3:48 PM INDICATION:  Lung mass and supraclavicular adenopathy. PROCEDURE: Informed consent obtained. Site marked. Prior images reviewed. Required items made available. Patient identity was confirmed verbally and with arm band. Patient reevaluated immediately before administering sedation. Universal protocol was followed. Time out performed. The site was prepped and draped in sterile fashion. 8 mL of 1 percent lidocaine was infused into the local soft tissues. Using standard technique and under direct ultrasound guidance, a 20 gauge biopsy needle was used to make five core biopsies. Tissue was submitted to Pathology. The patient tolerated the procedure well. No complications. SEDATION: No sedation given.     IMPRESSION: Status post US-guided biopsy right supraclavicular adenopathy. Reference CPT Code: 72840, 75981    PET Oncology Whole Body    Result Date: 10/13/2022  EXAM: PET ONCOLOGY WHOLE BODY LOCATION: [facility] DATE/TIME: 10/13/2022 11:07 AM INDICATION: Other nonspecific abnormal finding of lung field COMPARISON: CTA of the chest dated 09/29/2022 CONTRAST: None TECHNIQUE: Serum glucose level 94 mg/dL. One hour post intravenous administration of 10.2 mCi F-18 FDG, PET imaging  was performed from the skull vertex to feet, utilizing attenuation correction with concurrent axial CT and PET/CT image fusion. Dose reduction techniques were used. PET/CT FINDINGS: Infiltrative FDG avid airspace opacity in the right lower lobe (max SUV 22.6) that extends into the right middle (max SUV 9.2) and proximal right upper lobe (max SUV 10.6) with FDG avid right greater than left interlobar station 11 (max SUV 17.8), subcarinal station 7 (max SUV 25.1) left greater than right lower paratracheal station 4 (max SUV 14.1), subaortic/periaortic station 5/6 (max SUV 11.6), prevascular station 3A (max SUV 16.7), left upper paratracheal station 2R (max SUV 10.7),  left greater than right supraclavicular (max SUV 17.8), paraesophageal station 8 (max SUV 10.5), right internal mammary (max SUV 5.9) right retrocrural (max SUV 8.3), gastrohepatic (max SUV 19.5) and bilateral retroperitoneal (max SUV 13.2) lymph nodes with FDG avid thickening in the right posterior pleura (max SUV 7.4) suspicious for infiltrative lung neoplasm with metastases involving lymph nodes above and below the diaphragm and right pleura. The remaining FDG uptake is physiologic from the skull vertex to feet CT FINDINGS: Mild senescent intracranial changes. Mild paranasal sinus because of thickening. Moderate coronary artery calcium. Loculated right pleural effusion. Trace pericardial effusion. Left upper quadrant splenule. Infrarenal abdominal aortic aneurysm measuring up to 32 mm. Sigmoid diverticulosis. Pelvic phleboliths. Multilevel degenerative changes of the spine. The lower extremities are unremarkable.     IMPRESSION: Findings suspicious for infiltrative lung neoplasm with metastases involving lymph nodes above and below the diaphragm and right pleura.      Signed by: Dmitriy Gutierres MD

## 2022-11-09 NOTE — PROGRESS NOTES
"Oncology Rooming Note    November 9, 2022 1:13 PM   Nguyen Olivier is a 77 year old female who presents for:    Chief Complaint   Patient presents with     Oncology Clinic Visit     Malignant neoplasm of lower lobe of right lung      Initial Vitals: /58   Pulse 90   Temp 98.4  F (36.9  C)   Resp 20   Ht 1.6 m (5' 3\")   Wt 62.1 kg (136 lb 14.4 oz)   LMP  (LMP Unknown)   SpO2 93%   BMI 24.25 kg/m   Estimated body mass index is 24.25 kg/m  as calculated from the following:    Height as of this encounter: 1.6 m (5' 3\").    Weight as of this encounter: 62.1 kg (136 lb 14.4 oz). Body surface area is 1.66 meters squared.  No Pain (0) Comment: Data Unavailable   No LMP recorded (lmp unknown). Patient is postmenopausal.  Allergies reviewed: Yes  Medications reviewed: Yes    Medications: Medication refills not needed today.  Pharmacy name entered into [facility]:    [facility] [place], [place] - [address] [place] [address]/PHARMACY [id number] [place] [id number]    Clinical concerns: Having trouble breathing lately after starting the dexamethasone.       Yane Baumann LPN            "

## 2022-11-09 NOTE — LETTER
11/9/2022         RE: Nguyen Olivier  [address]  [facility] [address]        Dear Colleague,    Thank you for referring your patient, Nguyen Olivier, to the [facility]. Please see a copy of my visit note below.    [facility] Hematology and Oncology Consult Note      Patient: Nguyen Olivier  MRN: [id number]  Date of Service: Nov 9, 2022      We have been asked by Dr. Johnson to evaluate Nguyen Olivier for lung cancer    Assessment/Plan:    1.  Adenocarcinoma of the right lung: I met with the patient and her family.  We reviewed her recent right supraclavicular lymph node biopsy which showed poorly differentiated non-small cell carcinoma of the lung, suggesting adenocarcinoma.  I personally reviewed her PET scan images from October 13 and shared these with the family as well.  This shows lymph node involvement above and below the diaphragm.  There is also right pleural involvement.      2.  Dyspnea: Multifactorial secondary to significant infiltrate/tumor in the right lung and bronchial obstruction.  She had budesonide nebs which did not seem to help.  4 times daily duo nebs with albuterol inhaler and Decadron taper over 12 days were ordered on November 3.      ECOG Performance      Staging History:    Cancer Staging   No matching staging information was found for the patient.    History:    Yas is a 77-year-old woman recently found to have a metastatic adenocarcinoma of the lung.  She presented to the emergency room on September 30, 2022 with shortness of breath.  She has CT of the chest performed which showed extensive consolidation and interstitial thickening throughout the right lung.  The airways of the right middle and right lower lobes were occluded.  There was also marked thickening of the airways of the right upper lobe.  A small right pleural effusion was present.  She had a bronchoscopy performed on October 1, 2022  which showed endobronchial masses at the takeoffs of both the right upper and right lower lobes, partially occlusive.  BAL and brushings were done.  Cytology from both was negative.  She then went on to have a PET scan ordered followed by a right supraclavicular lymph node biopsy which showed adenocarcinoma.  She presents today for further evaluation.    Past History:    Past Medical History:   Diagnosis Date     Right Macular hole     No family history on file.   [unfilled] Social History     Socioeconomic History     Marital status:      Spouse name: Not on file     Number of children: Not on file     Years of education: Not on file     Highest education level: Not on file   Occupational History     Not on file   Tobacco Use     Smoking status: Former     Types: Cigarettes     Quit date:      Years since quittin.8     Smokeless tobacco: Never   Substance and Sexual Activity     Alcohol use: Not on file     Drug use: Never     Sexual activity: Not on file   Other Topics Concern     Not on file   Social History Narrative     Not on file     Social Determinants of Health     Financial Resource Strain: Not on file   Food Insecurity: Not on file   Transportation Needs: Not on file   Physical Activity: Not on file   Stress: Not on file   Social Connections: Not on file   Intimate Partner Violence: Not on file   Housing Stability: Not on file        Allergies:    No Known Allergies    Review of Systems:    As above in the history.     Review of Systems otherwise Negative for:  General: chills, fever or night sweats  Psychological: anxiety or depression  Ophthalmic: blurry vision, double vision or loss of vision, vision change  ENT: epistaxis, oral lesions, hearing changes  Hematological and Lymphatic: bleeding, bruising, jaundice, swollen lymph nodes  Endocrine: hot flashes, unexpected weight changes  Respiratory: cough, hemoptysis, orthopnea or shortness of breath/CARLOS  Cardiovascular: chest pain, edema,  palpitations or PND  Gastrointestinal: abdominal pain, blood in stools, change in bowel habits, constipation, diarrhea or nausea/vomiting  Genito-Urinary: change in urinary stream, incontinence, frequency/urgency  Musculoskeletal: joint pain, stiffness, swelling, muscle pain  Neurological: dizziness, headaches, numbness/tingling  Dermatological: lumps and rash    Pain       Physical Exam:    LMP  (LMP Unknown)       General: patient appears stated age of 77 year old. Nontoxic and in no distress.   HEENT: Head: atraumatic, normocephalic. Sclerae anicteric.  Chest:  Normal respiratory effort  Cardiac:  No edema.   Abdomen: abdomen is non-distended  Extremities: normal tone and muscle bulk.   Skin: no lesions or rash on visible skin. Warm and dry.   CNS: alert and oriented. Grossly non-focal.   Psychiatric: normal mood and affect.     Lab Results:    No results found for this or any previous visit (from the past 168 hour(s)).    Imaging Results:    XR Chest 2 Views    Result Date: 10/12/2022  XR CHEST 2 VIEWS 10/12/2022 10:57 AM    INDICATION: f/u pneumonia - worsening breathing since hospitalization COMPARISON: 10/2/2022     IMPRESSION: Moderate right pleural effusion has decreased in size compared to previous. There is persistent consolidation in the right lower lobe. Interstitial opacities throughout the lungs, right greater than left similar to previous and may represent additional pneumonitis or edema. Heart size normal. No new findings. MIGUEL MORALES MD   SYSTEM ID:  [id number]    US Biopsy Lymph Node Core    Result Date: 10/28/2022  EXAM: 1. PERCUTANEOUS BIOPSY RIGHT SUPRACLAVICULAR LYMPH NODE 2. ULTRASOUND GUIDANCE LOCATION: [facility] DATE/TIME: 10/28/2022 3:48 PM INDICATION:  Lung mass and supraclavicular adenopathy. PROCEDURE: Informed consent obtained. Site marked. Prior images reviewed. Required items made available. Patient identity was confirmed verbally and with arm band.  Patient reevaluated immediately before administering sedation. Universal protocol was followed. Time out performed. The site was prepped and draped in sterile fashion. 8 mL of 1 percent lidocaine was infused into the local soft tissues. Using standard technique and under direct ultrasound guidance, a 20 gauge biopsy needle was used to make five core biopsies. Tissue was submitted to Pathology. The patient tolerated the procedure well. No complications. SEDATION: No sedation given.     IMPRESSION: Status post US-guided biopsy right supraclavicular adenopathy. Reference CPT Code: 71482, 67294    PET Oncology Whole Body    Result Date: 10/13/2022  EXAM: PET ONCOLOGY WHOLE BODY LOCATION: [facility] DATE/TIME: 10/13/2022 11:07 AM INDICATION: Other nonspecific abnormal finding of lung field COMPARISON: CTA of the chest dated 09/29/2022 CONTRAST: None TECHNIQUE: Serum glucose level 94 mg/dL. One hour post intravenous administration of 10.2 mCi F-18 FDG, PET imaging was performed from the skull vertex to feet, utilizing attenuation correction with concurrent axial CT and PET/CT image fusion. Dose reduction techniques were used. PET/CT FINDINGS: Infiltrative FDG avid airspace opacity in the right lower lobe (max SUV 22.6) that extends into the right middle (max SUV 9.2) and proximal right upper lobe (max SUV 10.6) with FDG avid right greater than left interlobar station 11 (max SUV 17.8), subcarinal station 7 (max SUV 25.1) left greater than right lower paratracheal station 4 (max SUV 14.1), subaortic/periaortic station 5/6 (max SUV 11.6), prevascular station 3A (max SUV 16.7), left upper paratracheal station 2R (max SUV 10.7),  left greater than right supraclavicular (max SUV 17.8), paraesophageal station 8 (max SUV 10.5), right internal mammary (max SUV 5.9) right retrocrural (max SUV 8.3), gastrohepatic (max SUV 19.5) and bilateral retroperitoneal (max SUV 13.2) lymph nodes with FDG avid  "thickening in the right posterior pleura (max SUV 7.4) suspicious for infiltrative lung neoplasm with metastases involving lymph nodes above and below the diaphragm and right pleura. The remaining FDG uptake is physiologic from the skull vertex to feet CT FINDINGS: Mild senescent intracranial changes. Mild paranasal sinus because of thickening. Moderate coronary artery calcium. Loculated right pleural effusion. Trace pericardial effusion. Left upper quadrant splenule. Infrarenal abdominal aortic aneurysm measuring up to 32 mm. Sigmoid diverticulosis. Pelvic phleboliths. Multilevel degenerative changes of the spine. The lower extremities are unremarkable.     IMPRESSION: Findings suspicious for infiltrative lung neoplasm with metastases involving lymph nodes above and below the diaphragm and right pleura.      Pathology:    Signed by: Dmitriy Gutierres MD      Oncology Rooming Note    November 9, 2022 1:13 PM   Nguyen Olivier is a 77 year old female who presents for:    Chief Complaint   Patient presents with     Oncology Clinic Visit     Malignant neoplasm of lower lobe of right lung      Initial Vitals: /58   Pulse 90   Temp 98.4  F (36.9  C)   Resp 20   Ht 1.6 m (5' 3\")   Wt 62.1 kg (136 lb 14.4 oz)   LMP  (LMP Unknown)   SpO2 93%   BMI 24.25 kg/m   Estimated body mass index is 24.25 kg/m  as calculated from the following:    Height as of this encounter: 1.6 m (5' 3\").    Weight as of this encounter: 62.1 kg (136 lb 14.4 oz). Body surface area is 1.66 meters squared.  No Pain (0) Comment: Data Unavailable   No LMP recorded (lmp unknown). Patient is postmenopausal.  Allergies reviewed: Yes  Medications reviewed: Yes    Medications: Medication refills not needed today.  Pharmacy name entered into [email or web address]:    [facility] [place], [place] - [address]/[facility]ACY #[id number] - [place] [id number]    Clinical concerns: Having trouble breathing lately after starting " the dexamethasone.       Yane Baumann LPN              [facility] Hematology and Oncology Consult Note      Patient: Nguyen Olivier  MRN: [id number]  Date of Service: Nov 9, 2022      We have been asked by Dr. Johnson to evaluate Nguyen Olivier for lung cancer    Assessment/Plan:    1.  Adenocarcinoma of the right lung: I met with the patient and her family.  We reviewed her recent right supraclavicular lymph node biopsy which showed poorly differentiated non-small cell carcinoma of the lung, suggesting adenocarcinoma.  I personally reviewed her PET scan images from October 13 and shared these with the family as well.  This shows lymph node involvement above and below the diaphragm.  There is also right pleural involvement.  Patient has stage IV lung cancer.  This was reviewed with her.  We talked about the implications of this, specifically that her cancer can be treated but the goal of the treatment of palliation and not cure.  She is symptomatic from her cancer and has previous airway obstruction.  I think she needs to start treatment quite soon.  We did send off for tissue for next generation sequencing to look for actionable mutations but these usually take a few weeks to return and I would like to start her on treatment prior to that.  As such, were going to use carboplatin/pemetrexed/pembrolizumab.  The rationale for this decision was explained to the patient and her son.  We talked about the scheduling of this regimen as well as some of the side effects.  These include, but are not limited to, hair loss, cytopenias, fatigue, neuropathy, nausea, and immune mediated toxicities.  We discussed Port-A-Cath placement and this will be ordered.  We will also going to try to get an MRI of the head.  She does have some claustrophobia so I will give her a prescription for Valium.    MRI head    2.  Dyspnea: Multifactorial secondary to significant infiltrate/tumor in the right lung and  bronchial obstruction.  She had budesonide nebs which did not seem to help.  4 times daily duo nebs with albuterol inhaler and Decadron taper over 12 days were ordered on November 3.  She does feel like this current regimen is helping.  Will not make any changes for now in her steroid taper.    ECOG Performance  1    Staging History:     Cancer Staging   Malignant neoplasm of lower lobe of right lung (H)  Staging form: Lung, AJCC 8th Edition  - Clinical: Stage IVB (cT3, cN3, cM1c) - Unsigned      History:    Yas is a 77-year-old woman recently found to have a metastatic adenocarcinoma of the lung.  She presented to the emergency room on September 30, 2022 with shortness of breath.  She has CT of the chest performed which showed extensive consolidation and interstitial thickening throughout the right lung.  The airways of the right middle and right lower lobes were occluded.  There was also marked thickening of the airways of the right upper lobe.  A small right pleural effusion was present.  She had a bronchoscopy performed on October 1, 2022 which showed endobronchial masses at the takeoffs of both the right upper and right lower lobes, partially occlusive.  BAL and brushings were done.  Cytology from both was negative.  She then went on to have a PET scan ordered followed by a right supraclavicular lymph node biopsy which showed adenocarcinoma.  She presents today for further evaluation.  She states that her breathing is better than it was when she presented to the hospital.  Still has dyspnea on exertion.  Minimal cough.  No hemoptysis.  Denies chest pain.  No headaches or other neurologic symptoms.  Appetite is decreased.    Past History:    Past Medical History:   Diagnosis Date     Right Macular hole     No family history on file.   No blood dyscrasia history.   [unfilled] Social History     Socioeconomic History     Marital status:      Spouse name: Not on file     Number of children: Not on file      "Years of education: Not on file     Highest education level: Not on file   Occupational History     Not on file   Tobacco Use     Smoking status: Former     Types: Cigarettes     Quit date:      Years since quittin.8     Smokeless tobacco: Never   Substance and Sexual Activity     Alcohol use: Not on file     Drug use: Never     Sexual activity: Not on file   Other Topics Concern     Not on file   Social History Narrative     Not on file     Social Determinants of Health     Financial Resource Strain: Not on file   Food Insecurity: Not on file   Transportation Needs: Not on file   Physical Activity: Not on file   Stress: Not on file   Social Connections: Not on file   Intimate Partner Violence: Not on file   Housing Stability: Not on file        Allergies:    No Known Allergies    Review of Systems:    As above in the history.     Review of Systems otherwise Negative for:  General: chills, fever or night sweats  Psychological: anxiety or depression  Ophthalmic: blurry vision, double vision or loss of vision, vision change  ENT: epistaxis, oral lesions, hearing changes  Hematological and Lymphatic: bleeding, bruising, jaundice, swollen lymph nodes  Endocrine: hot flashes, unexpected weight changes  Respiratory: cough, hemoptysis, orthopnea  Cardiovascular: chest pain, edema, palpitations or PND  Gastrointestinal: abdominal pain, blood in stools, change in bowel habits, constipation, diarrhea or nausea/vomiting  Genito-Urinary: change in urinary stream, incontinence, frequency/urgency  Musculoskeletal: joint pain, stiffness, swelling, muscle pain  Neurological: dizziness, headaches, numbness/tingling  Dermatological: lumps and rash    Physical Exam:    /58   Pulse 90   Temp 98.4  F (36.9  C)   Resp 20   Ht 1.6 m (5' 3\")   Wt 62.1 kg (136 lb 14.4 oz)   LMP  (LMP Unknown)   SpO2 93%   BMI 24.25 kg/m      General: patient appears stated age of 77 year old. Nontoxic and in no distress.   HEENT: Head: " atraumatic, normocephalic. Sclerae anicteric.  Chest:  Normal respiratory effort  Cardiac:  No edema.   Abdomen: abdomen is non-distended  Extremities: normal tone and muscle bulk.   Skin: no lesions or rash on visible skin. Warm and dry.   CNS: alert and oriented. Grossly non-focal.   Psychiatric: normal mood and affect.     Lab Results:    Recent Results (from the past 168 hour(s))   Comprehensive metabolic panel   Result Value Ref Range    Sodium 138 136 - 145 mmol/L    Potassium 4.0 3.4 - 5.3 mmol/L    Chloride 95 (L) 98 - 107 mmol/L    Carbon Dioxide (CO2) 30 (H) 22 - 29 mmol/L    Anion Gap 13 7 - 15 mmol/L    Urea Nitrogen 10.4 8.0 - 23.0 mg/dL    Creatinine 0.65 0.51 - 0.95 mg/dL    Calcium 9.3 8.8 - 10.2 mg/dL    Glucose 111 (H) 70 - 99 mg/dL    Alkaline Phosphatase 71 35 - 104 U/L    AST 50 (H) 10 - 35 U/L    ALT 36 (H) 10 - 35 U/L    Protein Total 6.4 6.4 - 8.3 g/dL    Albumin 3.6 3.5 - 5.2 g/dL    Bilirubin Total 0.4 <=1.2 mg/dL    GFR Estimate >90 >60 mL/min/1.73m2   CBC with platelets and differential   Result Value Ref Range    WBC Count 19.8 (H) 4.0 - 11.0 10e3/uL    RBC Count 5.16 3.80 - 5.20 10e6/uL    Hemoglobin 14.4 11.7 - 15.7 g/dL    Hematocrit 46.1 35.0 - 47.0 %    MCV 89 78 - 100 fL    MCH 27.9 26.5 - 33.0 pg    MCHC 31.2 (L) 31.5 - 36.5 g/dL    RDW 13.9 10.0 - 15.0 %    Platelet Count 199 150 - 450 10e3/uL    % Neutrophils 88 %    % Lymphocytes 3 %    % Monocytes 7 %    % Eosinophils 0 %    % Basophils 0 %    % Immature Granulocytes 2 %    NRBCs per 100 WBC 0 <1 /100    Absolute Neutrophils 17.7 (H) 1.6 - 8.3 10e3/uL    Absolute Lymphocytes 0.6 (L) 0.8 - 5.3 10e3/uL    Absolute Monocytes 1.4 (H) 0.0 - 1.3 10e3/uL    Absolute Eosinophils 0.0 0.0 - 0.7 10e3/uL    Absolute Basophils 0.1 0.0 - 0.2 10e3/uL    Absolute Immature Granulocytes 0.3 <=0.4 10e3/uL    Absolute NRBCs 0.0 10e3/uL     Imaging Results:    XR Chest 2 Views    Result Date: 10/12/2022  XR CHEST 2 VIEWS 10/12/2022 10:57 AM     INDICATION: f/u pneumonia - worsening breathing since hospitalization COMPARISON: 10/2/2022     IMPRESSION: Moderate right pleural effusion has decreased in size compared to previous. There is persistent consolidation in the right lower lobe. Interstitial opacities throughout the lungs, right greater than left similar to previous and may represent additional pneumonitis or edema. Heart size normal. No new findings. MIGUEL MORALES MD   SYSTEM ID:  [id number]    US Biopsy Lymph Node Core    Result Date: 10/28/2022  EXAM: 1. PERCUTANEOUS BIOPSY RIGHT SUPRACLAVICULAR LYMPH NODE 2. ULTRASOUND GUIDANCE LOCATION: [facility] DATE/TIME: 10/28/2022 3:48 PM INDICATION:  Lung mass and supraclavicular adenopathy. PROCEDURE: Informed consent obtained. Site marked. Prior images reviewed. Required items made available. Patient identity was confirmed verbally and with arm band. Patient reevaluated immediately before administering sedation. Universal protocol was followed. Time out performed. The site was prepped and draped in sterile fashion. 8 mL of 1 percent lidocaine was infused into the local soft tissues. Using standard technique and under direct ultrasound guidance, a 20 gauge biopsy needle was used to make five core biopsies. Tissue was submitted to Pathology. The patient tolerated the procedure well. No complications. SEDATION: No sedation given.     IMPRESSION: Status post US-guided biopsy right supraclavicular adenopathy. Reference CPT Code: 96790, 63886    PET Oncology Whole Body    Result Date: 10/13/2022  EXAM: PET ONCOLOGY WHOLE BODY LOCATION: [facility] DATE/TIME: 10/13/2022 11:07 AM INDICATION: Other nonspecific abnormal finding of lung field COMPARISON: CTA of the chest dated 09/29/2022 CONTRAST: None TECHNIQUE: Serum glucose level 94 mg/dL. One hour post intravenous administration of 10.2 mCi F-18 FDG, PET imaging was performed from the skull vertex to feet,  utilizing attenuation correction with concurrent axial CT and PET/CT image fusion. Dose reduction techniques were used. PET/CT FINDINGS: Infiltrative FDG avid airspace opacity in the right lower lobe (max SUV 22.6) that extends into the right middle (max SUV 9.2) and proximal right upper lobe (max SUV 10.6) with FDG avid right greater than left interlobar station 11 (max SUV 17.8), subcarinal station 7 (max SUV 25.1) left greater than right lower paratracheal station 4 (max SUV 14.1), subaortic/periaortic station 5/6 (max SUV 11.6), prevascular station 3A (max SUV 16.7), left upper paratracheal station 2R (max SUV 10.7),  left greater than right supraclavicular (max SUV 17.8), paraesophageal station 8 (max SUV 10.5), right internal mammary (max SUV 5.9) right retrocrural (max SUV 8.3), gastrohepatic (max SUV 19.5) and bilateral retroperitoneal (max SUV 13.2) lymph nodes with FDG avid thickening in the right posterior pleura (max SUV 7.4) suspicious for infiltrative lung neoplasm with metastases involving lymph nodes above and below the diaphragm and right pleura. The remaining FDG uptake is physiologic from the skull vertex to feet CT FINDINGS: Mild senescent intracranial changes. Mild paranasal sinus because of thickening. Moderate coronary artery calcium. Loculated right pleural effusion. Trace pericardial effusion. Left upper quadrant splenule. Infrarenal abdominal aortic aneurysm measuring up to 32 mm. Sigmoid diverticulosis. Pelvic phleboliths. Multilevel degenerative changes of the spine. The lower extremities are unremarkable.     IMPRESSION: Findings suspicious for infiltrative lung neoplasm with metastases involving lymph nodes above and below the diaphragm and right pleura.      Signed by: Dmitriy Gutierres MD        Again, thank you for allowing me to participate in the care of your patient.        Sincerely,        Dmitriy Gutierres MD

## 2022-11-09 NOTE — PROGRESS NOTES
[facility] Hematology and Oncology Consult Note      Patient: Nguyen Olivier  MRN: [id number]  Date of Service: Nov 9, 2022      We have been asked by Dr. Johnson to evaluate Nguyen Olivier for lung cancer    Assessment/Plan:    1.  Adenocarcinoma of the right lung: I met with the patient and her family.  We reviewed her recent right supraclavicular lymph node biopsy which showed poorly differentiated non-small cell carcinoma of the lung, suggesting adenocarcinoma.  I personally reviewed her PET scan images from October 13 and shared these with the family as well.  This shows lymph node involvement above and below the diaphragm.  There is also right pleural involvement.      2.  Dyspnea: Multifactorial secondary to significant infiltrate/tumor in the right lung and bronchial obstruction.  She had budesonide nebs which did not seem to help.  4 times daily duo nebs with albuterol inhaler and Decadron taper over 12 days were ordered on November 3.      ECOG Performance      Staging History:    Cancer Staging   No matching staging information was found for the patient.    History:    Yas is a 77-year-old woman recently found to have a metastatic adenocarcinoma of the lung.  She presented to the emergency room on September 30, 2022 with shortness of breath.  She has CT of the chest performed which showed extensive consolidation and interstitial thickening throughout the right lung.  The airways of the right middle and right lower lobes were occluded.  There was also marked thickening of the airways of the right upper lobe.  A small right pleural effusion was present.  She had a bronchoscopy performed on October 1, 2022 which showed endobronchial masses at the takeoffs of both the right upper and right lower lobes, partially occlusive.  BAL and brushings were done.  Cytology from both was negative.  She then went on to have a PET scan ordered followed by a right supraclavicular lymph node biopsy  which showed adenocarcinoma.  She presents today for further evaluation.    Past History:    Past Medical History:   Diagnosis Date     Right Macular hole     No family history on file.   [unfilled] Social History     Socioeconomic History     Marital status:      Spouse name: Not on file     Number of children: Not on file     Years of education: Not on file     Highest education level: Not on file   Occupational History     Not on file   Tobacco Use     Smoking status: Former     Types: Cigarettes     Quit date:      Years since quittin.8     Smokeless tobacco: Never   Substance and Sexual Activity     Alcohol use: Not on file     Drug use: Never     Sexual activity: Not on file   Other Topics Concern     Not on file   Social History Narrative     Not on file     Social Determinants of Health     Financial Resource Strain: Not on file   Food Insecurity: Not on file   Transportation Needs: Not on file   Physical Activity: Not on file   Stress: Not on file   Social Connections: Not on file   Intimate Partner Violence: Not on file   Housing Stability: Not on file        Allergies:    No Known Allergies    Review of Systems:    As above in the history.     Review of Systems otherwise Negative for:  General: chills, fever or night sweats  Psychological: anxiety or depression  Ophthalmic: blurry vision, double vision or loss of vision, vision change  ENT: epistaxis, oral lesions, hearing changes  Hematological and Lymphatic: bleeding, bruising, jaundice, swollen lymph nodes  Endocrine: hot flashes, unexpected weight changes  Respiratory: cough, hemoptysis, orthopnea or shortness of breath/CARLOS  Cardiovascular: chest pain, edema, palpitations or PND  Gastrointestinal: abdominal pain, blood in stools, change in bowel habits, constipation, diarrhea or nausea/vomiting  Genito-Urinary: change in urinary stream, incontinence, frequency/urgency  Musculoskeletal: joint pain, stiffness, swelling, muscle  pain  Neurological: dizziness, headaches, numbness/tingling  Dermatological: lumps and rash    Pain       Physical Exam:    LMP  (LMP Unknown)       General: patient appears stated age of 77 year old. Nontoxic and in no distress.   HEENT: Head: atraumatic, normocephalic. Sclerae anicteric.  Chest:  Normal respiratory effort  Cardiac:  No edema.   Abdomen: abdomen is non-distended  Extremities: normal tone and muscle bulk.   Skin: no lesions or rash on visible skin. Warm and dry.   CNS: alert and oriented. Grossly non-focal.   Psychiatric: normal mood and affect.     Lab Results:    No results found for this or any previous visit (from the past 168 hour(s)).    Imaging Results:    XR Chest 2 Views    Result Date: 10/12/2022  XR CHEST 2 VIEWS 10/12/2022 10:57 AM    INDICATION: f/u pneumonia - worsening breathing since hospitalization COMPARISON: 10/2/2022     IMPRESSION: Moderate right pleural effusion has decreased in size compared to previous. There is persistent consolidation in the right lower lobe. Interstitial opacities throughout the lungs, right greater than left similar to previous and may represent additional pneumonitis or edema. Heart size normal. No new findings. MIGUEL MORALES MD   SYSTEM ID:  [id number]    US Biopsy Lymph Node Core    Result Date: 10/28/2022  EXAM: 1. PERCUTANEOUS BIOPSY RIGHT SUPRACLAVICULAR LYMPH NODE 2. ULTRASOUND GUIDANCE LOCATION: [facility] DATE/TIME: 10/28/2022 3:48 PM INDICATION:  Lung mass and supraclavicular adenopathy. PROCEDURE: Informed consent obtained. Site marked. Prior images reviewed. Required items made available. Patient identity was confirmed verbally and with arm band. Patient reevaluated immediately before administering sedation. Universal protocol was followed. Time out performed. The site was prepped and draped in sterile fashion. 8 mL of 1 percent lidocaine was infused into the local soft tissues. Using standard technique and under  direct ultrasound guidance, a 20 gauge biopsy needle was used to make five core biopsies. Tissue was submitted to Pathology. The patient tolerated the procedure well. No complications. SEDATION: No sedation given.     IMPRESSION: Status post US-guided biopsy right supraclavicular adenopathy. Reference CPT Code: 54285, 80927    PET Oncology Whole Body    Result Date: 10/13/2022  EXAM: PET ONCOLOGY WHOLE BODY LOCATION: [facility] DATE/TIME: 10/13/2022 11:07 AM INDICATION: Other nonspecific abnormal finding of lung field COMPARISON: CTA of the chest dated 09/29/2022 CONTRAST: None TECHNIQUE: Serum glucose level 94 mg/dL. One hour post intravenous administration of 10.2 mCi F-18 FDG, PET imaging was performed from the skull vertex to feet, utilizing attenuation correction with concurrent axial CT and PET/CT image fusion. Dose reduction techniques were used. PET/CT FINDINGS: Infiltrative FDG avid airspace opacity in the right lower lobe (max SUV 22.6) that extends into the right middle (max SUV 9.2) and proximal right upper lobe (max SUV 10.6) with FDG avid right greater than left interlobar station 11 (max SUV 17.8), subcarinal station 7 (max SUV 25.1) left greater than right lower paratracheal station 4 (max SUV 14.1), subaortic/periaortic station 5/6 (max SUV 11.6), prevascular station 3A (max SUV 16.7), left upper paratracheal station 2R (max SUV 10.7),  left greater than right supraclavicular (max SUV 17.8), paraesophageal station 8 (max SUV 10.5), right internal mammary (max SUV 5.9) right retrocrural (max SUV 8.3), gastrohepatic (max SUV 19.5) and bilateral retroperitoneal (max SUV 13.2) lymph nodes with FDG avid thickening in the right posterior pleura (max SUV 7.4) suspicious for infiltrative lung neoplasm with metastases involving lymph nodes above and below the diaphragm and right pleura. The remaining FDG uptake is physiologic from the skull vertex to feet CT FINDINGS: Mild senescent  intracranial changes. Mild paranasal sinus because of thickening. Moderate coronary artery calcium. Loculated right pleural effusion. Trace pericardial effusion. Left upper quadrant splenule. Infrarenal abdominal aortic aneurysm measuring up to 32 mm. Sigmoid diverticulosis. Pelvic phleboliths. Multilevel degenerative changes of the spine. The lower extremities are unremarkable.     IMPRESSION: Findings suspicious for infiltrative lung neoplasm with metastases involving lymph nodes above and below the diaphragm and right pleura.      Pathology:    Signed by: Dmitriy Gutierres MD

## 2022-11-11 NOTE — H&P
Interventional Radiology - Pre-Procedure Note:  11/11/2022    Procedure Requested: Port placement  Requested by: Dmitriy Gutierres MD    History and Physical Reviewed: H&P documented within 30 days (by Dmitriy Gutierres MD on 11/9/22).  I have personally reviewed the patient's medical history and have updated the medical record as necessary.    Brief HPI: Nguyen Olivier is a 77 year old female with Adenocarcinoma of the right lung. Presenting for port placement for palliative chemotherapy.      NPO: yes  ANTICOAGULANTS: none  ANTIBIOTICS: Ancef 2gm    ALLERGIES  No Known Allergies      LABS:  INR   Date Value Ref Range Status   09/29/2022 1.05 0.85 - 1.15 Final      Hemoglobin   Date Value Ref Range Status   11/09/2022 14.4 11.7 - 15.7 g/dL Final   ]  Platelet Count   Date Value Ref Range Status   11/09/2022 199 150 - 450 10e3/uL Final     Creatinine   Date Value Ref Range Status   11/09/2022 0.65 0.51 - 0.95 mg/dL Final     Potassium   Date Value Ref Range Status   11/09/2022 4.0 3.4 - 5.3 mmol/L Final         EXAM:  LMP  (LMP Unknown)   General:  Stable.  In no acute distress.    Neuro:  A&O x 3. Moves all extremities equally.  Resp:  Lungs clear to auscultation bilaterally. 4L NC  Cardio:  S1S2 and reg, without murmur, clicks or rubs  Abdomen:  Soft, non-distended, non-tender, positive bowel sounds.  Skin:  Without excoriations, ecchymosis, erythema, lesions or open sores on upper chest  MSK:  No gross motor weakness.  Sensation intact.  Proprioception intact.    Pre-Sedation Assessment:  Mallampati Airway Classification:  II - Faucial pillars and soft palate may be seen, but uvula is masked by the base of the tongue  Previous reaction to anesthesia/sedation:  No  Sedation plan based on assessment: Minimal sedation to  Moderate (conscious) sedation as needed  ASA Classification: Class 3 - SEVERE SYSTEMIC DISEASE, DEFINITE FUNCTIONAL LIMITATIONS.   Code Status: Full Code intra procedure, per discussion with  patient.       ASSESSMENT/PLAN:   Right lung cancer    Right side port placement with sedation     Procedure, risks/benefits, details, alternatives, and sedation reviewed with patient/family and she verbalized understanding. All questions answered. OK to proceed with above radiology procedure.     Exam/Procedure discussion completed by:  LUIS E HERMOSILLO CNP on 11/11/2022 at 1:06 PM        Note comprised by:  Florencia Hays PA-C  Interventional Radiology

## 2022-11-11 NOTE — PROCEDURES
[facility]    Procedure: IR Procedure Note    Date/Time: 11/11/2022 3:18 PM  Performed by: Arjun Cunningham MD  Authorized by: Arjun Cunningham MD       UNIVERSAL PROTOCOL   Site Marked: NA  Prior Images Obtained and Reviewed:  Yes  Required items: Required blood products, implants, devices and special equipment available    Patient identity confirmed:  Verbally with patient, arm band, provided demographic data and hospital-assigned identification number  Patient was reevaluated immediately before administering moderate or deep sedation or anesthesia  Confirmation Checklist:  Patient's identity using two indicators, relevant allergies, procedure was appropriate and matched the consent or emergent situation and correct equipment/implants were available  Time out: Immediately prior to the procedure a time out was called    Universal Protocol: the Joint Commission Universal Protocol was followed    Preparation: Patient was prepped and draped in usual sterile fashion       ANESTHESIA    Anesthesia: Local infiltration  Local Anesthetic:  Lidocaine 1% without epinephrine    See dictated procedure note for full details.  Findings: SL port via right internal jugular vein, tip at cavoatrial junction.    Specimens: none    Complications: None    Condition: Stable      PROCEDURE  Describe Procedure: SL port via right internal jugular vein, tip at cavoatrial junction.  Patient Tolerance:  Patient tolerated the procedure well with no immediate complications  Length of time physician/provider present for 1:1 monitoring during sedation: 0

## 2022-11-11 NOTE — PROGRESS NOTES
[facility]: Cancer Care Plan of Care Education Note                                    Discussion with Patient:                                                      Chemo education, first day of treatment     Antiemetics: zofran, compazine        Assessment:                                                      Assessment completed with:: Patient    Current living arrangement  I live in assisted living    Plan of Care Education   Diagnosis:: lung cancer  Does patient understand diagnosis?: Yes  Tx plan/regimen:: Carbo, pemetrexed, pembrolizumab  Does patient understand treatment plan/regimen?: Yes  Preparing for treatment:: Reviewed treatment preparation information with patient (vascular access, day of chemo, visitor policy, what to bring, etc.)  Vascular access:: Port  Side effect education:: Diarrhea/Constipation;Nausea/Vomiting;Infection;Hair loss;Fatigue;Lab value monitoring (anemia, neutropenia, thrombocytopenia)  Transportation means:: Family  Safety/self care at home reviewed with patient:: Yes  Informal Support system:: Children;Neighbors  Coping - concerns/fears reviewed with patient:: Yes  Plan of Care:: JONEL follow-up appointment;Lab appointment;Treatment schedule  When to call provider:: Bleeding;Increased shortness of breath;Temperature >100.4F;Uncontrolled diarrhea/constipation;Uncontrolled nausea/vomiting    Evaluation of Learning  Patient Education Provided: Yes  Readiness:: Acceptance  Method:: Literature;Explanation  Response:: Verbalizes understanding    No assessment indicated    Intervention/Education provided during outreach:                                                           Follow up call in 1-2 weeks    Signature:  Jamal Presley RN

## 2022-11-11 NOTE — PRE-PROCEDURE
GENERAL PRE-PROCEDURE:   Procedure:  Port placement  Date/Time:  11/11/2022 1:07 PM    Written consent obtained?: Yes    Risks and benefits: Risks, benefits and alternatives were discussed    Consent given by:  Patient  Patient states understanding of procedure being performed: Yes    Patient's understanding of procedure matches consent: Yes    Procedure consent matches procedure scheduled: Yes    Expected level of sedation:  Moderate  Appropriately NPO:  Yes  ASA Class:  3  Mallampati  :  Grade 2- soft palate, base of uvula, tonsillar pillars, and portion of posterior pharyngeal wall visible  Lungs:  Lungs clear with good breath sounds bilaterally  Heart:  Normal heart sounds and rate  History & Physical reviewed:  History and physical reviewed and no updates needed  Statement of review:  I have reviewed the lab findings, diagnostic data, medications, and the plan for sedation

## 2022-11-11 NOTE — PROVIDER NOTIFICATION
MRI completed after port placement. I remained with patient during MRI. Patient became nauseated and dry heaves after MRI, but then settled. Patient wanting to go home. Discharge instructions reviewed with patient and son, both verbalized understanding of discharge instructions. Belongings returned. Discharged via wheelchair with son remains on 4L/NC throughout day and returned to home O2 tank at 4L.

## 2022-11-11 NOTE — IP AVS SNAPSHOT
[facility] Interventional Radiology  [address] [place] [id number]  Phone: [phone]  Fax: [phone]                                    After Visit Summary   11/11/2022    Nguyen Olivier   MRN: [id number]           After Visit Summary Signature Page    I have received my discharge instructions, and my questions have been answered. I have discussed any challenges I see with this plan with the nurse or doctor.    ..........................................................................................................................................  Patient/Patient Representative Signature      ..........................................................................................................................................  Patient Representative Print Name and Relationship to Patient    ..................................................               ................................................  Date                                   Time    ..........................................................................................................................................  Reviewed by Signature/Title    ...................................................              ..............................................  Date                                               Time          22EPIC Rev 08/18

## 2022-11-11 NOTE — DISCHARGE INSTRUCTIONS
Port Placement Procedure Discharge Instructions:  You had a port placed. A port is a small medical device that is placed under the skin and is connected to a vein with a catheter (thin, flexible tube). Ports can be used to administer IV medications, fluids or blood products (including chemotherapy) or for blood lab draws. Please follow the below instructions:  Care Instructions:  - If you received sedation for your procedure, do not drive or operate heavy machinery for the rest of the day.  - You may shower beginning post procedure day #1.  Do not scrub site until well healed; pat dry.  - Avoid submerging the port site under water (tub baths, Jacuzzis, hot tubs and pools) for 10 days or until glue falls off.  - You may take over the counter pain medication for discomfort. Follow the package directions.  - Avoid heavy lifting (greater than 10 pounds) and strenuous activities for 3 days.   - If you experience significant bleeding at site, apply pressure with hands above the clavicle bone, sit upright and seek immediate medical assistance.    Call [facility] ([phone])*** if you experience the following:   - Uncontrolled bleeding from port site  - Fever (greater than 101 F (38.3C))  - Purulent (yellow/green/foul smelling) drainage from port insertion site.  - Increasing pain at port site  - Increasing redness at port site

## 2022-11-14 NOTE — PROGRESS NOTES
PT here in wheelchair for first treatment with son. Reviewed txt/duration /possible side effects and how to manage at home. Port accessed /labs drawn/approved for txt. Txt administered as ordered. PT tolerated txt without any problems. txt completed/port flushed/deaccessed with 2x2 to site. Follow up reviewed and pt dc'd steady gait.

## 2022-11-19 NOTE — ED TRIAGE NOTES
Patient was here in September and was dx with stage 4 lung cancer. Patient is here today due to throat pain and inability to swallow. Patient has not been able to eat nor drink since yesterday. Patient states she is drooling and anytime she goes to swallow she chokes, coughs and gags.     Triage Assessment     Row Name 11/19/22 1240       Triage Assessment (Adult)    Airway WDL X;airway symptoms    Additional Documentation Breath Sounds (Group)       Respiratory WDL    Respiratory WDL X;rhythm/pattern    Rhythm/Pattern, Respiratory tachypneic;breathlessness       Breath Sounds    Breath Sounds All Fields    All Lung Fields Breath Sounds diminished;equal bilaterally;wheezes, expiratory       Skin Circulation/Temperature WDL    Skin Circulation/Temperature WDL WDL       Cardiac WDL    Cardiac WDL WDL       Peripheral/Neurovascular WDL    Peripheral Neurovascular WDL WDL       Cognitive/Neuro/Behavioral WDL    Cognitive/Neuro/Behavioral WDL WDL

## 2022-11-19 NOTE — ED NOTES
eMERGENCY dEPARTMENT PROGRESS NOTE         ED COURSE AND MEDICAL DECISION MAKING  Patient was signed out to me by Jaqueline Wang MD at 3:40 PM      5:55 PM I spoke with [facility] regarding patient.    6:01 PM I spoke with oncologist Dr. Santamaria regarding patient.    6:20 PM I spoke with hospitalist Dr. Toth regarding patient.    Nguyen Olivier is a 77 year old female who presents for evaluation of dysphagia. Patient reported mouth and throat pain along with trouble swallowing. Patient has been unable to eat and drink since 11/18 (~1 day ago). Patient has a productive cough with blood present in the phlegm. She is currently undergoing chemo for lung cancer. Denies vomiting, chest pain, abdominal pain, shortness of breath, dysuria, hematuria, and diarrhea.     ED Course as of 11/19/22 1824   Sat Nov 19, 2022 1701 I went back to see the patient.  She was unable to swallow applesauce.  I have ordered some morphine for her.  She is concerned about some pain and feeling like there is a mass in her neck.  She is some tenderness to the right side of her neck but I do not feel a mass there.  We can get CT imaging of the neck but I think she is ultimately jm need admission to the hospital for pain control and IV fluids.   1754 I just got a call from radiology.  The patient has clot in her right internal jugular vein.  Its around the site of the catheter.  This is exactly where she is having discomfort.  I will put a call out to hematology but I suspect she will need heparin and admission to the hospital.   1807 I spoke with Dr. Santamaria with hematology.  He felt Lovenox was fine and then they can switch her to a DOAC as an outpatient.   1822 I discussed the case with Dr. Toth with the hospitalist service.  He agreed with a [facility] observation admission.         LAB  Pertinent labs results reviewed   Labs Ordered and Resulted from Time of ED Arrival to Time of ED Departure   BASIC METABOLIC PANEL -  Abnormal       Result Value    Sodium 131 (*)     Potassium 4.1      Chloride 94 (*)     Carbon Dioxide (CO2) 26      Anion Gap 11      Urea Nitrogen 13.2      Creatinine 0.55      Calcium 8.3 (*)     Glucose 70      GFR Estimate >90     HEPATIC FUNCTION PANEL - Abnormal    Protein Total 5.1 (*)     Albumin 3.0 (*)     Bilirubin Total 1.4 (*)     Alkaline Phosphatase 57      AST 14      ALT 17      Bilirubin Direct 0.34 (*)    CBC WITH PLATELETS AND DIFFERENTIAL - Abnormal    WBC Count 9.3      RBC Count 4.69      Hemoglobin 13.4      Hematocrit 40.5      MCV 86      MCH 28.6      MCHC 33.1      RDW 14.6      Platelet Count 49 (*)     % Neutrophils 87      % Lymphocytes 9      % Monocytes 0      % Eosinophils 2      % Basophils 0      % Immature Granulocytes 2      NRBCs per 100 WBC 0      Absolute Neutrophils 8.2      Absolute Lymphocytes 0.9      Absolute Monocytes 0.0      Absolute Eosinophils 0.2      Absolute Basophils 0.0      Absolute Immature Granulocytes 0.2      Absolute NRBCs 0.0     LACTIC ACID WHOLE BLOOD - Normal    Lactic Acid 1.1     COVID-19 VIRUS (CORONAVIRUS) BY PCR         RADIOLOGY    Pertinent imaging reviewed   Please see official radiology report.  Soft tissue neck CT w contrast   Preliminary Result   IMPRESSION:    1.  Right sided internal jugular vein thrombus.   2.  Right-sided pleural effusion with right upper lobe intralobular septal thickening.          FINAL IMPRESSION    1. Oropharyngeal dysphagia    2. Unable to eat    3. History of lung cancer    4. Thrombocytopenia (H)    5. On home oxygen therapy    6. 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 Jimenez based on my observation and the provider's statements to me. I, Nery Jimenez MD attest that Hayley Montenegro is acting in a scribe capacity, has observed my performance of the services and has documented them in accordance with  my direction.     Nery Jimenez MD  11/19/22 1825

## 2022-11-19 NOTE — ED PROVIDER NOTES
EMERGENCY DEPARTMENT ENCOUNTER      NAME: Nguyen Olivier  AGE: 77 year old female  YOB: 1945  MRN: [id number]  EVALUATION DATE & TIME: No admission date for patient encounter.    PCP: No Ref-Primary, Physician    ED PROVIDER: Jaqueline Wang M.D.      CHIEF COMPLAINT     Chief Complaint   Patient presents with     Dysphagia         FINAL IMPRESSION:     1. Oropharyngeal dysphagia    2. Unable to eat    3. History of lung cancer    4. Thrombocytopenia (H)    5. On home oxygen therapy          MEDICAL DECISION MAKING:       Pertinent Labs & Imaging studies reviewed. (See chart for details)    77 year old female presents to the Emergency Department for evaluation of painful swolling    Patient presents with son  Recent diagnosis of lung cancer  Had 1 chemo treatment  Now with severe oral pain and unable to eat as a result  On home oxygen    On exam ill appearing  tachypneic  Cachetic  Tachycardic  On NC oxygen 4 liters    Oropharynx erythematous dry some white plaques  Painful to minimal touch    Port accessed  1 L NS given  Magic mouth wash    Labs relatively normal  Will attempt PO intake  If unable to tolerate will need admission for symptomatic treatment and hydration signed out to Dr. Jimenez    Differential Diagnosis (include but not limited to)  Candida malignancy dehydration electrolyte abnormalities [facility] EM among others      Vital Signs: tachycardic  EKG: none  Imaging: none  Home Meds: reviewed  ED meds/abx: magic mouth wash  Fluids: 1 LR    Labs    Platelets 49  Lactic 1.1    Medical Decision Making    History:    Supplemental history from: Family Member/Significant Other    External Record(s) reviewed: Documented in HPI, if applicable. and Outpatient Record: See Note    Work Up:    Chart documentation includes differential considered and any EKGs or imaging interpreted by provider.    In additional to work up documented, I considered the following work up: See chart documentation, if  applicable.    External consultation:    Discussion of management with another provider: See chart documentation, if applicable    Complicating factors:    Care impacted by chronic illness: Cancer/Chemotherapy    Care affected by social determinants of health: N/A    Disposition considerations: Admission considered. Patient was signed out to the oncoming physician, disposition pending.         Review of Previous Records  11/9/22  Patient evaluated at [facility] for lung cancer following a referral.   1.  Adenocarcinoma of the right lung: I met with the patient and her family.  We reviewed her recent right supraclavicular lymph node biopsy which showed poorly differentiated non-small cell carcinoma of the lung, suggesting adenocarcinoma. Results displaying stage IV lung cancer shared with the patient along with implications and treatment.    XR CHEST 2 VIEWS 10/12/2022 10:57 AM    INDICATION: f/u pneumonia - worsening breathing since hospitalization COMPARISON: 10/2/2022    IMPRESSION: Moderate right pleural effusion has decreased in size compared to previous. There is persistent consolidation in the right lower lobe. Interstitial opacities throughout the lungs, right greater than left similar to previous and may represent additional pneumonitis or edema. Heart size normal. No new findings.    EXAM: PET ONCOLOGY WHOLE BODY LOCATION: [facility] DATE/TIME: 10/13/2022 11:07 AM INDICATION: Other nonspecific abnormal finding of lung field   IMPRESSION: Findings suspicious for infiltrative lung neoplasm with metastases involving lymph nodes above and below the diaphragm and right pleura.    9/29/22 - 10/4/22  Patient admitted to [facility] for acute hypoxic respiratory failure. CT showed extensive consolidation throughout the right lung consistent with advanced pneumonia with associated occlusion of right middle and right lower lobe airways, marked thickening of the right  upper lobe airways as well as mediastinal,hilar lymphadenopathy and 3cm aneurysmal dilatation of the infrarenal abdominal aorta. Bronchoscopy 10/1/2022 revealed endobronchial tumor occluding airway and possible postobstructive pneumonia.  Results of cytology from BAL and brushing is pending.     She received bronchodilators, steroid therapy, as well as IV antibiotics during hospital stay and was subsequently transitioned to p.o. antibiotics for postobstructive pneumonia as recommended by pulmonologist.  Symptoms improved with antibiotic therapy, bronchodilators and steroid therapy, however, she continues to require 4 L of supplemental oxygen therefore she will be discharged home with home oxygen.  She is clinically stable for discharge at this time.    LUNGS AND PLEURA: Extensive consolidation and interstitial thickening throughout the right lung is most dense in the right lower lobe and central portions of the right upper and right middle lobe. Airways of the right middle and right lower lobes are occluded. Marked thickening of the right upper lobe airways. Small right pleural effusion. Mild scarring in the left upper lobe. Mild atelectasis in the left lower lobe.    Consults  Dr. Holloway, hospitalist    ED COURSE   12:53 PM I met with the patient for the initial interview and physical examination. Discussed plan for treatment and workup in the ED.      1:57 PM re evaluated  Spoke with Dr. Holloway  im concern patient is not going to be able to tolerate po    2:25 PM signed out to Dr. Cabrera pending Po challenge      At the conclusion of the encounter I discussed the results of all of the tests and the disposition. The questions were answered. The patient and son acknowledged understanding and was agreeable with the care plan.         MEDICATIONS GIVEN IN THE EMERGENCY:     Medications   sodium chloride (PF) 0.9% PF flush 10-20 mL (has no administration in time range)   sodium chloride (PF) 0.9% PF flush 10-20 mL (has no  administration in time range)   sodium chloride (PF) 0.9% PF flush 10-20 mL (10 mLs Intracatheter Not Given 11/19/22 1415)   heparin lock flush 10 UNIT/ML injection 5-10 mL (has no administration in time range)   heparin lock flush 10 UNIT/ML injection 5-10 mL (5 mLs Intracatheter Not Given 11/19/22 1415)   heparin 100 UNIT/ML injection 5-10 mL (5 mLs Intracatheter Not Given 11/19/22 1414)   lactated ringers BOLUS 1,000 mL (0 mLs Intravenous Stopped 11/19/22 1550)   magic mouthwash suspension (diphenhydramine, lidocaine, aluminum-magnesium & simethicone) (10 mLs Swish & Swallow Given 11/19/22 1343)   morphine (PF) injection 2 mg (2 mg Intravenous Given 11/19/22 1644)       NEW PRESCRIPTIONS STARTED AT TODAY'S ER VISIT     New Prescriptions    No medications on file          =================================================================    HPI     Patient information was obtained from: Patient     Use of : N/A         Nguyen Olivier is a 77 year old female with a pertinent medical history of malignant neoplasm of lower lobe of right lung who presents to this ED by ambulance accompanied by son for evaluation of dysphagia.     Patient reports severe mouth/throat pain and inability to swallow. This has led to the patient being unable to eat or drink anything since yesterday. She also notes productive cough with blood present in phlegm. Patient is currently undergoing chemotherapy for lung cancer. Patient denies vomiting, chest pain, abdominal pain, shortness of breath, dysuria, hematuria, diarrhea, or additional symptoms or complaints at this time.     REVIEW OF SYSTEMS   Review of Systems   HENT: Positive for mouth sores (tongue erythema) and sore throat.    Respiratory: Positive for cough (productive). Negative for shortness of breath.    Cardiovascular: Negative for chest pain.   Gastrointestinal: Negative for abdominal pain, diarrhea and vomiting.   Genitourinary: Negative for dysuria and  "hematuria.   All other systems reviewed and are negative.       PAST MEDICAL HISTORY:     Past Medical History:   Diagnosis Date     Cancer (H)     lung     Right Macular hole        PAST SURGICAL HISTORY:     Past Surgical History:   Procedure Laterality Date     BRONCHOSCOPY FLEXIBLE AND RIGID N/A 10/1/2022    Procedure: BRONCHOSCOPY WITH BRONCHOALVEOLAR LAVAGE & BRUSHINGS;  Surgeon: Monica Johnson MD;  Location: [facility] [place] OR      CHEST PORT PLACEMENT > 5 YRS OF AGE  2022     right vitrectomy Right          CURRENT MEDICATIONS:   albuterol (PROAIR HFA/PROVENTIL HFA/VENTOLIN HFA) 108 (90 Base) MCG/ACT inhaler  albuterol (PROAIR HFA/PROVENTIL HFA/VENTOLIN HFA) 108 (90 Base) MCG/ACT inhaler  budesonide (PULMICORT) 0.5 MG/2ML neb solution  dexamethasone (DECADRON) 2 MG tablet  dexamethasone (DECADRON) 4 MG tablet  diazepam (VALIUM) 5 MG tablet  ipratropium - albuterol 0.5 mg/2.5 mg/3 mL (DUONEB) 0.5-2.5 (3) MG/3ML neb solution  ipratropium - albuterol 0.5 mg/2.5 mg/3 mL (DUONEB) 0.5-2.5 (3) MG/3ML neb solution  ondansetron (ZOFRAN) 8 MG tablet  prochlorperazine (COMPAZINE) 10 MG tablet         ALLERGIES:   No Known Allergies    FAMILY HISTORY:   No family history on file.    SOCIAL HISTORY:     Social History     Socioeconomic History     Marital status:    Tobacco Use     Smoking status: Former     Types: Cigarettes     Quit date:      Years since quittin.8     Smokeless tobacco: Never   Substance and Sexual Activity     Drug use: Never       VITALS:   /53   Pulse 87   Temp 99.3  F (37.4  C) (Oral)   Resp (!) 40   Ht 1.6 m (5' 3\")   Wt 61.7 kg (136 lb)   LMP  (LMP Unknown)   SpO2 98%   BMI 24.09 kg/m      PHYSICAL EXAM     Physical Exam  Vitals and nursing note reviewed. Exam conducted with a chaperone present.   Constitutional:       Appearance: She is ill-appearing.   HENT:      Mouth/Throat:      Mouth: Mucous membranes are dry.      Pharynx: Posterior oropharyngeal " erythema present.   Cardiovascular:      Rate and Rhythm: Tachycardia present.         Physical Exam   Constitutional: Chronically ill-appearing, pale, cooperative, pleasant     Head: Atraumatic.     Nose: Nose normal.     Mouth/Throat: Erythema of the tongue and posterior pharynx     Eyes: EOM are normal. Pupils are equal, round, and reactive to light.     Ears: Pearly white TMs bilaterally     Neck: Normal range of motion. Neck supple.     Cardiovascular: Tachycardia, regular rhythm and normal heart sounds.      Pulmonary/Chest: Normal effort  and breath sounds normal. Right anterior portacath    Abdominal: Soft, no tenderness     Musculoskeletal: Normal range of motion.     Neurological: No focal deficits    Lymphatics: No edema    : N/A    Skin: Skin is warm and dry.     Psychiatric: Normal mood and affect. Behavior is normal.       LAB:     All pertinent labs reviewed and interpreted.  Labs Ordered and Resulted from Time of ED Arrival to Time of ED Departure   BASIC METABOLIC PANEL - Abnormal       Result Value    Sodium 131 (*)     Potassium 4.1      Chloride 94 (*)     Carbon Dioxide (CO2) 26      Anion Gap 11      Urea Nitrogen 13.2      Creatinine 0.55      Calcium 8.3 (*)     Glucose 70      GFR Estimate >90     HEPATIC FUNCTION PANEL - Abnormal    Protein Total 5.1 (*)     Albumin 3.0 (*)     Bilirubin Total 1.4 (*)     Alkaline Phosphatase 57      AST 14      ALT 17      Bilirubin Direct 0.34 (*)    CBC WITH PLATELETS AND DIFFERENTIAL - Abnormal    WBC Count 9.3      RBC Count 4.69      Hemoglobin 13.4      Hematocrit 40.5      MCV 86      MCH 28.6      MCHC 33.1      RDW 14.6      Platelet Count 49 (*)     % Neutrophils 87      % Lymphocytes 9      % Monocytes 0      % Eosinophils 2      % Basophils 0      % Immature Granulocytes 2      NRBCs per 100 WBC 0      Absolute Neutrophils 8.2      Absolute Lymphocytes 0.9      Absolute Monocytes 0.0      Absolute Eosinophils 0.2      Absolute Basophils 0.0       Absolute Immature Granulocytes 0.2      Absolute NRBCs 0.0     LACTIC ACID WHOLE BLOOD - Normal    Lactic Acid 1.1          RADIOLOGY:     Reviewed all pertinent imaging. Please see official radiology report.  Soft tissue neck CT w contrast    (Results Pending)        EKG:       I have independently reviewed and interpreted the EKG(s) documented above.      PROCEDURES:     Procedures      I, Domenic Jason, am serving as a scribe to document services personally performed by Dr. Wang based on my observation and the provider's statements to me. I, Jaqueline Wang MD attest that Domenic Jason is acting in a scribe capacity, has observed my performance of the services and has documented them in accordance with my direction.    Jaqueline Wang M.D.  Emergency Medicine  [facility] EMERGENCY DEPARTMENT  [address] [place] [id number] [phone]  Dept: [phone]     Jaqueline Wang MD  11/19/22 8620

## 2022-11-20 PROBLEM — I82.C11 THROMBOSIS OF RIGHT INTERNAL JUGULAR VEIN (H): Status: ACTIVE | Noted: 2022-01-01

## 2022-11-20 PROBLEM — R63.8 UNABLE TO EAT: Status: ACTIVE | Noted: 2022-01-01

## 2022-11-20 PROBLEM — D69.6 THROMBOCYTOPENIA (H): Status: ACTIVE | Noted: 2022-01-01

## 2022-11-20 PROBLEM — Z99.81 ON HOME OXYGEN THERAPY: Status: ACTIVE | Noted: 2022-01-01

## 2022-11-20 PROBLEM — Z85.118 HISTORY OF LUNG CANCER: Status: ACTIVE | Noted: 2022-01-01

## 2022-11-20 PROBLEM — R13.12 OROPHARYNGEAL DYSPHAGIA: Status: ACTIVE | Noted: 2022-01-01

## 2022-11-20 NOTE — CONSULTS
Care Management Initial Consult    General Information  Assessment completed with: Patient, Children,    Type of CM/SW Visit: Initial Assessment    Primary Care Provider verified and updated as needed: Yes   Readmission within the last 30 days: no previous admission in last 30 days      Reason for Consult: discharge planning  Advance Care Planning: Advance Care Planning Reviewed: no concerns identified          Communication Assessment  Patient's communication style: spoken language (English or Bilingual)             Cognitive  Cognitive/Neuro/Behavioral: WDL                      Living Environment:   People in home: alone     Current living Arrangements: apartment      Able to return to prior arrangements: yes       Family/Social Support:  Care provided by: self, friend  Provides care for: no one, unable/limited ability to care for self  Marital Status:   Children          Description of Support System: Supportive, Involved    Support Assessment: Adequate family and caregiver support    Current Resources:   Patient receiving home care services: No     Community Resources: None  Equipment currently used at home: none  Supplies currently used at home: Oxygen Tubing/Supplies    Employment/Financial:  Employment Status: retired        Financial Concerns:             Lifestyle & Psychosocial Needs:  Social Determinants of Health     Tobacco Use: Medium Risk     Smoking Tobacco Use: Former     Smokeless Tobacco Use: Never     Passive Exposure: Not on file   Alcohol Use: Not on file   Financial Resource Strain: Not on file   Food Insecurity: Not on file   Transportation Needs: Not on file   Physical Activity: Not on file   Stress: Not on file   Social Connections: Not on file   Intimate Partner Violence: Not on file   Depression: Not at risk     PHQ-2 Score: 2   Housing Stability: Not on file       Functional Status:  Prior to admission patient needed assistance:   Dependent ADLs:: Independent, Ambulation-no  assistive device, Bathing  Dependent IADLs:: Independent, Transportation, Laundry       Mental Health Status:  Mental Health Status: No Current Concerns       Chemical Dependency Status:  Chemical Dependency Status: No Current Concerns             Values/Beliefs:  Spiritual, Cultural Beliefs, Latter-day Practices, Values that affect care: no               Additional Information:  SW met with patient and her son in room to discuss discharge planning and complete initial assessment. Patient lives at home alone but her sons do not think it is safe for her to live alone anymore. Patient is needing assistance with ambulation, transportation, laundry, and bathing. A family member who is a HHA stops by once a week to give patient a shower and sons provide all transportation. Patient has home O2 through  Home medical. Patient stated she no longer wants chemo and potentially wants to go on hospice. If she goes on hospice she'd like to go to [facility]. Patient's son requested a consult with palliative to get more information on their options and then would potentially be interested in a hospice consult at a later date. Palliative consult pending. SW gave and discussed Hall. SW to follow for medical progression and discharge recommendations.     Rachel Georges

## 2022-11-20 NOTE — ED NOTES
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)  Comment:   Plan:        PMH:    Past Medical History:   Diagnosis Date     Cancer (H)     lung     Right Macular hole         Code Status:  Prior     Falls Risk: Yes Band: Applied    Current Living Situation/Residence: lives alone     Elimination Status: Continent: Yes     Activity Level: 2 assist    Patients Preferred Language:  English     Needed: No    Vital Signs:  /53   Pulse 87   Temp 99.3  F (37.4  C) (Oral)   Resp (!) 40   Ht 1.6 m (5' 3\")   Wt 61.7 kg (136 lb)   LMP  (LMP Unknown)   SpO2 98%   BMI 24.09 kg/m       Cardiac Rhythm:     Pain Score: 7/10    Is the Patient Confused:  No    Last Food or Drink: 11/18/22     Focused Assessment:  A/o x4. Sob. On 4lit 02 via n/c at her baseline. C/o difficulty of swallowing due to pain. Magic mouth wash didn't help. Iv morphine given. Power Port accessed. 1 assist with transfer. Pt is too weak to walk.    Tests Performed: Done: Labs and Imaging    Treatments Provided:      Family Dynamics/Concerns: No    Family Updated On Visitor Policy: Yes    Plan of Care Communicated to Family: Yes    Who Was Updated about Plan of Care: Son at bedside    Belongings Checklist Done and Signed by Patient: No    Medications sent with patient:     Covid: symptomatic,     Additional Information:     RN: Ritesh Thornton RN   11/19/2022 6:30 PM      "

## 2022-11-20 NOTE — PLAN OF CARE
PRIMARY DIAGNOSIS: GENERALIZED WEAKNESS    OUTPATIENT/OBSERVATION GOALS TO BE MET BEFORE DISCHARGE  1. Orthostatic performed: No    2. Tolerating PO medications: no    3. Return to near baseline physical activity: No    4. Cleared for discharge by consultants (if involved): N/A    Discharge Planner Nurse   Safe discharge environment identified: Yes  Barriers to discharge: Yes       Entered by: Carmenza Chen RN 11/20/2022 5:54 AM   Pt received IV dilaudid for throat discomfort.  Discouraged with the way her throat is feeling describing is as burning.  SOB with activity.  5L oxygen via nasal cannula.  SBA to the bathroom.    NSR on telemetry.  Carmenza Chen, MASOOD    Please review provider order for any additional goals.   Nurse to notify provider when observation goals have been met and patient is ready for discharge.Goal Outcome Evaluation:

## 2022-11-20 NOTE — PROGRESS NOTES
"PRIMARY DIAGNOSIS: \"GENERIC\" NURSING  OUTPATIENT/OBSERVATION GOALS TO BE MET BEFORE DISCHARGE:  1. ADLs back to baseline: No    2. Activity and level of assistance: Up with standby assistance.    3. Pain status: Improved but still requiring IV narcotics.    4. Return to near baseline physical activity: Yes up SBA generalized weakness to BR with SOB on exertion      Discharge Planner Nurse   Safe discharge environment identified: Yes  Barriers to discharge: No       Entered by: Enedelia Shaffer RN 11/19/2022 11:11 PM     Please review provider order for any additional goals.   Nurse to notify provider when observation goals have been met and patient is ready for discharge.    Pt given IV Dilaudid 2mg through her orestes catheter. She was complaining of throat pain 8/10 this brought her pain down to a 6/10. Pt is up SBA to the BR with GB SOB on exertion. Pt on telemetry in NSR. On 5L NC saturations 95%.  Pt complained of having a hard time breathing in bed. After Writer encouraged pt to deep breath and sat her up to Fowlers position this helped her Pt after said \"that she felt anxious.\" when this was happening Pt was coughing up some phlegm with blood in it.     BP 92/50 (BP Location: Left arm)   Pulse 88   Temp 98.9  F (37.2  C) (Oral)   Resp 22   Ht 1.6 m (5' 3\")   Wt 61.7 kg (136 lb)   LMP  (LMP Unknown)   SpO2 95%   BMI 24.09 kg/m      "

## 2022-11-20 NOTE — UTILIZATION REVIEW
Admission Status; Secondary Review Determination       Under the authority of the Utilization Management Committee, the utilization review process indicated a secondary review on the above patient. The review outcome is based on review of the medical records, discussions with staff, and applying clinical experience noted on the date of the review.     (x) Inpatient Status Appropriate - This patient's medical care is consistent with medical management for inpatient care and reasonable inpatient medical practice.     RATIONALE FOR DETERMINATION     Ms. Olivier is a 76 yo female with a PMH of lung cancer (recently started on chemo tx) who presents with neck pain, mouth pain and resulting severe dysphagia.  She was noted to have oropharyngeal candidiasis; has been unable to tolerate any solid foods yet d/t severe pain despite treatment with magic mouthwash and scheduled nystatin.  She remains on IVF with dextrose and still requiring IV narcotics for pain control today.  Noted to be hypotensive and tachycardic today. CT imaging also revealed acute right internal jugular vein thrombus; on subcutaneous lovenox with oncology consult requested.      At the time of admission with the information available to the attending physician more than 2 nights Hospital complex care was anticipated, based on patient risk of adverse outcome if treated as outpatient and complex care required. Inpatient admission is appropriate based on the Medicare guidelines.       The information on this document is developed by the utilization review team in order for the business office to ensure compliance. This only denotes the appropriateness of proper admission status and does not reflect the quality of care rendered.   The definitions of Inpatient Status and Observation Status used in making the determination above are those provided in the CMS Coverage Manual, Chapter 1 and Chapter 6, section 70.4.         Sincerely,     Adelaide  Shonna, DO  Utilization Review  Physician Advisor  [facility].

## 2022-11-20 NOTE — H&P
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 *       ASSESSMENT AND PLAN:  Lung cancer and recently started chemotherapy, consult oncology to evaluate    Severe dysphagia and mouth pain, difficulty in swallowing, CT neck unrevealing for neck mass.  Possibly due to mucositis/Candida.  Begin scheduled nystatin and Magic mouthwash.  Oncology to see    Intractable pain above, begin IV narcotic, palliative care to see for symptom management    Right IJ vein thrombus noted on CT imaging.  Oncology recommended Lovenox to begin tonight.    Chronic respiratory failure, on home oxygen therapy, chest x-ray tonight showing unchanged right base opacity and moderate effusion.  Oncology consulted    Full code    Will consult oncology and palliative care to assist with management    DVT PPX:  Lovenox subacute    Barriers to discharge dysphagia    Anticipated Discharge date observation less than 2 midnight      Addendum   patient with low platelet count therefore single dose of Lovenox given tonight.  Oncology to evaluate in the a.m. for alternative anticoagulation if needed.    Chief Complaint:  Difficulty in swallowing few days    HPI:  Nguyen Olivier is a 77 year old old female poor historian brought to ED for evaluation of difficulty swallowing and now pain for a few days duration.  Recently started  chemotherapy for lung cancer.  Denies abdominal pain or diarrhea.  No fever or chills.  No neck pain headaches.  Work-up also showed new IJ thrombus.  ED spoke to oncology recommend admission and begin Lovenox.  Oncology consulted for further management      Medical History  Past Medical History:   Diagnosis Date     Cancer (H)     lung     Right Macular hole           Surgical History  She  has a past surgical history that includes right vitrectomy (Right); Bronchoscopy flexible and rigid (N/A, 10/1/2022); and IR Chest Port Placement > 5 Yrs of Age (2022).      SOCIAL HISTORY:  Social History     Socioeconomic History     Marital status:      Spouse name: Not on file     Number of children: Not on file     Years of education: Not on file     Highest education level: Not on file   Occupational History     Not on file   Tobacco Use     Smoking status: Former     Types: Cigarettes     Quit date:      Years since quittin.8     Smokeless tobacco: Never   Substance and Sexual Activity     Alcohol use: Not on file     Drug use: Never     Sexual activity: Not on file   Other Topics Concern     Not on file   Social History Narrative     Not on file     Social Determinants of Health     Financial Resource Strain: Not on file   Food Insecurity: Not on file   Transportation Needs: Not on file   Physical Activity: Not on file   Stress: Not on file   Social Connections: Not on file   Intimate Partner Violence: Not on file   Housing Stability: Not on file       FAMILY HISTORY:  No family history on file.      ALLERGIES:  No Known Allergies    MEDICATIONS:  Medications Prior to Admission   Medication Sig Dispense Refill Last Dose     albuterol (PROAIR HFA/PROVENTIL HFA/VENTOLIN HFA) 108 (90 Base) MCG/ACT inhaler Inhale 2 puffs into the lungs every 4 hours as needed for shortness of breath / dyspnea or wheezing 18 g 6 Past Week     budesonide (PULMICORT) 0.5 MG/2ML neb solution Take 2 mLs (0.5 mg) by  "nebulization 2 times daily 150 mL 1 11/18/2022     dexamethasone (DECADRON) 4 MG tablet Take 1 tablet (4 mg) by mouth 2 times daily (with meals) Start one day prior to Pemetrexed (Alimta), continue on the day of treatment, and the day following Pemetrexed. 6 tablet 3 11/14/2022     ipratropium - albuterol 0.5 mg/2.5 mg/3 mL (DUONEB) 0.5-2.5 (3) MG/3ML neb solution Take 1 vial (3 mLs) by nebulization every 6 hours as needed for shortness of breath / dyspnea or wheezing 90 mL 0 Unknown     ondansetron (ZOFRAN) 8 MG tablet Take 1 tablet (8 mg) by mouth every 8 hours as needed (Nausea/Vomiting) 30 tablet 2 Unknown     prochlorperazine (COMPAZINE) 10 MG tablet Take 1 tablet (10 mg) by mouth every 6 hours as needed (Nausea/Vomiting) 30 tablet 2 Unknown     ipratropium - albuterol 0.5 mg/2.5 mg/3 mL (DUONEB) 0.5-2.5 (3) MG/3ML neb solution Take 1 vial (3 mLs) by nebulization 4 times daily for 30 days (Patient not taking: Reported on 11/19/2022) 360 mL 11 Not Taking         ROS:  12 point review of systems reviewed and is negative except as stated above      PHYSICAL EXAM:  BP 99/54 (BP Location: Left arm)   Pulse 94   Temp 98.3  F (36.8  C) (Oral)   Resp 22   Ht 1.6 m (5' 3\")   Wt 61.7 kg (136 lb)   LMP  (LMP Unknown)   SpO2 97%   BMI 24.09 kg/m      General: Not very interactive verbally, cachectic  Mouth sores noted with significant tongue erythema   Neck- supple, No JVP elevation, lymphadenopathy or thyromegaly. Trachea-central.  Chest: Clear to auscultation bilaterally.  Heart: S1S2 regular. No M/R/G.  Abdomen: Soft. NT, ND. No organomegaly. Bowel sounds- active.  Back: No spine tenderness. No CVA tenderness.  Extremities: No leg swelling. Peripheral pulses 2+ bilaterally.  Neuro: No focal neurological deficit  Skin: no skin rashes     DIAGNOSTIC DATA:        EKG Results: Personally reviewed        Advanced Care Planning       Bk Toth MD       "

## 2022-11-20 NOTE — PLAN OF CARE
"PRIMARY DIAGNOSIS: \"GENERIC\" NURSING  OUTPATIENT/OBSERVATION GOALS TO BE MET BEFORE DISCHARGE:  1. ADLs back to baseline: No    2. Activity and level of assistance: Up with standby assistance.    3. Pain status: Improved but still requiring IV narcotics.    4. Return to near baseline physical activity: No     Discharge Planner Nurse   Safe discharge environment identified: No  Barriers to discharge: Yes       Entered by: Alem Andujar RN 11/20/2022 12:04 PM    Pt with occasional cough, congested, productive.  Pt refusing to eat or drink due to sore throat. BG low after blood draw this morning 56, rechecked BG 61. Pt states she is not diabetic. Pt does not have PRN orders for low BG; MD paged.     New order PRN 25 ml IV dextrose given and New order IV fluid D5%, NS.9% at 75 ml/hr continuous started. BG recheck after 15 min 195.    Telemetry: NSR, S. [place].    PRN Dilaudid given x 2 for pain, throat and back; pt reported relief.  Pt is resting quietly.     Please review provider order for any additional goals.   Nurse to notify provider when observation goals have been met and patient is ready for discharge.      Goal Outcome Evaluation:                        "

## 2022-11-20 NOTE — CONSULTS
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  A very pleasant 77-year-old woman with severe oropharyngeal candidiasis causing difficulty in swallowing and pain for which she came to the emergency room.  2.  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  3.  Start anticoagulation.  4.  Thrombocytopenia which is raising the concern for HIT.  5.  Mild hyponatremia which is most likely secondary to pain.  6.  Some degree of anxiety.  7.  Rash all over.    Plan and medical decision making      1.  At this time I would recommend switching her anticoagulation from Lovenox to Eliquis.  2.  Check serotonin release assay if the antihappen antibodies are positive.    3.  Diflucan 200 mg daily for at least 3 days.  4.  Pain control.  5.  Hydration  6.  Ambulate.  7.  Supportive care.  8.  Continue to monitor her rash.  I think this could be drug rash.  Not sure exactly what caused it.  Could be paraneoplastic as well.    Clinical/pathological stage      Cancer Staging   Malignant neoplasm of lower lobe of right lung (H)  Staging form: Lung, AJCC 8th Edition  - Clinical: Stage IVB (cT3, cN3, cM1c) - Unsigned      History of present illness      Ms. Nguyen Olivier is a 77 year old woman who has been admitted with dysphagia that has been going on for the past few days and it became somewhat unbearable so she came to the emergency room on 19 November 2022.  She was seen in the emergency  room and had a CT scan of the neck done.  The CT scan of the neck showed that she had right internal jugular vein thrombosis.  This is close to where she had the Port-A-Cath placed.    She has recently been diagnosed with adenocarcinoma located in the right lung measuring several centimeters in size presenting with some shortness of breath and cough symptoms.  Her biopsy has been consistent with non-small cell carcinoma adenocarcinoma.  PET scan showed that she had besides lung and pleural involvement lymph nodes both above and below the diaphragm.  There was no significant lymphadenopathy noted in the neck however.    She has been started on some anticoagulation with Lovenox for her thrombosis.  Her platelet count has dropped so there is no other concern about that.  She is also been started on antifungal agents for her oropharyngeal candidiasis which is most likely causing her pain.    She is somewhat fatigued and tired.  She is complaining of a lot of rash on her back, chest and legs.  The back is particularly bad.    Detailed review of systems      A 14 point review of systems was obtained.  Positive findings noted in the history.  Rest of the review of system is otherwise negative.      Past medical/surgical/social/family history        Past Medical History:   Diagnosis Date     Cancer (H)     lung     Right Macular hole      Past Surgical History:   Procedure Laterality Date     BRONCHOSCOPY FLEXIBLE AND RIGID N/A 10/1/2022    Procedure: BRONCHOSCOPY WITH BRONCHOALVEOLAR LAVAGE & BRUSHINGS;  Surgeon: Monica Johnson MD;  Location: [facility]      CHEST PORT PLACEMENT > 5 YRS OF AGE  2022     right vitrectomy Right      Family history negative for any malignancy.    Social History     Socioeconomic History     Marital status:    Tobacco Use     Smoking status: Former     Types: Cigarettes     Quit date:      Years since quittin.8     Smokeless tobacco: Never   Substance and Sexual  "Activity     Drug use: Never           Allergies      No Known Allergies      Physical exam        /58 (BP Location: Right arm)   Pulse 102   Temp 99.8  F (37.7  C) (Oral)   Resp 18   Ht 1.6 m (5' 3\")   Wt 61.7 kg (136 lb)   LMP  (LMP Unknown)   SpO2 94%   BMI 24.09 kg/m        GENERAL: Alert and oriented to time place and person. Seated comfortably. In no distress.    HEAD: Atraumatic and normocephalic.    EYES: MENG, EOMI. No pallor. No icterus.    Oral cavity: no mucosal lesion or tonsillar enlargement.    NECK: supple. JVP normal.No thyroid enlargement.    LYMPH NODES: No palpable, cervical, axillary or inguinal lymphadenopathy.    CHEST: clear to auscultation bilaterally. Symmetrical breath movements bilaterally.    CVS: S1 and S2 are Regular rate and rhythm. No murmur or gallop or rub heard. No peripheral edema.    ABDOMEN: Soft. Not tender. Not distended. No palpable hepatomegaly or splenomegaly. No other mass palpable. Bowel sounds heard.    EXTREMITIES: Warm.  Minimal arthritic changes.    NEUROLOGICAL: Alert awake oriented.  Otherwise intact.  Cranial nerves appears to be preserved.    SKIN: Scattered diffuse rash on her back chest and some on the thighs.  It streaky and almost looks like that she has scratched her back but some of it is without even scratching.  Maculopapular in nature.      Laboratory data      Recent Results (from the past 168 hour(s))   Comprehensive metabolic panel   Result Value Ref Range    Sodium 132 (L) 136 - 145 mmol/L    Potassium 3.8 3.4 - 5.3 mmol/L    Chloride 95 (L) 98 - 107 mmol/L    Carbon Dioxide (CO2) 28 22 - 29 mmol/L    Anion Gap 9 7 - 15 mmol/L    Urea Nitrogen 13.7 8.0 - 23.0 mg/dL    Creatinine 0.59 0.51 - 0.95 mg/dL    Calcium 8.7 (L) 8.8 - 10.2 mg/dL    Glucose 113 (H) 70 - 99 mg/dL    Alkaline Phosphatase 63 35 - 104 U/L    AST 17 10 - 35 U/L    ALT 29 10 - 35 U/L    Protein Total 5.8 (L) 6.4 - 8.3 g/dL    Albumin 3.3 (L) 3.5 - 5.2 g/dL    " Bilirubin Total 0.8 <=1.2 mg/dL    GFR Estimate >90 >60 mL/min/1.73m2   TSH with free T4 reflex   Result Value Ref Range    TSH 1.03 0.30 - 4.20 uIU/mL   CBC with platelets and differential   Result Value Ref Range    WBC Count 22.5 (H) 4.0 - 11.0 10e3/uL    RBC Count 5.09 3.80 - 5.20 10e6/uL    Hemoglobin 14.0 11.7 - 15.7 g/dL    Hematocrit 44.3 35.0 - 47.0 %    MCV 87 78 - 100 fL    MCH 27.5 26.5 - 33.0 pg    MCHC 31.6 31.5 - 36.5 g/dL    RDW 14.4 10.0 - 15.0 %    Platelet Count 139 (L) 150 - 450 10e3/uL    % Neutrophils 82 %    % Lymphocytes 7 %    % Monocytes 8 %    % Eosinophils 0 %    % Basophils 0 %    % Immature Granulocytes 3 %    NRBCs per 100 WBC 0 <1 /100    Absolute Neutrophils 18.8 (H) 1.6 - 8.3 10e3/uL    Absolute Lymphocytes 1.6 0.8 - 5.3 10e3/uL    Absolute Monocytes 1.9 (H) 0.0 - 1.3 10e3/uL    Absolute Eosinophils 0.1 0.0 - 0.7 10e3/uL    Absolute Basophils 0.1 0.0 - 0.2 10e3/uL    Absolute Immature Granulocytes 0.6 (H) <=0.4 10e3/uL    Absolute NRBCs 0.0 10e3/uL   Basic metabolic panel   Result Value Ref Range    Sodium 131 (L) 136 - 145 mmol/L    Potassium 4.1 3.4 - 5.3 mmol/L    Chloride 94 (L) 98 - 107 mmol/L    Carbon Dioxide (CO2) 26 22 - 29 mmol/L    Anion Gap 11 7 - 15 mmol/L    Urea Nitrogen 13.2 8.0 - 23.0 mg/dL    Creatinine 0.55 0.51 - 0.95 mg/dL    Calcium 8.3 (L) 8.8 - 10.2 mg/dL    Glucose 70 70 - 99 mg/dL    GFR Estimate >90 >60 mL/min/1.73m2   Hepatic function panel   Result Value Ref Range    Protein Total 5.1 (L) 6.4 - 8.3 g/dL    Albumin 3.0 (L) 3.5 - 5.2 g/dL    Bilirubin Total 1.4 (H) <=1.2 mg/dL    Alkaline Phosphatase 57 35 - 104 U/L    AST 14 10 - 35 U/L    ALT 17 10 - 35 U/L    Bilirubin Direct 0.34 (H) 0.00 - 0.30 mg/dL   Lactic acid whole blood   Result Value Ref Range    Lactic Acid 1.1 0.7 - 2.0 mmol/L   CBC with platelets and differential   Result Value Ref Range    WBC Count 9.3 4.0 - 11.0 10e3/uL    RBC Count 4.69 3.80 - 5.20 10e6/uL    Hemoglobin 13.4 11.7 - 15.7  g/dL    Hematocrit 40.5 35.0 - 47.0 %    MCV 86 78 - 100 fL    MCH 28.6 26.5 - 33.0 pg    MCHC 33.1 31.5 - 36.5 g/dL    RDW 14.6 10.0 - 15.0 %    Platelet Count 49 (LL) 150 - 450 10e3/uL    % Neutrophils 87 %    % Lymphocytes 9 %    % Monocytes 0 %    % Eosinophils 2 %    % Basophils 0 %    % Immature Granulocytes 2 %    NRBCs per 100 WBC 0 <1 /100    Absolute Neutrophils 8.2 1.6 - 8.3 10e3/uL    Absolute Lymphocytes 0.9 0.8 - 5.3 10e3/uL    Absolute Monocytes 0.0 0.0 - 1.3 10e3/uL    Absolute Eosinophils 0.2 0.0 - 0.7 10e3/uL    Absolute Basophils 0.0 0.0 - 0.2 10e3/uL    Absolute Immature Granulocytes 0.2 <=0.4 10e3/uL    Absolute NRBCs 0.0 10e3/uL   Asymptomatic COVID-19 Virus (Coronavirus) by PCR Nasopharyngeal    Specimen: Nasopharyngeal; Swab   Result Value Ref Range    SARS CoV2 PCR Negative Negative   ECG 12-LEAD WITH MUSE (LHE)   Result Value Ref Range    Systolic Blood Pressure  mmHg    Diastolic Blood Pressure  mmHg    Ventricular Rate 87 BPM    Atrial Rate 87 BPM    CA Interval 154 ms    QRS Duration 68 ms     ms    QTc 430 ms    P Axis 61 degrees    R AXIS -53 degrees    T Axis 57 degrees    Interpretation ECG       Sinus rhythm  Left axis deviation  Abnormal ECG  When compared with ECG of 29-SEP-2022 20:40,  Premature atrial complexes are no longer Present  Confirmed by KEY CASTRO NOMAN LOC:JN ([id number]) on 11/20/2022 1:49:31 PM     Basic metabolic panel   Result Value Ref Range    Sodium 134 (L) 136 - 145 mmol/L    Potassium 4.2 3.4 - 5.3 mmol/L    Chloride 96 (L) 98 - 107 mmol/L    Carbon Dioxide (CO2) 25 22 - 29 mmol/L    Anion Gap 13 7 - 15 mmol/L    Urea Nitrogen 14.1 8.0 - 23.0 mg/dL    Creatinine 0.55 0.51 - 0.95 mg/dL    Calcium 8.0 (L) 8.8 - 10.2 mg/dL    Glucose 56 (L) 70 - 99 mg/dL    GFR Estimate >90 >60 mL/min/1.73m2   CBC with platelets and differential   Result Value Ref Range    WBC Count 4.5 4.0 - 11.0 10e3/uL    RBC Count 4.63 3.80 - 5.20 10e6/uL    Hemoglobin 13.0 11.7 - 15.7  g/dL    Hematocrit 41.9 35.0 - 47.0 %    MCV 91 78 - 100 fL    MCH 28.1 26.5 - 33.0 pg    MCHC 31.0 (L) 31.5 - 36.5 g/dL    RDW 14.6 10.0 - 15.0 %    Platelet Count 37 (LL) 150 - 450 10e3/uL    % Neutrophils 83 %    % Lymphocytes 12 %    % Monocytes 1 %    % Eosinophils 2 %    % Basophils 0 %    % Immature Granulocytes 2 %    NRBCs per 100 WBC 0 <1 /100    Absolute Neutrophils 3.8 1.6 - 8.3 10e3/uL    Absolute Lymphocytes 0.5 (L) 0.8 - 5.3 10e3/uL    Absolute Monocytes 0.0 0.0 - 1.3 10e3/uL    Absolute Eosinophils 0.1 0.0 - 0.7 10e3/uL    Absolute Basophils 0.0 0.0 - 0.2 10e3/uL    Absolute Immature Granulocytes 0.1 <=0.4 10e3/uL    Absolute NRBCs 0.0 10e3/uL   Glucose by meter   Result Value Ref Range    GLUCOSE BY METER POCT 61 (L) 70 - 99 mg/dL   Glucose by meter   Result Value Ref Range    GLUCOSE BY METER POCT 195 (H) 70 - 99 mg/dL       Imaging results        US Biopsy Lymph Node Core    Result Date: 10/28/2022  EXAM: 1. PERCUTANEOUS BIOPSY RIGHT SUPRACLAVICULAR LYMPH NODE 2. ULTRASOUND GUIDANCE LOCATION: [facility] DATE/TIME: 10/28/2022 3:48 PM INDICATION:  Lung mass and supraclavicular adenopathy. PROCEDURE: Informed consent obtained. Site marked. Prior images reviewed. Required items made available. Patient identity was confirmed verbally and with arm band. Patient reevaluated immediately before administering sedation. Universal protocol was followed. Time out performed. The site was prepped and draped in sterile fashion. 8 mL of 1 percent lidocaine was infused into the local soft tissues. Using standard technique and under direct ultrasound guidance, a 20 gauge biopsy needle was used to make five core biopsies. Tissue was submitted to Pathology. The patient tolerated the procedure well. No complications. SEDATION: No sedation given.     IMPRESSION: Status post US-guided biopsy right supraclavicular adenopathy. Reference CPT Code: 92680, 02180    XR Chest Port 1 View    Result Date:  11/19/2022  EXAM: XR CHEST PORT 1 VIEW LOCATION: [facility] DATE/TIME: 11/19/2022 6:47 PM INDICATION: Shortness of breath. COMPARISON: 10/02/2022.     IMPRESSION: Unchanged right lung base opacity and moderate size right pleural effusion. Diffuse interstitial changes throughout the right lung are also similar to prior, suspicious for a lymphangitic carcinomatosis. However, infectious/inflammatory infiltrates and asymmetric edema are difficult to exclude. Left lung is clear. No left pleural effusion. No pneumothorax at either lung. Cardiomediastinal silhouette is difficult to evaluate, given the right lung base opacity. Atherosclerotic calcifications involve the thoracic aorta. There is a new right chest wall port catheter, tip overlying the distal superior vena cava.    MR Brain w/o & w Contrast    Result Date: 11/11/2022  EXAM: MR BRAIN W/O and W CONTRAST LOCATION: [facility] DATE/TIME: 11/11/2022 4:54 PM INDICATION: Lung cancer staging. COMPARISON: None. CONTRAST: 6ml gadavist TECHNIQUE: Routine multiplanar multisequence head MRI without and with intravenous contrast. FINDINGS: INTRACRANIAL CONTENTS: A few subtle foci of mild restricted diffusion within the right parietal lobe (series 6.2 image 17), right parietal occipital junction without associated enhancement (series 6.2 image 14), and in the right precentral gyrus (series 14.2 image 14). The latter two lesions demonstrate faint associated enhancement (series 5003.4 image 39 and series 17 image 58, respectively). Associated FLAIR hyperintensity. An additional focus of enhancement without associated restricted diffusion in the right occipital lobe (series 5003.4 image 24). No associated edema or mass effect. No acute intracranial hemorrhage or extra-axial fluid collection. 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 Additional patchy and confluent nonspecific T2/FLAIR hyperintensities  within the cerebral white matter most consistent with moderate chronic microvascular ischemic change. Mild generalized cerebral parenchymal volume loss. No acute hydrocephalus. Normal position of the cerebellar tonsils. SELLA: No abnormality accounting for technique. OSSEOUS STRUCTURES/SOFT TISSUES: No suspicious bone marrow signal intensity. The major intracranial arterial flow voids are maintained. Partial loss of right sigmoid sinus and internal jugular vein flow voids, likely due to slow flow. ORBITS: No abnormality accounting for technique. SINUSES/MASTOIDS: Left sphenoid sinus mucosal thickening/secretions. No mastoid effusion.     IMPRESSION: 1.  A few punctate supratentorial foci of enhancement within the right cerebral hemisphere, concerning for intracranial metastases in this patient with known lung cancer. Alternatively, these may represent enhancement associated with subacute infarcts given associated mild restricted diffusion. Short-term follow-up with MRI brain without and with IV contrast is recommended to evaluate interval change. 2.  No associated mass effect or edema. 3.  Additional chronic findings, as above.    IR Chest Port Placement > 5 Yrs of Age    Result Date: 11/11/2022  [place] [facility] LOCATION: [facility] DATE: 11/11/2022 PROCEDURE: 1.  CENTRAL VENOUS PORT PLACEMENT 2.  FLUOROSCOPIC GUIDANCE FOR CATHETER PLACEMENT 3.  ULTRASOUND GUIDANCE FOR VASCULAR ACCESS 4.  CONSCIOUS SEDATION INTERVENTIONAL RADIOLOGIST: Arjun Cunningham MD. INDICATION: Right lung cancer, port for chemotherapy. SEDATION: Versed 0.5 mg. Fentanyl 100 mcg. The procedure was performed with administration intravenous conscious sedation with appropriate preoperative, intraoperative, and postoperative evaluation. During the time-out, immediately prior to administration of medications, the patient was reassessed for adequacy to receive conscious sedation. 14 minutes of supervised face to face  conscious sedation time was provided by a radiology nurse under my direct supervision. ANTIBIOTICS: Ancef 2 gram IV. Air Kerma: 4 mGy FLUOROSCOPIC TIME: 0.4 minutes CONTRAST: None. COMPLICATIONS: No immediate complications. STERILE BARRIER TECHNIQUE: Maximum sterile barrier technique was used. Cutaneous antisepsis was performed at the operative site with application of 2% chlorhexidine and large sterile drape. Prior to the procedure, the surgeon and assistant performed hand  hygiene and wore hat, mask, sterile gown, and sterile gloves during the entire procedure. PROCEDURE: Ultrasound demonstrated a patent and compressible right internal  jugular vein, and an ultrasound image was obtained and placed in the patient's permanent medical record. The right neck and chest were prepped and draped in usual sterile fashion. Using real-time ultrasound guidance, the right internal jugular vein was accessed. A subcutaneous pocket was created and irrigated with sterile normal saline. The catheter was tunneled in an antegrade fashion. Over the guidewire, a peel-away sheath was advanced with fluoroscopic monitoring. Through the peel-away sheath, the catheter was advanced until the tip was at the cavoatrial junction. The catheter was cut to length and attached firmly to the port. The incisions were closed with layered absorbable  suture and surgical glue. FINDINGS: Ultrasound shows an anechoic and compressible jugular vein. At the completion of the study, the port tip lies at the cavoatrial junction. TYPE OF PORT:  8 [employer] Bard PowerPort     IMPRESSION: Successful power-injectable venous port placement. CPT codes for physician reference only: 49859 [id number]    Soft tissue neck CT w contrast    Result Date: 11/19/2022  EXAM: CT SOFT TISSUE NECK W CONTRAST LOCATION: [facility] DATE/TIME: 11/19/2022 5:29 PM INDICATION: Pain on right side. COMPARISON: None. CONTRAST: Isovue 370 100 ml. TECHNIQUE: Routine CT  Soft Tissue Neck with IV contrast. Multiplanar reformats. Dose reduction techniques were used. FINDINGS: MUCOSAL SPACES/SOFT TISSUES: Normal mucosal spaces of the upper aerodigestive tract. No mucosal mass or inflammation identified. Normal infraglottic trachea. The vocal apparatus is adducted and difficult to assess. No definite enhancing mass lesions identified. Normal parapharyngeal space and subcutaneous soft tissues. No enhancing oral cavity mass lesions. Normal floor of mouth structures. Normal  spaces. Scattered endodontal and periodontal disease. LYMPH NODES: No pathologic lymph nodes by size or morphology criteria. SALIVARY GLANDS: Normal parotid and submandibular glands. THYROID: Hypoplastic/atrophic left lobe of the thyroid. Otherwise normal thyroid gland. VESSELS: The arterial vascular structures of the neck appear patent. There is a right-sided portacatheter involving the right internal jugular vein and extending into the right brachiocephalic vein. Above the level of the portacatheter within the right internal jugular vein there is occlusive thrombus which tapers and dissipates at the level of the superior aspect of the right thyroid cartilage. Below the level of the catheter insertion site, there is no contrast opacifying the right internal jugular vein. VISUALIZED INTRACRANIAL/ORBITS/SINUSES: No abnormality of the visualized intracranial compartment or orbits. Visualized paranasal sinuses and mastoid air cells are clear. OTHER: No destructive osseous lesion. Right-sided pleural effusion with right apical intralobular septal thickening.     IMPRESSION: 1.  Right sided internal jugular vein thrombus. 2.  Right-sided pleural effusion with right upper lobe intralobular septal thickening. Dr. Sim Zhang discussed results with DR. Nery Jimenez on 11/19/2022 at 5:53 PM.     PET CT images reviewed.      This note has been dictated using voice recognition software. Any grammatical or  context distortions are unintentional and inherent to the software      Signed by: Randall Santamaria MD, MD

## 2022-11-20 NOTE — PHARMACY-ADMISSION MEDICATION HISTORY
Pharmacy Note - Admission Medication History    Pertinent Provider Information: patient stated she only uses DuoNebs PRN-has recent Rx for scheduled     ______________________________________________________________________    Prior To Admission (PTA) med list completed and updated in EMR.       PTA Med List   Medication Sig Last Dose     albuterol (PROAIR HFA/PROVENTIL HFA/VENTOLIN HFA) 108 (90 Base) MCG/ACT inhaler Inhale 2 puffs into the lungs every 4 hours as needed for shortness of breath / dyspnea or wheezing Past Week     budesonide (PULMICORT) 0.5 MG/2ML neb solution Take 2 mLs (0.5 mg) by nebulization 2 times daily 11/18/2022     dexamethasone (DECADRON) 4 MG tablet Take 1 tablet (4 mg) by mouth 2 times daily (with meals) Start one day prior to Pemetrexed (Alimta), continue on the day of treatment, and the day following Pemetrexed. 11/14/2022     ipratropium - albuterol 0.5 mg/2.5 mg/3 mL (DUONEB) 0.5-2.5 (3) MG/3ML neb solution Take 1 vial (3 mLs) by nebulization every 6 hours as needed for shortness of breath / dyspnea or wheezing Unknown     ondansetron (ZOFRAN) 8 MG tablet Take 1 tablet (8 mg) by mouth every 8 hours as needed (Nausea/Vomiting) Unknown     prochlorperazine (COMPAZINE) 10 MG tablet Take 1 tablet (10 mg) by mouth every 6 hours as needed (Nausea/Vomiting) Unknown       Information source(s): Patient and Clinic records  Method of interview communication: in-person    Summary of Changes to PTA Med List  New: none  Discontinued: diazepam, dexamethasone unassociated with chemo (rx completed last weekend) duplicate albuterol  Changed: none    Patient was asked about OTC/herbal products specifically.  PTA med list reflects this.    In the past week, patient estimated taking medication this percent of the time:  greater than 90%.    Allergies were reviewed, assessed, and updated with the patient.      Patient did not bring any medications to the hospital and can't retrieve from home. No multi-dose  medications are available for use during hospital stay.     The information provided in this note is only as accurate as the sources available at the time of the update(s).    Thank you for the opportunity to participate in the care of this patient.    Sadaf Perdomo RPH  11/19/2022 6:55 PM

## 2022-11-20 NOTE — PLAN OF CARE
PRIMARY DIAGNOSIS: ACUTE PAIN  OUTPATIENT/OBSERVATION GOALS TO BE MET BEFORE DISCHARGE:  1. Pain Status: Improved but still requiring IV narcotics.    2. Return to near baseline physical activity: Yes    3. Cleared for discharge by consultants (if involved): N/A    Discharge Planner Nurse   Safe discharge environment identified: Yes  Barriers to discharge: Yes       Entered by: Carmenza Chen RN 11/20/2022 2:56 AM   Pt reports pain with swallowing.  Wants to be left alone to rest throughout night.  Repositioned and door closed for quiet environment.    Carmenza Chen RN    Please review provider order for any additional goals.   Nurse to notify provider when observation goals have been met and patient is ready for discharge.Goal Outcome Evaluation:

## 2022-11-20 NOTE — PROGRESS NOTES
Progress Note      Date of admission: 11/19/2022  Assessment/Plan  Nguyen Olivier is a 77 year old old female with Pmhx of lung CA, chemotherapy induced neutropenia. Recently started chemotherapy for lung cancer. Work-up also showed new IJ thrombus.      Severe dysphagia possibly secondary to oropharyngeal candidiasis  -Has mouth pain, difficulty in swallowing with odynophagia . CT neck unrevealing for neck mass.    Possibly due to mucositis/Candida.    Has oral thrush  continue scheduled nystatin and Magic mouthwash.    -Unable to have adequate adequate oral intake due to pain.   -Start maintenance IV fluid  -Oncology to see     Intractable pain   IV narcotic, palliative care to see for symptom management     Lung cancer  -recently started chemotherapy  - Oncology consult pending    Right IJ vein thrombus   CT imaging.  Has received 2 doses of Lovenox since last night.  Awaiting oncology consult on management of internal jugular vein thrombosis in light of thrombocytopenia.    Thrombocytopenia  Likely related to chemotherapy  Came was 49 now dropped to 37     Chronic respiratory failure, on home oxygen therapy,   chest x-ray showing unchanged right base opacity and moderate effusion.  Oncology consulted     Full code     DVT PPX : lovenox     Barriers to discharge: severe odynophagia    Anticipated Discharge date  : 3-5 days    Subjective  No distress noted.  On 4 L oxygen via nasal cannula.  Patient uses home oxygen at baseline.  Patient's main concern is dysphagia and inability to take adequate oral intake.  Has oral thrush with odynophagia.  Management plan discussed with the patient and she expressed understanding.    Objective  Vital signs in last 24 hours  Temp:  [98.3  F (36.8  C)-99.7  F (37.6  C)] 99.1  F (37.3  C)  Pulse:  [] 92  Resp:  [18-40] 18  BP: ()/(50-60) 101/54  SpO2:  [91 %-99 %] 92 %    Input and Output in 24 hrs     Intake/Output Summary (Last 24 hours) at 11/20/2022  1207  Last data filed at 11/19/2022 1550  Gross per 24 hour   Intake 1000 ml   Output --   Net 1000 ml       Physical Exam:  GEN: Alert and oriented. Not in acute distress  HEENT: Mucous membrane- moist and pink, lost tooth   CHEST: Clear to auscultation bilaterally  HEART: S1S2 regular.   ABDOMEN: Soft. Non-tender, non-distended. No organomegaly.  Bowel sounds- active  Extremities: No pedal oedema  CNS: No focal neurological deficit. No involuntary movements  SKIN: No skin rash, no cyanosis or clubbing      Pertinent Labs   Lab Results: Personally Reviewed    Recent Results (from the past 24 hour(s))   Basic metabolic panel    Collection Time: 11/19/22  1:25 PM   Result Value Ref Range    Sodium 131 (L) 136 - 145 mmol/L    Potassium 4.1 3.4 - 5.3 mmol/L    Chloride 94 (L) 98 - 107 mmol/L    Carbon Dioxide (CO2) 26 22 - 29 mmol/L    Anion Gap 11 7 - 15 mmol/L    Urea Nitrogen 13.2 8.0 - 23.0 mg/dL    Creatinine 0.55 0.51 - 0.95 mg/dL    Calcium 8.3 (L) 8.8 - 10.2 mg/dL    Glucose 70 70 - 99 mg/dL    GFR Estimate >90 >60 mL/min/1.73m2   Hepatic function panel    Collection Time: 11/19/22  1:25 PM   Result Value Ref Range    Protein Total 5.1 (L) 6.4 - 8.3 g/dL    Albumin 3.0 (L) 3.5 - 5.2 g/dL    Bilirubin Total 1.4 (H) <=1.2 mg/dL    Alkaline Phosphatase 57 35 - 104 U/L    AST 14 10 - 35 U/L    ALT 17 10 - 35 U/L    Bilirubin Direct 0.34 (H) 0.00 - 0.30 mg/dL   Lactic acid whole blood    Collection Time: 11/19/22  1:25 PM   Result Value Ref Range    Lactic Acid 1.1 0.7 - 2.0 mmol/L   CBC with platelets and differential    Collection Time: 11/19/22  1:25 PM   Result Value Ref Range    WBC Count 9.3 4.0 - 11.0 10e3/uL    RBC Count 4.69 3.80 - 5.20 10e6/uL    Hemoglobin 13.4 11.7 - 15.7 g/dL    Hematocrit 40.5 35.0 - 47.0 %    MCV 86 78 - 100 fL    MCH 28.6 26.5 - 33.0 pg    MCHC 33.1 31.5 - 36.5 g/dL    RDW 14.6 10.0 - 15.0 %    Platelet Count 49 (LL) 150 - 450 10e3/uL    % Neutrophils 87 %    % Lymphocytes 9 %    %  Monocytes 0 %    % Eosinophils 2 %    % Basophils 0 %    % Immature Granulocytes 2 %    NRBCs per 100 WBC 0 <1 /100    Absolute Neutrophils 8.2 1.6 - 8.3 10e3/uL    Absolute Lymphocytes 0.9 0.8 - 5.3 10e3/uL    Absolute Monocytes 0.0 0.0 - 1.3 10e3/uL    Absolute Eosinophils 0.2 0.0 - 0.7 10e3/uL    Absolute Basophils 0.0 0.0 - 0.2 10e3/uL    Absolute Immature Granulocytes 0.2 <=0.4 10e3/uL    Absolute NRBCs 0.0 10e3/uL   Asymptomatic COVID-19 Virus (Coronavirus) by PCR Nasopharyngeal    Collection Time: 11/19/22  6:58 PM    Specimen: Nasopharyngeal; Swab   Result Value Ref Range    SARS CoV2 PCR Negative Negative   ECG 12-LEAD WITH MUSE ([facility])    Collection Time: 11/19/22  8:35 PM   Result Value Ref Range    Systolic Blood Pressure  mmHg    Diastolic Blood Pressure  mmHg    Ventricular Rate 87 BPM    Atrial Rate 87 BPM    DC Interval 154 ms    QRS Duration 68 ms     ms    QTc 430 ms    P Axis 61 degrees    R AXIS -53 degrees    T Axis 57 degrees    Interpretation ECG       Sinus rhythm  Left axis deviation  Abnormal ECG  When compared with ECG of 29-SEP-2022 20:40,  Premature atrial complexes are no longer Present     Basic metabolic panel    Collection Time: 11/20/22  7:17 AM   Result Value Ref Range    Sodium 134 (L) 136 - 145 mmol/L    Potassium 4.2 3.4 - 5.3 mmol/L    Chloride 96 (L) 98 - 107 mmol/L    Carbon Dioxide (CO2) 25 22 - 29 mmol/L    Anion Gap 13 7 - 15 mmol/L    Urea Nitrogen 14.1 8.0 - 23.0 mg/dL    Creatinine 0.55 0.51 - 0.95 mg/dL    Calcium 8.0 (L) 8.8 - 10.2 mg/dL    Glucose 56 (L) 70 - 99 mg/dL    GFR Estimate >90 >60 mL/min/1.73m2   CBC with platelets and differential    Collection Time: 11/20/22  7:17 AM   Result Value Ref Range    WBC Count 4.5 4.0 - 11.0 10e3/uL    RBC Count 4.63 3.80 - 5.20 10e6/uL    Hemoglobin 13.0 11.7 - 15.7 g/dL    Hematocrit 41.9 35.0 - 47.0 %    MCV 91 78 - 100 fL    MCH 28.1 26.5 - 33.0 pg    MCHC 31.0 (L) 31.5 - 36.5 g/dL    RDW 14.6 10.0 - 15.0 %    Platelet  Count 37 (LL) 150 - 450 10e3/uL    % Neutrophils 83 %    % Lymphocytes 12 %    % Monocytes 1 %    % Eosinophils 2 %    % Basophils 0 %    % Immature Granulocytes 2 %    NRBCs per 100 WBC 0 <1 /100    Absolute Neutrophils 3.8 1.6 - 8.3 10e3/uL    Absolute Lymphocytes 0.5 (L) 0.8 - 5.3 10e3/uL    Absolute Monocytes 0.0 0.0 - 1.3 10e3/uL    Absolute Eosinophils 0.1 0.0 - 0.7 10e3/uL    Absolute Basophils 0.0 0.0 - 0.2 10e3/uL    Absolute Immature Granulocytes 0.1 <=0.4 10e3/uL    Absolute NRBCs 0.0 10e3/uL   Glucose by meter    Collection Time: 11/20/22 11:56 AM   Result Value Ref Range    GLUCOSE BY METER POCT 61 (L) 70 - 99 mg/dL       Pertinent Radiology   Radiology Results reviewed      EKG Results reviewed       Advanced Care Planning      Lina Diaz MD   [facility]

## 2022-11-20 NOTE — PROGRESS NOTES
"PRIMARY DIAGNOSIS: \"GENERIC\" NURSING  OUTPATIENT/OBSERVATION GOALS TO BE MET BEFORE DISCHARGE:  1. ADLs back to baseline: No    2. Activity and level of assistance: Up with standby assistance.    3. Pain status: Improved but still requiring IV narcotics.    4. Return to near baseline physical activity: No     Discharge Planner Nurse   Safe discharge environment identified: Yes  Barriers to discharge: No       Entered by: Enedelia Shaffer RN 11/19/2022 7:45 PM     Please review provider order for any additional goals.   Nurse to notify provider when observation goals have been met and patient is ready for discharge.    Pt arrived to unit about a half and hour ago from the ED. Pt has had a one time dose of IV morphine and is resting comfortable. Pt is weak and will need at least A1 with walker to the bathroom. Pt is complaining of dysphagia, sore throat and trouble swallowing. Pt has a orestes catheter in place.  On 5L nc VSS otherwise.    BP 99/54 (BP Location: Left arm)   Pulse 94   Temp 98.3  F (36.8  C) (Oral)   Resp 22   Ht 1.6 m (5' 3\")   Wt 61.7 kg (136 lb)   LMP  (LMP Unknown)   SpO2 97%   BMI 24.09 kg/m      "

## 2022-11-21 NOTE — CONSULTS
"[facility]  Palliative Care Consultation Note    Patient: Nguyen Olivier  Date of Admission:  11/19/2022    Requesting Clinician / Team: Hospitalist  Reason for consult: Symptom management  Goals of care    Impression & Recommendations:  Suma is clear that she does not want to undergo any further chemotherapy. She knows that her time will likely be shorter than she would like and she wants to be \"at peace\" during that time. Suma would like to transition to hospice care in a facility on discharge, she and family prefer [facility] hospice home if possible.    Discussed code status today. Suma would not want resuscitation or intubation, prefers to be allowed a natural death. Chuck (son) and Destinee (daughter-in-law) at bedside and in agreement with plan for DNR/DNI and hospice placement.    Goals of Care: life-prolonging with plan to transition to comfort-focused on discharge    Advanced Care Planning:  Advance directive: None  POLST: No - complete prior to discharge  Code status: DNR/DNI  Health care agent/surrogate: dominique Woods and [place]    Symptom Management:  #Dysphagia, mouth pain, oropharyngeal candidiasis  - Continue IV fluconazole  - Continue magic mouthwash before meals and nightly  - Start gelclair rinse after meals and nightly  - Start tylenol solution tid with additional available prn  - Start gabapentin 100mg solution at bedtime  - Start oral morphine solution 5mg q2h prn  - Continue IV dilaudid 0.2mg q3h prn  - Requested [facility]    #Rash, pruritis  - Possibly due to chemo  - Started calamine lotion tid and prn  - Started benadryl cream tid  - Discontinue atarax due to risk of confusion, dry mouth, and other anticholinergic effects    #Insomnia  - Gabapentin as above  - Pain management as above    Psychosocial & Spiritual:   Lives alone,  passed away on hospice years ago  Dominique Noel live nearby along with their families. Has 5 grandchildren. Had 3 " sons but one passed away in 2016.   Does not identify as spiritual or Orthodox    These recommendations have been discussed with Dr. Diaz.      Thank you for the opportunity to participate in the care of this patient and family. Our team: will continue to follow.     During regular M-F work hours -- if you are not sure who specifically to contact -- please contact us by calling us directly at the Palliative Care Main Line [phone]    After regular work hours and on weekends/holidays, you can leave a message at [phone]    Assessments:  Nguyen is a 78 y/o woman recently diagnosed with stage IV right lung cancer (lymph node involvement above and below diaphragm), now on chemo, admitted 11/19/2022 with severe dysphagia and mouth pain attributed to candidiasis. Found to have internal jugular DVT and new right sided pleural effusion.  - Recently admitted 9/29-10/4/2022 for acute hypoxic respiratory failure. Found to have advanced pneumonia and endobronchial tumor. Discharged on 4L home oxygen (new at that time).    Today, the patient was seen for:  Symptom management, goals of care    Prognosis, Goals, & Planning:      Functional Status just prior to hospitalization: Palliative Performance Score:       40%- 1. Mainly in bed; 2. Unable to do most activity, extensive disease; 3. Mainly assistance; 4. Normal or reduced; 5. Full or drowsy +/- confusion      Prognosis, Goals, and/or Advance Care Planning were addressed today: Yes      Patient's decision making preferences: independently          Patient has decision-making capacity today for complex decisions: Yes            I have concerns about the patient/family's health literacy today: No           Patient has a completed Health Care Directive: No.       Code status: No CPR / No Intubation    Coping, Meaning, & Spirituality:   Mood, coping, and/or meaning in the context of serious illness were addressed today: Yes    Social:     Living situation: home  "alone, family stops by to help    Francois family / caregivers: , has 2 sons (Chuck & Cleveland) nearby, 3rd son passed away in 2016    Occupational history: home ,     Current in-home services: none    History of Present Illness:  History gathered today from: patient, family/loved ones, medical chart    Suma is accompanied by her son Chuck and daughter-in-law Destinee during my visit today. She is in significant discomfort during our visit. Swallowing even a small amount of applesauce with pill is incredibly painful. Suma says that she \"would never do this again.\" I clarified that she was referring to chemotherapy, she affirmed that she does not want to undergo further chemotherapy.     Suma recalls that her oncologist said chemotherapy could give her a year or more to live but that without treatment she may only have about 4 months. Suma was willing to try chemo but after this experience she has decided this quality of life is not acceptable to her. She would prefer to spend her remaining time \"at peace\" and be as comfortable as possible. Suma tells me about her  and parents who passed away on hospice. She had a positive experience with hospice care and would like that for herself. Family is familiar with [facility] hospice and request referral there.    We discussed code status and Suma clearly indicated that she would not want CPR or intubation, she would prefer to be allowed a natural death.    Suma and her  had 3 sons but one passed away in 2016. Her sons Cleveland and Chuck and their families are a great source of aleksandr for Suma, particularly her grandchildren. Suma ran a  from home while raising her boys and later worked as a , which she says was \"the best job ever.\"     Key Palliative Symptom Data:  # Pain severity the last 12 hours: severe  # Dyspnea severity the last 12 hours: none  # Nausea severity the last 12 hours: low  # Anxiety severity the last 12 hours: not " assessed   Pruritis - severe  Insomnia - moderate    Patient is on opioids: bowels not assessed today.    ROS:  Comprehensive ROS is reviewed and is negative except as here & per HPI     Past Medical History:  Past Medical History:   Diagnosis Date     Cancer (H)     lung     Right Macular hole         Past Surgical History:  Past Surgical History:   Procedure Laterality Date     BRONCHOSCOPY FLEXIBLE AND RIGID N/A 10/1/2022    Procedure: BRONCHOSCOPY WITH BRONCHOALVEOLAR LAVAGE & BRUSHINGS;  Surgeon: Monica Johnson MD;  Location: [facility] OR      CHEST PORT PLACEMENT > 5 YRS OF AGE  11/11/2022     right vitrectomy Right          Family History:  No family history on file.      Allergies:  No Known Allergies     Medications:  I have reviewed this patient's medication profile and medications from this hospitalization.   Noted:  Current Facility-Administered Medications   Medication     albuterol (PROVENTIL HFA/VENTOLIN HFA) inhaler     apixaban ANTICOAGULANT (ELIQUIS) tablet 5 mg     budesonide (PULMICORT) neb solution 0.5 mg     dextrose 5% and 0.9% NaCl infusion     glucose gel 15-30 g    Or     dextrose 50 % injection 25-50 mL    Or     glucagon injection 1 mg     fluconazole (DIFLUCAN) intermittent infusion 200 mg     [Held by provider] heparin lock flush 10 UNIT/ML injection 5-10 mL     HYDROmorphone (DILAUDID) injection 0.2 mg     hydrOXYzine (ATARAX) tablet 10 mg     ipratropium - albuterol 0.5 mg/2.5 mg/3 mL (DUONEB) neb solution 3 mL     magic mouthwash suspension (diphenhydramine, lidocaine, aluminum-magnesium & simethicone)     naloxone (NARCAN) injection 0.2 mg    Or     naloxone (NARCAN) injection 0.4 mg    Or     naloxone (NARCAN) injection 0.2 mg    Or     naloxone (NARCAN) injection 0.4 mg     nystatin (MYCOSTATIN) suspension 500,000 Units     ondansetron (ZOFRAN) tablet 8 mg     prochlorperazine (COMPAZINE) tablet 10 mg     sodium chloride (PF) 0.9% PF flush 10-20 mL     sodium chloride (PF)  "0.9% PF flush 10-20 mL     sodium chloride (PF) 0.9% PF flush 10-20 mL       PERTINENT PHYSICAL EXAMINATION:  Vital Signs: Blood pressure 102/53, pulse 107, temperature 98.8  F (37.1  C), temperature source Oral, resp. rate 20, height 1.6 m (5' 3\"), weight 61.7 kg (136 lb), SpO2 93 %.   GENERAL: laying in bed, significant throat pain with swallowing or speaking for extended period  SKIN: +diffuse erythematous rash, concentrated over back and chest, with excoriations  HEENT: +temporal muscle wasting, +erythematous tongue and oral mucosa  LUNGS: Clear to auscultation anterolaterally; non-labored   CARDIAC: RRR  ABDOMINAL: soft, non distended, non tender  MUSKL: no gross joint deformities   NEUROLOGIC: A&Ox4  PSYCH: full affect    Data reviewed:  Recent imaging reviewed, my comments on pertinents:   CT Neck 11/19  IMPRESSION:   1.  Right sided internal jugular vein thrombus.  2.  Right-sided pleural effusion with right upper lobe intralobular septal thickening.    CXR 11/19  IMPRESSION: Unchanged right lung base opacity and moderate size right pleural effusion. Diffuse interstitial changes throughout the right lung are also similar to prior, suspicious for a lymphangitic carcinomatosis. However, infectious/inflammatory infiltrates and asymmetric edema are difficult to exclude.     Left lung is clear. No left pleural effusion. No pneumothorax at either lung.     Cardiomediastinal silhouette is difficult to evaluate, given the right lung base opacity. Atherosclerotic calcifications involve the thoracic aorta.     There is a new right chest wall port catheter, tip overlying the distal superior vena cava.    Recent lab data reviewed, my comments on pertinents:   CMP  Recent Labs   Lab 11/21/22  0544 11/20/22  1921 11/20/22  1242 11/20/22  1156 11/20/22  0717 11/19/22  1325   *  --   --   --  134* 131*   POTASSIUM 3.3*  --   --   --  4.2 4.1   CHLORIDE 99  --   --   --  96* 94*   CO2 28  --   --   --  25 26   ANIONGAP " 7  --   --   --  13 11   * 85 195* 61* 56* 70   BUN 10.0  --   --   --  14.1 13.2   CR 0.48*  --   --   --  0.55 0.55   GFRESTIMATED >90  --   --   --  >90 >90   JO 7.5*  --   --   --  8.0* 8.3*   PROTTOTAL  --   --   --   --   --  5.1*   ALBUMIN  --   --   --   --   --  3.0*   BILITOTAL  --   --   --   --   --  1.4*   ALKPHOS  --   --   --   --   --  57   AST  --   --   --   --   --  14   ALT  --   --   --   --   --  17     CBC  Recent Labs   Lab 11/21/22  0544 11/20/22  0717 11/19/22  1325   WBC 2.6* 4.5 9.3   RBC 4.12 4.63 4.69   HGB 11.6* 13.0 13.4   HCT 36.0 41.9 40.5   MCV 87 91 86   MCH 28.2 28.1 28.6   MCHC 32.2 31.0* 33.1   RDW 14.5 14.6 14.6   PLT 24* 37* 49*       TTS: I have personally spent a total of 90 minutes  today on the unit in review of medical record, consultation with the medical providers and assessment of patient today, with more than 50% of this time spent in counseling, coordination of care, and conversation in a family meeting re: diagnostic results, prognosis, symptom management, risks and benefits of management options,  emotional support and development of plan of care.     Gina Horn PA-C  [facility], Palliative Care  Dept phone: [phone]  Securely message with the Vocera Web Console

## 2022-11-21 NOTE — PLAN OF CARE
Goal Outcome Evaluation:               Pt refused to start medrol doses tonight, wants to start in am, Hem/Onc updated.  Unable to convince pt to start this all three doses this evening.  Pt reports feeling better after ointments applied to trunk area.

## 2022-11-21 NOTE — PROGRESS NOTES
SW acknowledges consult. SW attempted to meet with patient and son in room, son was not present and patient was sleeping. SW left list of hospice agencies at patient's bedside. SW left message for patient's son asking for call back. Per Palliative family is interested in [facility] but also wanting more information about their options.    Rachel Georges

## 2022-11-21 NOTE — PLAN OF CARE
"Goal Outcome Evaluation:    Pt is refusing both PO mouth wash and rinses. MD notified. Monitor blood sugars pt was hypoglycemic around 0700 on 11/20/22. Last BG at 1921 was 85. Pt reports no symptoms of hypoglycemia. Clear liquid diet with poor appetite did not eat dinner. Pt complains of a rash on her back, anterior chest, legs and right foot. MD aware. Pt on telemetry and is in sinus tachycardia to NSR. D5 NaCl running through port a cath. At 75mL/hr. On 4L NC     /60 (BP Location: Right arm)   Pulse 98   Temp 99  F (37.2  C) (Oral)   Resp 20   Ht 1.6 m (5' 3\")   Wt 61.7 kg (136 lb)   LMP  (LMP Unknown)   SpO2 94%   BMI 24.09 kg/m          Plan of Care Reviewed With: patient    Overall Patient Progress: no changeOverall Patient Progress: no change      Problem: Plan of Care - These are the overarching goals to be used throughout the patient stay.    Goal: Absence of Hospital-Acquired Illness or Injury  Intervention: Prevent Skin Injury  Recent Flowsheet Documentation  Taken 11/20/2022 1734 by Enedelia Shaffer RN  Body Position: position changed independently     Problem: Plan of Care - These are the overarching goals to be used throughout the patient stay.    Goal: Absence of Hospital-Acquired Illness or Injury  Intervention: Prevent and Manage VTE (Venous Thromboembolism) Risk  Recent Flowsheet Documentation  Taken 11/20/2022 1734 by Enedelia Shaffer RN  VTE Prevention/Management: SCDs (sequential compression devices) off     Problem: Plan of Care - These are the overarching goals to be used throughout the patient stay.    Goal: Optimal Comfort and Wellbeing  Outcome: Progressing     Problem: Risk for Delirium  Goal: Optimal Coping  Outcome: Progressing     Problem: Risk for Delirium  Goal: Improved Sleep  Outcome: Progressing     Problem: Risk for Delirium  Goal: Improved Behavioral Control  Intervention: Minimize Safety Risk  Recent Flowsheet Documentation  Taken 11/20/2022 1734 by Edmund" Enedelia MCBRIDE RN  Enhanced Safety Measures: bed alarm set

## 2022-11-21 NOTE — PROGRESS NOTES
Progress Note      Date of admission: 11/19/2022  Assessment/Plan  Nguyen Olivier is a 77 year old old female with Pmhx of lung CA, chemotherapy induced neutropenia. Recently started chemotherapy for lung cancer. Work-up also showed new IJ thrombus.      Severe dysphagia possibly secondary to oropharyngeal candidiasis  -Has mouth pain, difficulty in swallowing with odynophagia . CT neck unrevealing for neck mass.    Possibly due to mucositis/Candida.    Has oral thrush  Continue fluconazole 200 mg IV once a day and Magic mouth wash   -Unable to have adequate adequate oral intake due to pain.   -maintenance IV fluid  -Oncology consult appreciated.  Concern for possible HIT  - HIT screening test is negative  -IJ venous thrombosis is being treated by Eliquis as per recommendation from oncology.     Intractable pain   -Palliative care consult appreciated.  -Patient has expressed interest to transition to hospice care either inpatient or outpatient.  -Pain control with oral morphine solution, Tylenol and gabapentin.    Lung cancer, stage IV  -Recently started chemotherapy  -Oncology consult appreciated    Right IJ vein thrombus   CT imaging.  Eliquis started as per oncology recommendation.  -Patient would like to get her Ortho cath removed.  -She is not interested in further chemotherapy.    Thrombocytopenia  Likely related to chemotherapy  Came was 49 now dropped to 37  Concerning for possible HIT.  Screening test for HIT is negative  Continue to monitor platelet count     Chronic respiratory failure, on home oxygen therapy,   - Chest x-ray showing unchanged right base opacity and moderate effusion.  Oncology consulted  -On 4 L oxygen via nasal cannula.    Diffuse rash  -Patient has diffuse rash involving trunk and lower extremities.  She states that it is itching  -Could be related to chemotherapy or other drug reaction  -Calamine lotion     DNR/DNI     DVT PPX : lovenox     Barriers to discharge: severe  odynophagia, determination of goals of care, hospice care arrangement    Anticipated Discharge date  : 1 to 2 days    Subjective  No distress noted.  On 4 L oxygen via nasal cannula.  Patient uses home oxygen at baseline.  Patient's main concern is dysphagia and inability to take adequate oral intake.  Has oral thrush with odynophagia.  Management plan discussed with the patient and she expressed understanding.    Objective  Vital signs in last 24 hours  Temp:  [98.8  F (37.1  C)-99.8  F (37.7  C)] 98.8  F (37.1  C)  Pulse:  [] 111  Resp:  [18-20] 20  BP: ()/(53-60) 105/55  SpO2:  [90 %-95 %] 93 %    Input and Output in 24 hrs     Intake/Output Summary (Last 24 hours) at 11/20/2022 1207  Last data filed at 11/19/2022 1550  Gross per 24 hour   Intake 1000 ml   Output --   Net 1000 ml       Physical Exam:  GEN: Alert and oriented. Not in acute distress  HEENT: Mucous membrane- moist and pink, lost tooth , has oral thrush  CHEST: Clear to auscultation bilaterally  HEART: S1S2 regular.   ABDOMEN: Soft. Non-tender, non-distended. No organomegaly.  Bowel sounds- active  Extremities: No pedal oedema  CNS: No focal neurological deficit. No involuntary movements  SKIN: Patient has diffuse rash on trunk and lower extremities which appears to be petechiael and pruritic.    Pertinent Labs   Lab Results: Personally Reviewed    Recent Results (from the past 24 hour(s))   Glucose by meter    Collection Time: 11/20/22 11:56 AM   Result Value Ref Range    GLUCOSE BY METER POCT 61 (L) 70 - 99 mg/dL   Glucose by meter    Collection Time: 11/20/22 12:42 PM   Result Value Ref Range    GLUCOSE BY METER POCT 195 (H) 70 - 99 mg/dL   Glucose by meter    Collection Time: 11/20/22  7:21 PM   Result Value Ref Range    GLUCOSE BY METER POCT 85 70 - 99 mg/dL   CBC with platelets    Collection Time: 11/21/22  5:44 AM   Result Value Ref Range    WBC Count 2.6 (L) 4.0 - 11.0 10e3/uL    RBC Count 4.12 3.80 - 5.20 10e6/uL    Hemoglobin  11.6 (L) 11.7 - 15.7 g/dL    Hematocrit 36.0 35.0 - 47.0 %    MCV 87 78 - 100 fL    MCH 28.2 26.5 - 33.0 pg    MCHC 32.2 31.5 - 36.5 g/dL    RDW 14.5 10.0 - 15.0 %    Platelet Count 24 (LL) 150 - 450 10e3/uL   Basic metabolic panel    Collection Time: 11/21/22  5:44 AM   Result Value Ref Range    Sodium 134 (L) 136 - 145 mmol/L    Potassium 3.3 (L) 3.4 - 5.3 mmol/L    Chloride 99 98 - 107 mmol/L    Carbon Dioxide (CO2) 28 22 - 29 mmol/L    Anion Gap 7 7 - 15 mmol/L    Urea Nitrogen 10.0 8.0 - 23.0 mg/dL    Creatinine 0.48 (L) 0.51 - 0.95 mg/dL    Calcium 7.5 (L) 8.8 - 10.2 mg/dL    Glucose 134 (H) 70 - 99 mg/dL    GFR Estimate >90 >60 mL/min/1.73m2       Pertinent Radiology   Radiology Results reviewed      EKG Results reviewed       Advanced Care Planning      Lina Diaz MD   [facility]

## 2022-11-21 NOTE — PROGRESS NOTES
[facility] Hematology and Oncology Inpatient Progress Note    Patient: Nguyen Olivier  MRN: [id number]  Date of Service: November 21, 2022         Reason for Visit    Metastatic lung cancer    Assessment and Plan    Cancer Staging   Malignant neoplasm of lower lobe of right lung (H)  Staging form: Lung, AJCC 8th Edition  - Clinical: Stage IVB (cT3, cN3, cM1c) - Unsigned    1.  Metastatic lung cancer: Patient started palliative chemotherapy last week.  She is having fairly severe side effects and states that at this point she is not sure that she would want to do anymore therapy.  She states if its not a curable cancer she does not want to live with the side effects.  I did tell her that we could probably reduce the dose and try to get her to tolerate it better but at this point she does not feel that she would want to do that and is thinking about doing more supportive cares like hospice.  Her family and her are currently talking and will be meeting with them.  We will continue to follow along to see if she changes her mind.    2.  Mucositis, esophagitis: Patient states that she does not think the Magic mouthwash helped.  I encouraged her to try it more.  I encouraged her to swallow a small amount of it to see if will help her throat.  We will try Cepacol lozenges as well.  She is currently in her roni.  And things will improve over the next couple of days.  Will likely need IV Dilaudid as well. Pharmacy does not have Gelclair.  Continue fluconazole.    3.  Pancytopenia: Patient is on day 8 of her chemotherapy with carbo, Alimta and Keytruda.  Her counts are low because of the chemo.  They generally are low roughly on day 7 through 10.  She likely will stay low for a couple or more days.  Keep her platelets above 10, hemoglobin above 7.    4.  Rash, likely immunotherapy induced: will start medrol dose pack to help with this.     ECOG Performance Status:  3      ______________________________________________________________________________    History of Present Illness    Ms. Nguyen Olivier is a 77 year old woman who has been admitted with dysphagia that has been going on for the past few days and it became somewhat unbearable so she came to the emergency room on 19 November 2022.  She was seen in the emergency room and had a CT scan of the neck done.  The CT scan of the neck showed that she had right internal jugular vein thrombosis.  This is close to where she had the Port-A-Cath placed.     She has recently been diagnosed with adenocarcinoma located in the right lung measuring several centimeters in size presenting with some shortness of breath and cough symptoms.  Her biopsy has been consistent with non-small cell carcinoma adenocarcinoma.  PET scan showed that she had besides lung and pleural involvement lymph nodes both above and below the diaphragm.  There was no significant lymphadenopathy noted in the neck however.  He did start on palliative chemotherapy with immunotherapy last week.  She received carboplatin, Alimta at 25% reduction and Keytruda at full dose.     She has been started on some anticoagulation with Lovenox for her thrombosis.  Her platelet count has dropped so there is no other concern about that.  She is also been started on antifungal agents for her oropharyngeal candidiasis which is most likely causing her pain.     Patient states that she continues to feel pretty miserable.  She says she has a rash and she is itching all over.  Her back is the worst but its all over.  She also says she has a lot of pain in her throat and cannot swallow very easily.  She tried taking some of the mouthwashes this morning and said it was too painful to swallow.  She is not really eating and drinking much because of it.  No bleeding complications.  She is very weak.  She states that she never wants to have chemotherapy again because of how she feels.   "She denies any shaking or chills.  Denies any new bone or back pain.    Review of Systems    ROS: As above in the history, otherwise the remainder of the 10point ROS is negative and not contributory to current reason for visit.       PHYSICAL EXAM:  /55 (BP Location: Right arm)   Pulse 105   Temp 99.7  F (37.6  C) (Oral)   Resp 20   Ht 1.6 m (5' 3\")   Wt 61.7 kg (136 lb)   LMP  (LMP Unknown)   SpO2 93%   BMI 24.09 kg/m    GENERAL: no acute distress. Cooperative in conversation.  Resting in bed.  HEENT: pupils are equal, round and reactive.  Poor dentition.  Her mucous membranes are dry and pink.  She does have some oral thrush.  No significant mucositis.  RESP: lungs are clear bilaterally per auscultation. Regular respiratory rate. No wheezes or rhonchi.  CV: Regular, rate and rhythm. No murmurs.  ABD: soft, nontender.   MUSCULOSKELETAL: No lower extremity swelling.   NEURO: non focal. Alert and oriented x3.   PSYCH: within normal limits. No depression or anxiety.  SKIN: warm dry intact, patient has diffuse rash on trunk, legs and arms.  It is pruritic and she has excoriations.          Lab Results    Recent Results (from the past 24 hour(s))   Heparin Induced Thrombocytopenia Screen    Collection Time: 11/20/22  5:07 PM   Result Value Ref Range    HIT Screen Negative Negative   Glucose by meter    Collection Time: 11/20/22  7:21 PM   Result Value Ref Range    GLUCOSE BY METER POCT 85 70 - 99 mg/dL   CBC with platelets    Collection Time: 11/21/22  5:44 AM   Result Value Ref Range    WBC Count 2.6 (L) 4.0 - 11.0 10e3/uL    RBC Count 4.12 3.80 - 5.20 10e6/uL    Hemoglobin 11.6 (L) 11.7 - 15.7 g/dL    Hematocrit 36.0 35.0 - 47.0 %    MCV 87 78 - 100 fL    MCH 28.2 26.5 - 33.0 pg    MCHC 32.2 31.5 - 36.5 g/dL    RDW 14.5 10.0 - 15.0 %    Platelet Count 24 (LL) 150 - 450 10e3/uL   Basic metabolic panel    Collection Time: 11/21/22  5:44 AM   Result Value Ref Range    Sodium 134 (L) 136 - 145 mmol/L    " Potassium 3.3 (L) 3.4 - 5.3 mmol/L    Chloride 99 98 - 107 mmol/L    Carbon Dioxide (CO2) 28 22 - 29 mmol/L    Anion Gap 7 7 - 15 mmol/L    Urea Nitrogen 10.0 8.0 - 23.0 mg/dL    Creatinine 0.48 (L) 0.51 - 0.95 mg/dL    Calcium 7.5 (L) 8.8 - 10.2 mg/dL    Glucose 134 (H) 70 - 99 mg/dL    GFR Estimate >90 >60 mL/min/1.73m2        Imaging    XR Chest Port 1 View    Result Date: 11/19/2022  EXAM: XR CHEST PORT 1 VIEW LOCATION: [facility] DATE/TIME: 11/19/2022 6:47 PM INDICATION: Shortness of breath. COMPARISON: 10/02/2022.     IMPRESSION: Unchanged right lung base opacity and moderate size right pleural effusion. Diffuse interstitial changes throughout the right lung are also similar to prior, suspicious for a lymphangitic carcinomatosis. However, infectious/inflammatory infiltrates and asymmetric edema are difficult to exclude. Left lung is clear. No left pleural effusion. No pneumothorax at either lung. Cardiomediastinal silhouette is difficult to evaluate, given the right lung base opacity. Atherosclerotic calcifications involve the thoracic aorta. There is a new right chest wall port catheter, tip overlying the distal superior vena cava.    Soft tissue neck CT w contrast    Result Date: 11/19/2022  EXAM: CT SOFT TISSUE NECK W CONTRAST LOCATION: [facility] DATE/TIME: 11/19/2022 5:29 PM INDICATION: Pain on right side. COMPARISON: None. CONTRAST: Isovue 370 100 ml. TECHNIQUE: Routine CT Soft Tissue Neck with IV contrast. Multiplanar reformats. Dose reduction techniques were used. FINDINGS: MUCOSAL SPACES/SOFT TISSUES: Normal mucosal spaces of the upper aerodigestive tract. No mucosal mass or inflammation identified. Normal infraglottic trachea. The vocal apparatus is adducted and difficult to assess. No definite enhancing mass lesions identified. Normal parapharyngeal space and subcutaneous soft tissues. No enhancing oral cavity mass lesions. Normal floor of mouth  structures. Normal  spaces. Scattered endodontal and periodontal disease. LYMPH NODES: No pathologic lymph nodes by size or morphology criteria. SALIVARY GLANDS: Normal parotid and submandibular glands. THYROID: Hypoplastic/atrophic left lobe of the thyroid. Otherwise normal thyroid gland. VESSELS: The arterial vascular structures of the neck appear patent. There is a right-sided portacatheter involving the right internal jugular vein and extending into the right brachiocephalic vein. Above the level of the portacatheter within the right internal jugular vein there is occlusive thrombus which tapers and dissipates at the level of the superior aspect of the right thyroid cartilage. Below the level of the catheter insertion site, there is no contrast opacifying the right internal jugular vein. VISUALIZED INTRACRANIAL/ORBITS/SINUSES: No abnormality of the visualized intracranial compartment or orbits. Visualized paranasal sinuses and mastoid air cells are clear. OTHER: No destructive osseous lesion. Right-sided pleural effusion with right apical intralobular septal thickening.     IMPRESSION: 1.  Right sided internal jugular vein thrombus. 2.  Right-sided pleural effusion with right upper lobe intralobular septal thickening. Dr. Sim Zhang discussed results with DR. Nery Jimenez on 11/19/2022 at 5:53 PM.        Signed by: LUIS E Brunner CNP

## 2022-11-21 NOTE — PLAN OF CARE
Goal Outcome Evaluation:      Problem: Plan of Care - These are the overarching goals to be used throughout the patient stay.    Goal: Optimal Comfort and Wellbeing  Outcome: Progressing  Patient very uncomfortable at the beginning of the shift d/t body rash. PRN med order obtained, lotion applied. Patient expressed relief after interventions     Problem: Risk for Delirium  Goal: Improved Sleep  Outcome: Progressing  Patient reports that she was able to sleep after above interventions.

## 2022-11-22 NOTE — PLAN OF CARE
"  Problem: Plan of Care - These are the overarching goals to be used throughout the patient stay.    Goal: Absence of Hospital-Acquired Illness or Injury  Intervention: Prevent Skin Injury  Recent Flowsheet Documentation  Taken 11/22/2022 0000 by Thang Tate RN  Body Position: position maintained     Problem: Plan of Care - These are the overarching goals to be used throughout the patient stay.    Goal: Optimal Comfort and Wellbeing  Outcome: Progressing   Goal Outcome Evaluation:       A&O X 4. VSS /58 (BP Location: Left arm)   Pulse 86   Temp 98.3  F (36.8  C) (Oral)   Resp 18   Ht 1.6 m (5' 3\")   Wt 61.7 kg (136 lb)   LMP  (LMP Unknown)   SpO2 94%   BMI 24.09 kg/m   . AX1, itch cream applied to rash all over body. IV fluids running at 75ml/hr.                "

## 2022-11-22 NOTE — PROGRESS NOTES
Progress Note      Date of admission: 11/19/2022  Assessment/Plan  Nguyen Olivier is a 77 year old old female with Pmhx of lung CA, chemotherapy induced neutropenia. Recently started chemotherapy for lung cancer. Work-up also showed new IJ thrombus.      Severe dysphagia possibly secondary to oropharyngeal candidiasis  -Has mouth pain, difficulty in swallowing with odynophagia . CT neck unrevealing for neck mass.    Possibly due to mucositis/Candida.    Has oral thrush  Continue fluconazole 200 mg IV once a day and Magic mouth wash   -Unable to have adequate adequate oral intake due to pain.   -maintenance IV fluid with kcl   -Oncology consult appreciated.    -IJ venous thrombosis is being treated by Eliquis as per recommendation from oncology.  -Camron is on hold as patient is unable to take anything oral     Intractable pain   -Palliative care consult appreciated.  -Patient has expressed interest to transition to hospice care either inpatient or outpatient.  -Pain control with oral morphine solution, Tylenol and gabapentin.    Lung cancer, stage IV  -Recently started chemotherapy  -Oncology consult appreciated    Right IJ vein thrombus   CT imaging.  Eliquis started as per oncology recommendation.  -Patient would like to get her Ortho cath removed.  -She is not interested in further chemotherapy.    Thrombocytopenia  Likely related to chemotherapy  Came was 49 now dropped to 14  Continue to monitor platelet count     Chronic respiratory failure, on home oxygen therapy,   - Chest x-ray showing unchanged right base opacity and moderate effusion.  Oncology consulted  -On 2 L oxygen via nasal cannula.    Diffuse rash  -Patient has diffuse rash involving trunk and lower extremities.  She states that it is itching  -Could be related to chemotherapy or other drug reaction  -Dexamethasone 4 mg IV once a day started by oncology  -Calamine lotion     DNR/DNI     DVT PPX : lovenox     Barriers to discharge: severe  odynophagia, determination of goals of care, hospice care arrangement    Anticipated Discharge date  : 1 to 2 days    Subjective  No distress noted.  On 2 L oxygen via nasal cannula.  Patient uses home oxygen at baseline.  Patient's main concern is dysphagia and inability to take adequate oral intake.  Patient also states that itching is has been bothersome.  management plan discussed with the patient and she expressed understanding.    Objective  Vital signs in last 24 hours  Temp:  [98.3  F (36.8  C)-99.7  F (37.6  C)] (P) 98.4  F (36.9  C)  Pulse:  [] (P) 98  Resp:  [18-20] (P) 20  BP: ()/(50-60) (P) 105/51  SpO2:  [93 %-96 %] (P) 96 %    Input and Output in 24 hrs     Intake/Output Summary (Last 24 hours) at 11/20/2022 1207  Last data filed at 11/19/2022 1550  Gross per 24 hour   Intake 1000 ml   Output --   Net 1000 ml       Physical Exam:  GEN: Alert and oriented. Not in acute distress  HEENT: Mucous membrane- moist and pink, lost tooth , has oral thrush  CHEST: Clear to auscultation bilaterally  HEART: S1S2 regular.   ABDOMEN: Soft. Non-tender, non-distended. No organomegaly.  Bowel sounds- active  Extremities: No pedal oedema  CNS: No focal neurological deficit. No involuntary movements  SKIN: Patient has diffuse rash on trunk and lower extremities which appears to be petechiael and pruritic.    Pertinent Labs   Lab Results: Personally Reviewed    Recent Results (from the past 24 hour(s))   CBC with platelets and differential    Collection Time: 11/22/22  6:55 AM   Result Value Ref Range    WBC Count 1.7 (L) 4.0 - 11.0 10e3/uL    RBC Count 4.39 3.80 - 5.20 10e6/uL    Hemoglobin 12.3 11.7 - 15.7 g/dL    Hematocrit 39.6 35.0 - 47.0 %    MCV 90 78 - 100 fL    MCH 28.0 26.5 - 33.0 pg    MCHC 31.1 (L) 31.5 - 36.5 g/dL    RDW 14.6 10.0 - 15.0 %    Platelet Count 14 (LL) 150 - 450 10e3/uL   Manual Differential    Collection Time: 11/22/22  6:55 AM   Result Value Ref Range    % Neutrophils 71 %    %  Lymphocytes 27 %    % Monocytes 2 %    % Eosinophils 0 %    % Basophils 0 %    Absolute Neutrophils 1.2 (L) 1.6 - 8.3 10e3/uL    Absolute Lymphocytes 0.5 (L) 0.8 - 5.3 10e3/uL    Absolute Monocytes 0.0 0.0 - 1.3 10e3/uL    Absolute Eosinophils 0.0 0.0 - 0.7 10e3/uL    Absolute Basophils 0.0 0.0 - 0.2 10e3/uL    RBC Morphology Confirmed RBC Indices     Platelet Assessment  Automated Count Confirmed. Platelet morphology is normal.     Automated Count Confirmed. Platelet morphology is normal.       Pertinent Radiology   Radiology Results reviewed      EKG Results reviewed       Advanced Care Planning      Lina Diaz MD   [facility]

## 2022-11-22 NOTE — PROGRESS NOTES
"PALLIATIVE CARE SOCIAL WORK Progress Note   [facility]    REFERRAL SOURCE: Palliative Care    PCSW was asked to meet Suma and family for emotional support. Met with Suma, son-Chuck, and sister-Jasmin. Suma is feeling a little better today than yesterday. She feels like she slept well last night. She is frustrated with the number of visits/ interruptions. Briefly discussed the idea of comfort care approach. Suma reports that she would \"love\" to have less people in her room, she \"just wants to be at peace\".     Suma is concerned with how her family is coping. Chuck is open to PCSW calling him tomorrow to check in and allow for time to talk.     Plan: PCSW will continue to be available for ongoing emotional support while Palliative Care Team is following.     ESTEFANY Valenzuela, [facility]  Palliative Care Clinical   Pager [phone]  Office [phone]    "

## 2022-11-22 NOTE — PLAN OF CARE
Goal Outcome Evaluation:               Pt did have small bloody sputum this evening on eliquis, plt's low but not on any antiplatelet meds.  Pt tolerating popsicles and ice chips.

## 2022-11-22 NOTE — PROGRESS NOTES
Care Management Follow Up    Length of Stay (days): 2    Expected Discharge Date:  Pending     Concerns to be Addressed:  Hospice home acceptance    Patient plan of care discussed at interdisciplinary rounds: Yes    Anticipated Discharge Disposition: Hospice home     Anticipated Discharge Services:  Hospice    Anticipated Discharge DME:  O2     Education Provided on the Discharge Plan:  Yes    Patient/Family in Agreement with the Plan:  yes    Referrals Placed by CM/SW:  Hospice home    Private pay costs discussed:  TBD    Additional Information:  Patient admitted for Severe dysphagia possibly secondary to oropharyngeal candidiasis. History of Lung cancer stage IV s/p chemotherapy.    Patient transitioned to comfort care.    Met with patient and her son Chuck and sister. Patient and Chuck's stated goal is for hospice placement. Preference [facility] or [facility].  Referrals made to [facility] (no bed until maybe next week) and [facility]. Chuck will be contacting [facility]'s to verify what they have to available. Transportation TBD. Chuck will be primary family contact for discharge planning.         Sharon Lopez RN

## 2022-11-22 NOTE — PROGRESS NOTES
[facility]  Palliative Care Daily Progress Note       Recommendations & Counseling     Suam is feeling a bit better today with current treatments. She maintains that she is not interested in resuming chemotherapy (even at reduced dose) and would like to pursue hospice placement.     Goals of Care: life-prolonging with plan to transition to comfort-focused on discharge     Advanced Care Planning:  Advance directive: None  POLST: No - complete prior to discharge  Code status: DNR/DNI  Health care agent/surrogate: dominique Noel     Symptom Management:  #Dysphagia, mouth pain, oropharyngeal candidiasis  - Continue IV fluconazole  - Continue magic mouthwash before meals and nightly  - Changed tylenol to prn only (patient declining scheduled doses)  - Continue gabapentin 100mg solution at bedtime  - Continue oral morphine solution 5mg q2h prn  - Continue IV dilaudid 0.2mg q3h prn  - Requested [facility]     #Rash, pruritis  - Possibly due to chemo  - Oncology started medrol dose pack  - Continue calamine lotion tid and prn  - Continue benadryl cream tid  - Discontinued atarax due to risk of confusion, dry mouth, and other anticholinergic effects    #Constipation  - Likely due to poor oral intake  - Start senna bid     #Insomnia  - Gabapentin as above  - Pain management as above     Psychosocial & Spiritual:   Lives alone,  passed away on hospice years ago  Dominique Noel live nearby along with their families. Has 5 grandchildren. Had 3 sons but one passed away in 2016.   Does not identify as spiritual or Restoration  Appreciate Palliative SW support      Assessments          Nguyen is a 78 y/o woman recently diagnosed with stage IV right lung cancer (lymph node involvement above and below diaphragm), now on chemo, admitted 11/19/2022 with severe dysphagia and mouth pain attributed to candidiasis. Found to have internal jugular DVT and new right sided pleural effusion.  -  Recently admitted 9/29-10/4/2022 for acute hypoxic respiratory failure. Found to have advanced pneumonia and endobronchial tumor. Discharged on 4L home oxygen (new at that time).    Today, the patient was seen for:  Symptom management, goals of care    Prognosis, Goals, or Advance Care Planning was addressed today with: Yes.  Mood, coping, and/or meaning in the context of serious illness were addressed today: Yes.              Interval History:     Chart review/discussion with unit or clinical team members:   - Seen by Oncology who offered reduced dose chemo, Suma indicated she still wants to pursue hospice care  - Oncology started medrol dose pack for rash  - Gelcair solution not available  - Received 1x IV dilaudid 0.2mg and 1x oral morphine 5mg in past 24h, declining scheduled tylenol solution    Per patient or family/caregivers today:  Suma is feeling ok today, notes she still has trouble swallowing but overall pain is improved with current regimen. Her itching has significantly improved since starting steroids today.     We discussed her meeting with Oncology yesterday. Suma indicated that she absolutely does not want further chemotherapy, even at a reduced dose, because she worries these side effects could recur. Provided emotional support.    Key Palliative Symptoms:  # Pain severity the last 12 hours: moderate  # Dyspnea severity the last 12 hours: none  # Nausea severity the last 12 hours: none  # Anxiety severity the last 12 hours: not assessed    Patient is on opioids: assessed and I made recommendations about bowel care as above.           Review of Systems:     Besides above, an additional 3 system ROS was reviewed and is unremarkable          Medications:     I have reviewed this patient's medication profile and medications during this hospitalization.    Noted meds:    Current Facility-Administered Medications   Medication     acetaminophen (TYLENOL) solution 650 mg     acetaminophen (TYLENOL)  "solution 650 mg     albuterol (PROVENTIL HFA/VENTOLIN HFA) inhaler     [Held by provider] apixaban ANTICOAGULANT (ELIQUIS) tablet 5 mg     benzocaine-menthol (CEPACOL) 15-3.6 MG lozenge 1 lozenge     budesonide (PULMICORT) neb solution 0.5 mg     calamine 8-8 % lotion     calamine 8-8 % lotion     [START ON 11/23/2022] dexamethasone PF (DECADRON) injection 4 mg     dexamethasone PF (DECADRON) injection 4 mg     dextrose 5% and 0.9% NaCl infusion     glucose gel 15-30 g    Or     dextrose 50 % injection 25-50 mL    Or     glucagon injection 1 mg     diphenhydrAMINE-zinc acetate (BENADRYL) 2-0.1 % cream     fluconazole (DIFLUCAN) intermittent infusion 200 mg     gabapentin (NEURONTIN) solution 100 mg     [Held by provider] heparin lock flush 10 UNIT/ML injection 5-10 mL     HYDROmorphone (DILAUDID) injection 0.2 mg     ipratropium - albuterol 0.5 mg/2.5 mg/3 mL (DUONEB) neb solution 3 mL     magic mouthwash suspension (diphenhydramine, lidocaine, aluminum-magnesium & simethicone)     morphine solution 5 mg     naloxone (NARCAN) injection 0.2 mg    Or     naloxone (NARCAN) injection 0.4 mg    Or     naloxone (NARCAN) injection 0.2 mg    Or     naloxone (NARCAN) injection 0.4 mg     ondansetron (ZOFRAN) tablet 8 mg     prochlorperazine (COMPAZINE) tablet 10 mg     sodium chloride (PF) 0.9% PF flush 10-20 mL     sodium chloride (PF) 0.9% PF flush 10-20 mL     sodium chloride (PF) 0.9% PF flush 10-20 mL                Physical Exam:   Vital Signs: Blood pressure (P) 105/51, pulse (P) 98, temperature (P) 98.4  F (36.9  C), temperature source (P) Oral, resp. rate (P) 20, height 1.6 m (5' 3\"), weight 61.7 kg (136 lb), SpO2 (P) 96 %.   GENERAL: laying in bed, significant throat pain with swallowing or speaking for extended period  SKIN: +diffuse erythematous rash, concentrated over back and chest, with excoriations  HEENT: +temporal muscle wasting, +erythematous tongue and oral mucosa  LUNGS: Clear to auscultation " anterolaterally; non-labored   CARDIAC: RRR  ABDOMINAL: soft, non distended, non tender  MUSKL: no gross joint deformities   NEUROLOGIC: A&Ox4  PSYCH: full affect           Data Reviewed:     Reviewed recent pertinent imaging:   No new imaging    Reviewed recent labs:   CMP  Recent Labs   Lab 11/21/22  0544 11/20/22  1921 11/20/22  1242 11/20/22  1156 11/20/22  0717 11/19/22  1325   *  --   --   --  134* 131*   POTASSIUM 3.3*  --   --   --  4.2 4.1   CHLORIDE 99  --   --   --  96* 94*   CO2 28  --   --   --  25 26   ANIONGAP 7  --   --   --  13 11   * 85 195* 61* 56* 70   BUN 10.0  --   --   --  14.1 13.2   CR 0.48*  --   --   --  0.55 0.55   GFRESTIMATED >90  --   --   --  >90 >90   JO 7.5*  --   --   --  8.0* 8.3*   PROTTOTAL  --   --   --   --   --  5.1*   ALBUMIN  --   --   --   --   --  3.0*   BILITOTAL  --   --   --   --   --  1.4*   ALKPHOS  --   --   --   --   --  57   AST  --   --   --   --   --  14   ALT  --   --   --   --   --  17     CBC  Recent Labs   Lab 11/22/22  0655 11/21/22  0544 11/20/22  0717 11/19/22  1325   WBC 1.7* 2.6* 4.5 9.3   RBC 4.39 4.12 4.63 4.69   HGB 12.3 11.6* 13.0 13.4   HCT 39.6 36.0 41.9 40.5   MCV 90 87 91 86   MCH 28.0 28.2 28.1 28.6   MCHC 31.1* 32.2 31.0* 33.1   RDW 14.6 14.5 14.6 14.6   PLT 14* 24* 37* 49*        TTS: I have personally spent a total of 25 minutes  today on the unit in review of medical record, consultation with the medical providers and assessment of patient today, with more than 50% of this time spent in counseling, coordination of care, and conversation in a family meeting re: diagnostic results, prognosis, symptom management, risks and benefits of management options,  emotional support and development of plan of care.     Gina Horn PA-C  [facility], Palliative Care  Dept phone: [phone]  Securely message with the Vocera Web Console

## 2022-11-23 NOTE — PLAN OF CARE
Pt drowsy this shift and was noted to be sleeping in between cares.  No prn medication needed/given.  Pt reported her rash felt better and that she didn't have itchiness anymore.  Neupogen started by oncology.  Plan is for pt to discharge on hospice.

## 2022-11-23 NOTE — PLAN OF CARE
Goal Outcome Evaluation:         Pt appears more comfortable this evening, declines any PO medication at this time as due to liquids give her gagging, MD's updated and medications changed to direct towards patient centered care.  Writer is laurita crooks.  Pt feels much better after calamine and benadryl applied.  Pt's 2 son's and granddaughter visited this evening, patient was laughing and visiting.

## 2022-11-23 NOTE — PROGRESS NOTES
PALLIATIVE CARE SOCIAL WORK Progress Note   [facility]    Follow Up    PCSW called Chuck this afternoon to check in. He reports that he and family are doing well. They understand and support their mom's decision to not have more chemo. He feels that they're all doing a good job of relying on each other for support. He feels that they've had lots of practice with grief (brother  in 2016 and dad  last year). He feels that they've learned coping strategies. He worries most about his nephew (son of his brother who ). He helped him find a counselor after that death and will support him if it's needed again. PCSW reminded him of [facility]'s bereavement services. He was aware and will use them if needed.      Plan: PCSW will continue to be available for family support as needed while Palliative Care Team is following.     ESTEFANY Valenzuela, [facility]  Palliative Care Clinical   Pager [phone]    [facility] Inpatient Team Consult Pager [phone] Mon-Fri 8-4:30  After hours Answering Service [phone]

## 2022-11-23 NOTE — PROGRESS NOTES
[facility] Hematology and Oncology Progress Note    Patient: Nguyen Olivier  MRN: [id number]  Date of Service: Nov 19, 2022        Assessment and Plan:    Cancer Staging   Malignant neoplasm of lower lobe of right lung (H)  Staging form: Lung, AJCC 8th Edition  - Clinical: Stage IVB (cT3, cN3, cM1c) - Unsigned    1.  Metastatic adenocarcinoma of the right lung: She received 1 cycle of pemetrexed, carboplatin, and pembrolizumab on November 14.  Today is day 10.  She had significant mucositis and dermatitis from her treatment.  She also has significant cytopenias.  Given her poor tolerance to her treatment she has decided to move to comfort measures and hospice upon discharge.  We will start folic acid.    2.  Mucositis: Possibly component of thrush.  She is receiving daily fluconazole    3.  Cutaneous drug reaction: She is getting IV dexamethasone, currently daily dosing.  Today is day 2.    4.  Chemotherapy-induced cytopenias: Her neutrophil count is 500 today.  She has been started on Neupogen 300 mcg daily today.  Would recommend a platelet transfusion if her platelet count drops to 10,000 or less.    5.  Neck vein DVT: No anticoagulation till platelet count is greater than 30,000.    ECOG Performance  4    Diagnosis:    1.  Metastatic adenocarcinoma of the right lung    Treatment:    Today is cycle 1 day 10 of pemetrexed, carboplatin, and pembrolizumab.    Interim History:    Suma continues to have mucositis and mouth pain.  No diarrhea.  Shortness of breath is stable.  She thinks her rash is stable as well compared to yesterday.  No bowel movement in 2 days.  No new symptoms today.    Review of Systems:    As above in the history.     Review of Systems otherwise Negative for:  General: chills, fever or night sweats  Psychological: anxiety or depression  Ophthalmic: blurry vision, double vision or loss of vision, vision change  ENT: epistaxis, hearing changes  Hematological and Lymphatic: bleeding,  "bruising, jaundice, swollen lymph nodes  Endocrine: hot flashes, unexpected weight changes  Respiratory: hemoptysis, orthopnea  Cardiovascular: chest pain, edema, palpitations or PND  Gastrointestinal: abdominal pain, blood in stools, change in bowel habits, constipation, diarrhea or nausea/vomiting  Genito-Urinary: change in urinary stream, incontinence, frequency/urgency  Musculoskeletal: joint pain, stiffness, swelling, muscle pain  Neurological: dizziness, headaches, numbness/tingling  Dermatological: lumps    Past History:    Past Medical History:   Diagnosis Date     Cancer (H)     lung     Right Macular hole      Physical Exam:    /53 (BP Location: Right arm)   Pulse 82   Temp 98.4  F (36.9  C) (Oral)   Resp 20   Ht 1.6 m (5' 3\")   Wt 61.7 kg (136 lb)   LMP  (LMP Unknown)   SpO2 95%   BMI 24.09 kg/m      General: patient appears stated age of 77 year old. Nontoxic and in no distress.   HEENT: Head: atraumatic, normocephalic. Sclerae anicteric.  Chest:  Normal respiratory effort  Cardiac:  No edema.   Abdomen: abdomen is non-distended  Extremities: normal tone and muscle bulk.  Skin: no lesions or rash on visible skin. Warm and dry.   CNS: alert and oriented. Grossly non-focal.   Psychiatric: normal mood and affect.     Lab Results:    Recent Results (from the past 168 hour(s))   Basic metabolic panel   Result Value Ref Range    Sodium 131 (L) 136 - 145 mmol/L    Potassium 4.1 3.4 - 5.3 mmol/L    Chloride 94 (L) 98 - 107 mmol/L    Carbon Dioxide (CO2) 26 22 - 29 mmol/L    Anion Gap 11 7 - 15 mmol/L    Urea Nitrogen 13.2 8.0 - 23.0 mg/dL    Creatinine 0.55 0.51 - 0.95 mg/dL    Calcium 8.3 (L) 8.8 - 10.2 mg/dL    Glucose 70 70 - 99 mg/dL    GFR Estimate >90 >60 mL/min/1.73m2   Hepatic function panel   Result Value Ref Range    Protein Total 5.1 (L) 6.4 - 8.3 g/dL    Albumin 3.0 (L) 3.5 - 5.2 g/dL    Bilirubin Total 1.4 (H) <=1.2 mg/dL    Alkaline Phosphatase 57 35 - 104 U/L    AST 14 10 - 35 U/L    " ALT 17 10 - 35 U/L    Bilirubin Direct 0.34 (H) 0.00 - 0.30 mg/dL   Lactic acid whole blood   Result Value Ref Range    Lactic Acid 1.1 0.7 - 2.0 mmol/L   CBC with platelets and differential   Result Value Ref Range    WBC Count 9.3 4.0 - 11.0 10e3/uL    RBC Count 4.69 3.80 - 5.20 10e6/uL    Hemoglobin 13.4 11.7 - 15.7 g/dL    Hematocrit 40.5 35.0 - 47.0 %    MCV 86 78 - 100 fL    MCH 28.6 26.5 - 33.0 pg    MCHC 33.1 31.5 - 36.5 g/dL    RDW 14.6 10.0 - 15.0 %    Platelet Count 49 (LL) 150 - 450 10e3/uL    % Neutrophils 87 %    % Lymphocytes 9 %    % Monocytes 0 %    % Eosinophils 2 %    % Basophils 0 %    % Immature Granulocytes 2 %    NRBCs per 100 WBC 0 <1 /100    Absolute Neutrophils 8.2 1.6 - 8.3 10e3/uL    Absolute Lymphocytes 0.9 0.8 - 5.3 10e3/uL    Absolute Monocytes 0.0 0.0 - 1.3 10e3/uL    Absolute Eosinophils 0.2 0.0 - 0.7 10e3/uL    Absolute Basophils 0.0 0.0 - 0.2 10e3/uL    Absolute Immature Granulocytes 0.2 <=0.4 10e3/uL    Absolute NRBCs 0.0 10e3/uL   Asymptomatic COVID-19 Virus (Coronavirus) by PCR Nasopharyngeal    Specimen: Nasopharyngeal; Swab   Result Value Ref Range    SARS CoV2 PCR Negative Negative   ECG 12-LEAD WITH MUSE (LHE)   Result Value Ref Range    Systolic Blood Pressure  mmHg    Diastolic Blood Pressure  mmHg    Ventricular Rate 87 BPM    Atrial Rate 87 BPM    NV Interval 154 ms    QRS Duration 68 ms     ms    QTc 430 ms    P Axis 61 degrees    R AXIS -53 degrees    T Axis 57 degrees    Interpretation ECG       Sinus rhythm  Left axis deviation  Abnormal ECG  When compared with ECG of 29-SEP-2022 20:40,  Premature atrial complexes are no longer Present  Confirmed by NOMAN MACKEY MD LOC:[facility] [id number]) on 11/20/2022 1:49:31 PM     Basic metabolic panel   Result Value Ref Range    Sodium 134 (L) 136 - 145 mmol/L    Potassium 4.2 3.4 - 5.3 mmol/L    Chloride 96 (L) 98 - 107 mmol/L    Carbon Dioxide (CO2) 25 22 - 29 mmol/L    Anion Gap 13 7 - 15 mmol/L    Urea Nitrogen 14.1 8.0 - 23.0  mg/dL    Creatinine 0.55 0.51 - 0.95 mg/dL    Calcium 8.0 (L) 8.8 - 10.2 mg/dL    Glucose 56 (L) 70 - 99 mg/dL    GFR Estimate >90 >60 mL/min/1.73m2   CBC with platelets and differential   Result Value Ref Range    WBC Count 4.5 4.0 - 11.0 10e3/uL    RBC Count 4.63 3.80 - 5.20 10e6/uL    Hemoglobin 13.0 11.7 - 15.7 g/dL    Hematocrit 41.9 35.0 - 47.0 %    MCV 91 78 - 100 fL    MCH 28.1 26.5 - 33.0 pg    MCHC 31.0 (L) 31.5 - 36.5 g/dL    RDW 14.6 10.0 - 15.0 %    Platelet Count 37 (LL) 150 - 450 10e3/uL    % Neutrophils 83 %    % Lymphocytes 12 %    % Monocytes 1 %    % Eosinophils 2 %    % Basophils 0 %    % Immature Granulocytes 2 %    NRBCs per 100 WBC 0 <1 /100    Absolute Neutrophils 3.8 1.6 - 8.3 10e3/uL    Absolute Lymphocytes 0.5 (L) 0.8 - 5.3 10e3/uL    Absolute Monocytes 0.0 0.0 - 1.3 10e3/uL    Absolute Eosinophils 0.1 0.0 - 0.7 10e3/uL    Absolute Basophils 0.0 0.0 - 0.2 10e3/uL    Absolute Immature Granulocytes 0.1 <=0.4 10e3/uL    Absolute NRBCs 0.0 10e3/uL   Glucose by meter   Result Value Ref Range    GLUCOSE BY METER POCT 61 (L) 70 - 99 mg/dL   Glucose by meter   Result Value Ref Range    GLUCOSE BY METER POCT 195 (H) 70 - 99 mg/dL   Heparin Induced Thrombocytopenia Screen   Result Value Ref Range    HIT Screen Negative Negative   Glucose by meter   Result Value Ref Range    GLUCOSE BY METER POCT 85 70 - 99 mg/dL   CBC with platelets   Result Value Ref Range    WBC Count 2.6 (L) 4.0 - 11.0 10e3/uL    RBC Count 4.12 3.80 - 5.20 10e6/uL    Hemoglobin 11.6 (L) 11.7 - 15.7 g/dL    Hematocrit 36.0 35.0 - 47.0 %    MCV 87 78 - 100 fL    MCH 28.2 26.5 - 33.0 pg    MCHC 32.2 31.5 - 36.5 g/dL    RDW 14.5 10.0 - 15.0 %    Platelet Count 24 (LL) 150 - 450 10e3/uL   Basic metabolic panel   Result Value Ref Range    Sodium 134 (L) 136 - 145 mmol/L    Potassium 3.3 (L) 3.4 - 5.3 mmol/L    Chloride 99 98 - 107 mmol/L    Carbon Dioxide (CO2) 28 22 - 29 mmol/L    Anion Gap 7 7 - 15 mmol/L    Urea Nitrogen 10.0 8.0 -  23.0 mg/dL    Creatinine 0.48 (L) 0.51 - 0.95 mg/dL    Calcium 7.5 (L) 8.8 - 10.2 mg/dL    Glucose 134 (H) 70 - 99 mg/dL    GFR Estimate >90 >60 mL/min/1.73m2   CBC with platelets and differential   Result Value Ref Range    WBC Count 1.7 (L) 4.0 - 11.0 10e3/uL    RBC Count 4.39 3.80 - 5.20 10e6/uL    Hemoglobin 12.3 11.7 - 15.7 g/dL    Hematocrit 39.6 35.0 - 47.0 %    MCV 90 78 - 100 fL    MCH 28.0 26.5 - 33.0 pg    MCHC 31.1 (L) 31.5 - 36.5 g/dL    RDW 14.6 10.0 - 15.0 %    Platelet Count 14 (LL) 150 - 450 10e3/uL   Manual Differential   Result Value Ref Range    % Neutrophils 71 %    % Lymphocytes 27 %    % Monocytes 2 %    % Eosinophils 0 %    % Basophils 0 %    Absolute Neutrophils 1.2 (L) 1.6 - 8.3 10e3/uL    Absolute Lymphocytes 0.5 (L) 0.8 - 5.3 10e3/uL    Absolute Monocytes 0.0 0.0 - 1.3 10e3/uL    Absolute Eosinophils 0.0 0.0 - 0.7 10e3/uL    Absolute Basophils 0.0 0.0 - 0.2 10e3/uL    RBC Morphology Confirmed RBC Indices     Platelet Assessment  Automated Count Confirmed. Platelet morphology is normal.     Automated Count Confirmed. Platelet morphology is normal.   Basic metabolic panel   Result Value Ref Range    Sodium 137 136 - 145 mmol/L    Potassium 4.3 3.4 - 5.3 mmol/L    Chloride 106 98 - 107 mmol/L    Carbon Dioxide (CO2) 24 22 - 29 mmol/L    Anion Gap 7 7 - 15 mmol/L    Urea Nitrogen 6.4 (L) 8.0 - 23.0 mg/dL    Creatinine 0.43 (L) 0.51 - 0.95 mg/dL    Calcium 7.6 (L) 8.8 - 10.2 mg/dL    Glucose 171 (H) 70 - 99 mg/dL    GFR Estimate >90 >60 mL/min/1.73m2   CBC with platelets and differential   Result Value Ref Range    WBC Count 0.7 (LL) 4.0 - 11.0 10e3/uL    RBC Count 3.69 (L) 3.80 - 5.20 10e6/uL    Hemoglobin 10.3 (L) 11.7 - 15.7 g/dL    Hematocrit 32.5 (L) 35.0 - 47.0 %    MCV 88 78 - 100 fL    MCH 27.9 26.5 - 33.0 pg    MCHC 31.7 31.5 - 36.5 g/dL    RDW 14.3 10.0 - 15.0 %    Platelet Count 13 (LL) 150 - 450 10e3/uL   Manual Differential   Result Value Ref Range    % Neutrophils 76 %    %  Lymphocytes 22 %    % Monocytes 2 %    % Eosinophils 0 %    % Basophils 0 %    Absolute Neutrophils 0.5 (L) 1.6 - 8.3 10e3/uL    Absolute Lymphocytes 0.2 (L) 0.8 - 5.3 10e3/uL    Absolute Monocytes 0.0 0.0 - 1.3 10e3/uL    Absolute Eosinophils 0.0 0.0 - 0.7 10e3/uL    Absolute Basophils 0.0 0.0 - 0.2 10e3/uL    RBC Morphology Confirmed RBC Indices     Platelet Assessment  Automated Count Confirmed. Platelet morphology is normal.     Automated Count Confirmed. Platelet morphology is normal.     Imaging:    US Biopsy Lymph Node Core    Result Date: 10/28/2022  EXAM: 1. PERCUTANEOUS BIOPSY RIGHT SUPRACLAVICULAR LYMPH NODE 2. ULTRASOUND GUIDANCE LOCATION: [facility] DATE/TIME: 10/28/2022 3:48 PM INDICATION:  Lung mass and supraclavicular adenopathy. PROCEDURE: Informed consent obtained. Site marked. Prior images reviewed. Required items made available. Patient identity was confirmed verbally and with arm band. Patient reevaluated immediately before administering sedation. Universal protocol was followed. Time out performed. The site was prepped and draped in sterile fashion. 8 mL of 1 percent lidocaine was infused into the local soft tissues. Using standard technique and under direct ultrasound guidance, a 20 gauge biopsy needle was used to make five core biopsies. Tissue was submitted to Pathology. The patient tolerated the procedure well. No complications. SEDATION: No sedation given.     IMPRESSION: Status post US-guided biopsy right supraclavicular adenopathy. Reference CPT Code: 09470, 72482    XR Chest Port 1 View    Result Date: 11/19/2022  EXAM: XR CHEST PORT 1 VIEW LOCATION: [facility] DATE/TIME: 11/19/2022 6:47 PM INDICATION: Shortness of breath. COMPARISON: 10/02/2022.     IMPRESSION: Unchanged right lung base opacity and moderate size right pleural effusion. Diffuse interstitial changes throughout the right lung are also similar to prior, suspicious for a  lymphangitic carcinomatosis. However, infectious/inflammatory infiltrates and asymmetric edema are difficult to exclude. Left lung is clear. No left pleural effusion. No pneumothorax at either lung. Cardiomediastinal silhouette is difficult to evaluate, given the right lung base opacity. Atherosclerotic calcifications involve the thoracic aorta. There is a new right chest wall port catheter, tip overlying the distal superior vena cava.    MR Brain w/o & w Contrast    Result Date: 11/11/2022  EXAM: MR BRAIN W/O and W CONTRAST LOCATION: [facility] DATE/TIME: 11/11/2022 4:54 PM INDICATION: Lung cancer staging. COMPARISON: None. CONTRAST: 6ml gadavist TECHNIQUE: Routine multiplanar multisequence head MRI without and with intravenous contrast. FINDINGS: INTRACRANIAL CONTENTS: A few subtle foci of mild restricted diffusion within the right parietal lobe (series 6.2 image 17), right parietal occipital junction without associated enhancement (series 6.2 image 14), and in the right precentral gyrus (series 14.2 image 14). The latter two lesions demonstrate faint associated enhancement (series 5003.4 image 39 and series 17 image 58, respectively). Associated FLAIR hyperintensity. An additional focus of enhancement without associated restricted diffusion in the right occipital lobe (series 5003.4 image 24). No associated edema or mass effect. No acute intracranial hemorrhage or extra-axial fluid collection. 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 Additional patchy and confluent nonspecific T2/FLAIR hyperintensities within the cerebral white matter most consistent with moderate chronic microvascular ischemic change. Mild generalized cerebral parenchymal volume loss. No acute hydrocephalus. Normal position of the cerebellar tonsils. SELLA: No abnormality accounting for technique. OSSEOUS STRUCTURES/SOFT TISSUES: No suspicious bone marrow signal intensity. The major  intracranial arterial flow voids are maintained. Partial loss of right sigmoid sinus and internal jugular vein flow voids, likely due to slow flow. ORBITS: No abnormality accounting for technique. SINUSES/MASTOIDS: Left sphenoid sinus mucosal thickening/secretions. No mastoid effusion.     IMPRESSION: 1.  A few punctate supratentorial foci of enhancement within the right cerebral hemisphere, concerning for intracranial metastases in this patient with known lung cancer. Alternatively, these may represent enhancement associated with subacute infarcts given associated mild restricted diffusion. Short-term follow-up with MRI brain without and with IV contrast is recommended to evaluate interval change. 2.  No associated mass effect or edema. 3.  Additional chronic findings, as above.    IR Chest Port Placement > 5 Yrs of Age    Result Date: 11/11/2022  [place] [facility] LOCATION: [facility] DATE: 11/11/2022 PROCEDURE: 1.  CENTRAL VENOUS PORT PLACEMENT 2.  FLUOROSCOPIC GUIDANCE FOR CATHETER PLACEMENT 3.  ULTRASOUND GUIDANCE FOR VASCULAR ACCESS 4.  CONSCIOUS SEDATION INTERVENTIONAL RADIOLOGIST: Arjun Cunningham MD. INDICATION: Right lung cancer, port for chemotherapy. SEDATION: Versed 0.5 mg. Fentanyl 100 mcg. The procedure was performed with administration intravenous conscious sedation with appropriate preoperative, intraoperative, and postoperative evaluation. During the time-out, immediately prior to administration of medications, the patient was reassessed for adequacy to receive conscious sedation. 14 minutes of supervised face to face conscious sedation time was provided by a radiology nurse under my direct supervision. ANTIBIOTICS: Ancef 2 gram IV. Air Kerma: 4 mGy FLUOROSCOPIC TIME: 0.4 minutes CONTRAST: None. COMPLICATIONS: No immediate complications. STERILE BARRIER TECHNIQUE: Maximum sterile barrier technique was used. Cutaneous antisepsis was performed at the operative site with  application of 2% chlorhexidine and large sterile drape. Prior to the procedure, the surgeon and assistant performed hand  hygiene and wore hat, mask, sterile gown, and sterile gloves during the entire procedure. PROCEDURE: Ultrasound demonstrated a patent and compressible right internal  jugular vein, and an ultrasound image was obtained and placed in the patient's permanent medical record. The right neck and chest were prepped and draped in usual sterile fashion. Using real-time ultrasound guidance, the right internal jugular vein was accessed. A subcutaneous pocket was created and irrigated with sterile normal saline. The catheter was tunneled in an antegrade fashion. Over the guidewire, a peel-away sheath was advanced with fluoroscopic monitoring. Through the peel-away sheath, the catheter was advanced until the tip was at the cavoatrial junction. The catheter was cut to length and attached firmly to the port. The incisions were closed with layered absorbable  suture and surgical glue. FINDINGS: Ultrasound shows an anechoic and compressible jugular vein. At the completion of the study, the port tip lies at the cavoatrial junction. TYPE OF PORT:  8 [employer] PowerPort     IMPRESSION: Successful power-injectable venous port placement. CPT codes for physician reference only: 37372 [id number]    Soft tissue neck CT w contrast    Result Date: 11/19/2022  EXAM: CT SOFT TISSUE NECK W CONTRAST LOCATION: [facility] DATE/TIME: 11/19/2022 5:29 PM INDICATION: Pain on right side. COMPARISON: None. CONTRAST: Isovue 370 100 ml. TECHNIQUE: Routine CT Soft Tissue Neck with IV contrast. Multiplanar reformats. Dose reduction techniques were used. FINDINGS: MUCOSAL SPACES/SOFT TISSUES: Normal mucosal spaces of the upper aerodigestive tract. No mucosal mass or inflammation identified. Normal infraglottic trachea. The vocal apparatus is adducted and difficult to assess. No definite enhancing mass lesions  identified. Normal parapharyngeal space and subcutaneous soft tissues. No enhancing oral cavity mass lesions. Normal floor of mouth structures. Normal  spaces. Scattered endodontal and periodontal disease. LYMPH NODES: No pathologic lymph nodes by size or morphology criteria. SALIVARY GLANDS: Normal parotid and submandibular glands. THYROID: Hypoplastic/atrophic left lobe of the thyroid. Otherwise normal thyroid gland. VESSELS: The arterial vascular structures of the neck appear patent. There is a right-sided portacatheter involving the right internal jugular vein and extending into the right brachiocephalic vein. Above the level of the portacatheter within the right internal jugular vein there is occlusive thrombus which tapers and dissipates at the level of the superior aspect of the right thyroid cartilage. Below the level of the catheter insertion site, there is no contrast opacifying the right internal jugular vein. VISUALIZED INTRACRANIAL/ORBITS/SINUSES: No abnormality of the visualized intracranial compartment or orbits. Visualized paranasal sinuses and mastoid air cells are clear. OTHER: No destructive osseous lesion. Right-sided pleural effusion with right apical intralobular septal thickening.     IMPRESSION: 1.  Right sided internal jugular vein thrombus. 2.  Right-sided pleural effusion with right upper lobe intralobular septal thickening. Dr. Sim Zhang discussed results with DR. Nery Jimenez on 11/19/2022 at 5:53 PM.       Signed by: Dmitriy Gutierres MD

## 2022-11-23 NOTE — PLAN OF CARE
Goal Outcome Evaluation:    Problem: Plan of Care - These are the overarching goals to be used throughout the patient stay.    Goal: Optimal Comfort and Wellbeing  Outcome: Progressing  Patient reports that pain has improved with small dose of morphine at HS, this writer called pharmacy to get the concentration of the morphine changed so the patient would have less medication to swallow.    Problem: Risk for Delirium  Goal: Improved Sleep  Outcome: Progressing  Patient reports that she has been sleeping well throughout the night.

## 2022-11-23 NOTE — PLAN OF CARE
"  Problem: Risk for Delirium  Goal: Improved Sleep  Outcome: Progressing   Goal Outcome Evaluation:         Patient refused all scheduled hs medications, but changed her mind after Charge spoke to her about comfort and sleeping tonight. Received Gabapentin, and only took 3 mg Morphine, as she did not want the full dose. Stated \"I have no pain and I can't swallow\".               "

## 2022-11-23 NOTE — PLAN OF CARE
Problem: Malnutrition  Goal: Improved Nutritional Intake  Outcome: Not Progressing   Goal Outcome Evaluation:       Pt still on clear liquids.  Unable to take much.  She states she would like to try a gelatein plus and see how that goes-RD will add daily to start

## 2022-11-23 NOTE — PROGRESS NOTES
Progress Note      Date of admission: 11/19/2022  Assessment/Plan  Nguyen Olivier is a 77 year old old female with Pmhx of lung CA, chemotherapy induced neutropenia. Recently started chemotherapy for lung cancer. Work-up also showed new IJ thrombus.  Patient also presented with oropharyngeal candidiasis   Oncology recommending Eliquis for DVT.  Patient interested in enrolling in hospice care.    Severe dysphagia possibly secondary to oropharyngeal, ?  Esophageal candidiasis  -Has mouth pain, difficulty in swallowing with odynophagia . CT neck unrevealing for neck mass.    Possibly due to mucositis/Candida.    Has oral thrush  Continue fluconazole 200 mg IV once a day and Magic mouth wash   -maintenance IV fluid with kcl   -Oncology consult appreciated.    -IJ venous thrombosis is being treated by Eliquis as per recommendation from oncology.  -Eliquis is on hold as patient is unable to take oral     Intractable pain   -Palliative care consult appreciated.  -Patient has expressed interest to transition to hospice care either inpatient or outpatient.  -Pain control with oral morphine solution, Tylenol and gabapentin.    Lung cancer, stage IV  -Recently started chemotherapy  -Oncology consult appreciated  -Patient not interested in chemotherapy anymore    Right IJ vein thrombus   CT imaging.  -Eliquis started as per oncology recommendation.  -Patient would like to get her Ortho cath removed.  -She is not interested in further chemotherapy.    Thrombocytopenia  Likely related to chemotherapy  Came was 49 now dropped to 14, getting worse  Continue to monitor platelet count     Chronic respiratory failure, on home oxygen therapy,   - Chest x-ray showing unchanged right base opacity and moderate effusion.  Oncology consulted  -On 2-4 L oxygen via nasal cannula.    Diffuse rash  -Patient has diffuse rash involving trunk and lower extremities.  She states that it is itching  -Could be related to chemotherapy or other  drug reaction  -Dexamethasone 4 mg IV once a day started by oncology  -Calamine lotion     DNR/DNI     DVT PPX : lovenox     Barriers to discharge:  hospice care arrangement    Anticipated Discharge date  : 1 to 2 days    Subjective  No distress noted.  On 4 L oxygen via nasal cannula.  Patient states she feels slightly better today compared to yesterday.  Still having pain during swallowing.  Management plan discussed with the patient and she expressed understanding.    Objective  Vital signs in last 24 hours  Temp:  [97.8  F (36.6  C)-98.4  F (36.9  C)] 98.4  F (36.9  C)  Pulse:  [80-84] 82  Resp:  [18-20] 20  BP: (107-124)/(53-56) 107/53  SpO2:  [93 %-95 %] 95 %    Input and Output in 24 hrs     Intake/Output Summary (Last 24 hours) at 11/20/2022 1207  Last data filed at 11/19/2022 1550  Gross per 24 hour   Intake 1000 ml   Output --   Net 1000 ml       Physical Exam:  GEN: Alert and oriented. Not in acute distress  HEENT: Mucous membrane- moist and pink, lost tooth , has oral thrush  CHEST: Clear to auscultation bilaterally  HEART: S1S2 regular.   ABDOMEN: Soft. Non-tender, non-distended. No organomegaly.  Bowel sounds- active  Extremities: No pedal oedema  CNS: No focal neurological deficit. No involuntary movements  SKIN: Patient has diffuse rash on trunk and lower extremities which appears to be petechiael and pruritic.    Pertinent Labs   Lab Results: Personally Reviewed    Recent Results (from the past 24 hour(s))   Basic metabolic panel    Collection Time: 11/23/22  6:54 AM   Result Value Ref Range    Sodium 137 136 - 145 mmol/L    Potassium 4.3 3.4 - 5.3 mmol/L    Chloride 106 98 - 107 mmol/L    Carbon Dioxide (CO2) 24 22 - 29 mmol/L    Anion Gap 7 7 - 15 mmol/L    Urea Nitrogen 6.4 (L) 8.0 - 23.0 mg/dL    Creatinine 0.43 (L) 0.51 - 0.95 mg/dL    Calcium 7.6 (L) 8.8 - 10.2 mg/dL    Glucose 171 (H) 70 - 99 mg/dL    GFR Estimate >90 >60 mL/min/1.73m2   CBC with platelets and differential    Collection Time:  11/23/22  6:54 AM   Result Value Ref Range    WBC Count 0.7 (LL) 4.0 - 11.0 10e3/uL    RBC Count 3.69 (L) 3.80 - 5.20 10e6/uL    Hemoglobin 10.3 (L) 11.7 - 15.7 g/dL    Hematocrit 32.5 (L) 35.0 - 47.0 %    MCV 88 78 - 100 fL    MCH 27.9 26.5 - 33.0 pg    MCHC 31.7 31.5 - 36.5 g/dL    RDW 14.3 10.0 - 15.0 %    Platelet Count 13 (LL) 150 - 450 10e3/uL   Manual Differential    Collection Time: 11/23/22  6:54 AM   Result Value Ref Range    % Neutrophils 76 %    % Lymphocytes 22 %    % Monocytes 2 %    % Eosinophils 0 %    % Basophils 0 %    Absolute Neutrophils 0.5 (L) 1.6 - 8.3 10e3/uL    Absolute Lymphocytes 0.2 (L) 0.8 - 5.3 10e3/uL    Absolute Monocytes 0.0 0.0 - 1.3 10e3/uL    Absolute Eosinophils 0.0 0.0 - 0.7 10e3/uL    Absolute Basophils 0.0 0.0 - 0.2 10e3/uL    RBC Morphology Confirmed RBC Indices     Platelet Assessment  Automated Count Confirmed. Platelet morphology is normal.     Automated Count Confirmed. Platelet morphology is normal.       Pertinent Radiology   Radiology Results reviewed      EKG Results reviewed       Advanced Care Planning      Lina Diaz MD   [facility]

## 2022-11-23 NOTE — PROGRESS NOTES
[facility]  Palliative Care Daily Progress Note       Recommendations & Counseling     Suma's symptoms continue to improve. She is awaiting discharge to hospice facility.     Palliative Care will follow peripherally.     Goals of Care: life-prolonging with plan to transition to comfort-focused on discharge     Advanced Care Planning:  Advance directive: None  POLST: No - complete prior to discharge  Code status: DNR/DNI  Health care agent/surrogate: dominique Woods and [place]     Symptom Management:  #Dysphagia, mouth pain, oropharyngeal candidiasis  - Continue IV fluconazole  - Continue magic mouthwash before meals and nightly  - Continue prn tylenol  - Continue gabapentin 100mg solution at bedtime  - Continue oral morphine solution 5mg q2h prn  - Continue IV dilaudid 0.2mg q3h prn  - Requested [facility]     #Rash, pruritis  - Possibly due to chemo  - Oncology started medrol dose pack  - Continue calamine lotion tid and prn  - Continue benadryl cream tid  - Discontinued atarax due to risk of confusion, dry mouth, and other anticholinergic effects    #Constipation  - Likely due to poor oral intake  - Started senna bid but on hold as currently not tolerating oral meds  - Bisacodyl suppository prn     #Insomnia  - Gabapentin as above  - Pain management as above     Psychosocial & Spiritual:   Lives alone,  passed away on hospice years ago  Dominique Noel live nearby along with their families. Has 5 grandchildren. Had 3 sons but one passed away in 2016.   Does not identify as spiritual or Oriental orthodox  Appreciate Palliative SW support      Assessments          Nguyen is a 78 y/o woman recently diagnosed with stage IV right lung cancer (lymph node involvement above and below diaphragm), now on chemo, admitted 11/19/2022 with severe dysphagia and mouth pain attributed to candidiasis. Found to have internal jugular DVT and new right sided pleural effusion.  - Recently admitted  -10/4/2022 for acute hypoxic respiratory failure. Found to have advanced pneumonia and endobronchial tumor. Discharged on 4L home oxygen (new at that time).    Today, the patient was seen for:  Symptom management, emotional support    Prognosis, Goals, or Advance Care Planning was addressed today with: Yes.  Mood, coping, and/or meaning in the context of serious illness were addressed today: Yes.              Interval History:     Chart review/discussion with unit or clinical team members:   - Received total of 8mg oral morphine in past 24h (switched to concentrated oral morphine by pharmacy overnight)    Per patient or family/caregivers today:  Suma is resting comfortably during my visit today. She notes her throat pain is improved but she now has a cough. Her itching is much improved with topicals and steroids. Sleeping well at night. She is awaiting hospice placement and knows it may take some time.    Suma shared family photos at bedside, including photo of her  son. He and his ex wife passed away during the same year about 5 years ago. Suma and her  cared for their 2 children for a few years before they left for college. Suma is very proud of her family and appreciative of their support.    Key Palliative Symptoms:  # Pain severity the last 12 hours: low  # Dyspnea severity the last 12 hours: none  # Nausea severity the last 12 hours: none  # Anxiety severity the last 12 hours: not assessed    Patient is on opioids: assessed and I made recommendations about bowel care as above.           Review of Systems:     Besides above, an additional 3 system ROS was reviewed and is unremarkable          Medications:     I have reviewed this patient's medication profile and medications during this hospitalization.    Noted meds:    Current Facility-Administered Medications   Medication     acetaminophen (TYLENOL) solution 650 mg     albuterol (PROVENTIL HFA/VENTOLIN HFA) inhaler     [Held by  "provider] apixaban ANTICOAGULANT (ELIQUIS) tablet 5 mg     benzocaine-menthol (CEPACOL) 15-3.6 MG lozenge 1 lozenge     bisacodyl (DULCOLAX) suppository 10 mg     budesonide (PULMICORT) neb solution 0.5 mg     calamine 8-8 % lotion     calamine 8-8 % lotion     dexamethasone PF (DECADRON) injection 4 mg     dextrose 5% and 0.9% NaCl + KCl 20 mEq/L infusion     glucose gel 15-30 g    Or     dextrose 50 % injection 25-50 mL    Or     glucagon injection 1 mg     diphenhydrAMINE-zinc acetate (BENADRYL) 2-0.1 % cream     [START ON 11/24/2022] filgrastim (NEUPOGEN) injection 300 mcg     filgrastim (NEUPOGEN) injection 300 mcg     fluconazole (DIFLUCAN) intermittent infusion 200 mg     gabapentin (NEURONTIN) solution 100 mg     [Held by provider] heparin lock flush 10 UNIT/ML injection 5-10 mL     HYDROmorphone (DILAUDID) injection 0.2 mg     ipratropium - albuterol 0.5 mg/2.5 mg/3 mL (DUONEB) neb solution 3 mL     magic mouthwash suspension (diphenhydramine, lidocaine, aluminum-magnesium & simethicone)     morphine sulfate (ROXANOL) 20 mg/mL (HIGH CONC) soln 5 mg     naloxone (NARCAN) injection 0.2 mg    Or     naloxone (NARCAN) injection 0.4 mg    Or     naloxone (NARCAN) injection 0.2 mg    Or     naloxone (NARCAN) injection 0.4 mg     ondansetron (ZOFRAN) tablet 8 mg     prochlorperazine (COMPAZINE) tablet 10 mg     [Held by provider] sennosides (SENOKOT) tablet 8.6 mg     sodium chloride (PF) 0.9% PF flush 10-20 mL     sodium chloride (PF) 0.9% PF flush 10-20 mL     sodium chloride (PF) 0.9% PF flush 10-20 mL                Physical Exam:   Vital Signs: Blood pressure 107/53, pulse 82, temperature 98.4  F (36.9  C), temperature source Oral, resp. rate 20, height 1.6 m (5' 3\"), weight 61.7 kg (136 lb), SpO2 95 %.   GENERAL: laying in bed, NAD  SKIN: +diffuse erythematous rash, concentrated over back and chest, with excoriations  HEENT: +temporal muscle wasting  LUNGS: non-labored   ABDOMINAL: soft, non distended, non " tender  MUSKL: no gross joint deformities   NEUROLOGIC: A&Ox4  PSYCH: full affect           Data Reviewed:     Reviewed recent pertinent imaging:   No new imaging    Reviewed recent labs:   CMP  Recent Labs   Lab 11/23/22  0654 11/21/22  0544 11/20/22  1921 11/20/22  1242 11/20/22  1156 11/20/22  0717 11/19/22  1325    134*  --   --   --  134* 131*   POTASSIUM 4.3 3.3*  --   --   --  4.2 4.1   CHLORIDE 106 99  --   --   --  96* 94*   CO2 24 28  --   --   --  25 26   ANIONGAP 7 7  --   --   --  13 11   * 134* 85 195*   < > 56* 70   BUN 6.4* 10.0  --   --   --  14.1 13.2   CR 0.43* 0.48*  --   --   --  0.55 0.55   GFRESTIMATED >90 >90  --   --   --  >90 >90   JO 7.6* 7.5*  --   --   --  8.0* 8.3*   PROTTOTAL  --   --   --   --   --   --  5.1*   ALBUMIN  --   --   --   --   --   --  3.0*   BILITOTAL  --   --   --   --   --   --  1.4*   ALKPHOS  --   --   --   --   --   --  57   AST  --   --   --   --   --   --  14   ALT  --   --   --   --   --   --  17    < > = values in this interval not displayed.     CBC  Recent Labs   Lab 11/23/22  0654 11/22/22  0655 11/21/22  0544 11/20/22  0717   WBC 0.7* 1.7* 2.6* 4.5   RBC 3.69* 4.39 4.12 4.63   HGB 10.3* 12.3 11.6* 13.0   HCT 32.5* 39.6 36.0 41.9   MCV 88 90 87 91   MCH 27.9 28.0 28.2 28.1   MCHC 31.7 31.1* 32.2 31.0*   RDW 14.3 14.6 14.5 14.6   PLT 13* 14* 24* 37*        TTS: I have personally spent a total of 25 minutes today on the unit in review of medical record, consultation with the medical providers and assessment of patient today, with more than 50% of this time spent in counseling, coordination of care, and conversation in a family meeting re: diagnostic results, prognosis, symptom management, risks and benefits of management options,  emotional support and development of plan of care.     Gina Horn PA-C  [facility], Palliative Care  Dept phone: [phone]  Securely message with the Vocera Web Console

## 2022-11-23 NOTE — PROGRESS NOTES
Care Management Follow Up    Length of Stay (days): 3    Expected Discharge Date:  pending     Concerns to be Addressed:  Hospice home acceptance     Patient plan of care discussed at interdisciplinary rounds: Yes     Anticipated Discharge Disposition: Hospice home     Anticipated Discharge Services:  Hospice     Anticipated Discharge DME:  O2     Education Provided on the Discharge Plan:  Yes     Patient/Family in Agreement with the Plan:  yes     Referrals Placed by CM/SW:  Hospice home     Private pay costs discussed:  TBD     Additional Information:  Patient admitted for Severe dysphagia possibly secondary to oropharyngeal candidiasis. History of Lung cancer stage IV s/p chemotherapy.     Patient transitioned to comfort care.     Patient and Chuck's stated goal is for [facility]. [facility]. They are aware that hospice home is private pay. Transportation TBD. Chuck will be primary family contact for discharge planning.     Spoke with patient today 11/23. She states she prefers to defer all discharge planning to her son Chuck. Spoke with Chuck regarding [facility] having one bed but may be filled by end of today, OLP still full until next week.  He states he has not spoken with [facility]. He requested referral to [facility].  Referral faxed.     [facility]              Sharon Lopez RN

## 2022-11-24 NOTE — PLAN OF CARE
Problem: Malnutrition  Goal: Improved Nutritional Intake  Outcome: Not Progressing     Problem: Risk for Delirium  Goal: Improved Sleep  Outcome: Progressing     Problem: Risk for Delirium  Goal: Optimal Coping  Outcome: Progressing   Goal Outcome Evaluation:      Plan of Care Reviewed With: patient    Overall Patient Progress: no changeOverall Patient Progress: no change    Outcome Evaluation: Patient accepted PRN concentrated morphine SL. Up to the bathroom with soft brown stool, little bright red blood dripping from anus. Low platelet and Weak and baseline SOB, AOx4, VS consistent.    Thiago Jansen RN

## 2022-11-24 NOTE — PLAN OF CARE
Problem: Malnutrition  Goal: Improved Nutritional Intake  11/24/2022 1503 by Stacy Del Rosario, RN  Outcome: Not Progressing  11/24/2022 1502 by Stacy Del Rosario, RN  Outcome: Progressing   Goal Outcome Evaluation:       Pt has refused all oral intake this shift, states its too hard to swallow.   Refused all creams for rash, denies itching. Skin remains red and rashy.   Pt received on unit of platelets, tolerated well.

## 2022-11-24 NOTE — PROGRESS NOTES
Pulmicort order changed to PRN as patient has refused all BID neb treatments.    Becky Mcintosh, RT

## 2022-11-24 NOTE — PROGRESS NOTES
Hospitalist Progress Note    Assessment/Plan  Nguyen Olivier is a 77 year old old female with Pmhx of lung CA, chemotherapy induced neutropenia. Recently started chemotherapy for lung cancer. Work-up also showed new IJ thrombus.  Patient also presented with oropharyngeal candidiasis   Oncology recommending Eliquis for DVT.  Patient interested in enrolling in hospice care.     Severe dysphagia possibly secondary to oropharyngeal and possible esophageal candidiasis  -Has mouth pain, difficulty in swallowing with odynophagia . CT neck unrevealing for neck mass.    Continue fluconazole 200 mg IV once a day and Magic mouth wash   -maintenance IV fluid with kcl   -Oncology consult appreciated.       Intractable pain   -Palliative care consult appreciated.  -Patient has expressed interest to transition to hospice care either inpatient or outpatient.  -Pain control with oral morphine solution, Tylenol and gabapentin.     Lung cancer, stage IV  -Recently started chemotherapy  -Oncology consult appreciated  -Patient not interested in chemotherapy anymore     Right IJ vein thrombus   -Eliquis recommended but unable to take due to mouth pain and also increased risk of bleeding with thrombocytopenia      Pancytopenia - likely due to BM suppression from chemotherapy   -transfuse 1 unit platelets to minimize risk of bleeding   -neutropenia precautions  -receiving neupogen daily      Chronic respiratory failure, on home oxygen therapy,   - Chest x-ray showing unchanged right base opacity and moderate effusion.  Oncology consulted  -On 2-4 L oxygen via nasal cannula.     Diffuse rash  -Patient has diffuse rash involving trunk and lower extremities.  She states that it is itching  -Could be related to chemotherapy or other drug reaction  -Dexamethasone 4 mg IV once a day started by oncology  -Calamine lotion         Disposition Plan   Expected discharge in 2 days to hospice  once placement is available .     Entered: Kati  "MD Nuris 11/24/2022, 9:46 AM         Subjective  Patient reports persistent pain in her mouth.  She has not been eating.  Has been drinking small amounts of ice water.  Breathing is \"terrible\".  Had some bright red blood dripping from the anus and blood in her sputum.  Has not been able to bring any sputum up     Objective    Vital signs in last 24 hours  Temp:  [97.6  F (36.4  C)-98.3  F (36.8  C)] 98.3  F (36.8  C)  Pulse:  [77-98] 77  Resp:  [16-20] 20  BP: (102-117)/(51-64) 102/51  SpO2:  [90 %-96 %] 96 % @LASTSAO2(12)@ O2 Device: None (Room air)    Weight:   Wt Readings from Last 3 Encounters:   11/19/22 61.7 kg (136 lb)   11/09/22 62.1 kg (136 lb 14.4 oz)   10/12/22 65.3 kg (144 lb)      Weight change:     Intake/Output last 3 shifts  I/O last 3 completed shifts:  In: 774 [I.V.:774]  Out: -   Body mass index is 24.09 kg/m .    Physical Exam    General Appearance:    Alert, cooperative, no distress, appears chronically ill    Lungs:     Coarse bilaterally    Cardiovascular:    Regular rate ands rhythm.  Normal S1, S2.  No murmur, rub or gallop.  No edema   Abdomen:     Soft, non-tender, bowel sounds active all four quadrants,     Skin: Dry with diffuse rash   Neurologic:   Awake, alert, oriented x 3.  Grossly nonfocal      Pertinent Labs   Lab Results: personally reviewed.   Recent Labs   Lab 11/23/22  0654 11/21/22  0544 11/20/22  0717 11/19/22  1325    134* 134* 131*   CO2 24 28 25 26   BUN 6.4* 10.0 14.1 13.2   ALBUMIN  --   --   --  3.0*   ALKPHOS  --   --   --  57   ALT  --   --   --  17   AST  --   --   --  14     Recent Labs   Lab 11/24/22  0550 11/23/22  0654 11/22/22  0655   WBC 0.6* 0.7* 1.7*   HGB 10.2* 10.3* 12.3   HCT 32.9* 32.5* 39.6   PLT 9* 13* 14*     No results for input(s): CKTOTAL, TROPONINI in the last 168 hours.    Invalid input(s): TROPONINT, CKMBINDEX  Invalid input(s): POCGLUFGR    Medications  Current Facility-Administered Medications   Medication     acetaminophen " (TYLENOL) solution 650 mg     albuterol (PROVENTIL HFA/VENTOLIN HFA) inhaler     [Held by provider] apixaban ANTICOAGULANT (ELIQUIS) tablet 5 mg     benzocaine-menthol (CEPACOL) 15-3.6 MG lozenge 1 lozenge     bisacodyl (DULCOLAX) suppository 10 mg     budesonide (PULMICORT) neb solution 0.5 mg     calamine 8-8 % lotion     calamine 8-8 % lotion     dexamethasone PF (DECADRON) injection 4 mg     glucose gel 15-30 g    Or     dextrose 50 % injection 25-50 mL    Or     glucagon injection 1 mg     diphenhydrAMINE-zinc acetate (BENADRYL) 2-0.1 % cream     filgrastim (NEUPOGEN) injection 300 mcg     fluconazole (DIFLUCAN) intermittent infusion 200 mg     folic acid injection 1 mg     gabapentin (NEURONTIN) solution 100 mg     [Held by provider] heparin lock flush 10 UNIT/ML injection 5-10 mL     HYDROmorphone (DILAUDID) injection 0.2 mg     ipratropium - albuterol 0.5 mg/2.5 mg/3 mL (DUONEB) neb solution 3 mL     magic mouthwash suspension (diphenhydramine, lidocaine, aluminum-magnesium & simethicone)     morphine sulfate (ROXANOL) 20 mg/mL (HIGH CONC) soln 5 mg     naloxone (NARCAN) injection 0.2 mg    Or     naloxone (NARCAN) injection 0.4 mg    Or     naloxone (NARCAN) injection 0.2 mg    Or     naloxone (NARCAN) injection 0.4 mg     ondansetron (ZOFRAN) tablet 8 mg     prochlorperazine (COMPAZINE) tablet 10 mg     [Held by provider] sennosides (SENOKOT) tablet 8.6 mg     sodium chloride (PF) 0.9% PF flush 10-20 mL     sodium chloride (PF) 0.9% PF flush 10-20 mL     sodium chloride (PF) 0.9% PF flush 10-20 mL       Pertinent Radiology   Radiology Results: Personally reviewed   Results for orders placed or performed during the hospital encounter of 11/19/22   Soft tissue neck CT w contrast    Impression    IMPRESSION:   1.  Right sided internal jugular vein thrombus.  2.  Right-sided pleural effusion with right upper lobe intralobular septal thickening.            Dr. Sim Zhang discussed results with   Nery Jimenez on 11/19/2022 at 5:53 PM.    XR Chest Port 1 View    Impression    IMPRESSION: Unchanged right lung base opacity and moderate size right pleural effusion. Diffuse interstitial changes throughout the right lung are also similar to prior, suspicious for a lymphangitic carcinomatosis. However, infectious/inflammatory   infiltrates and asymmetric edema are difficult to exclude.    Left lung is clear. No left pleural effusion. No pneumothorax at either lung.    Cardiomediastinal silhouette is difficult to evaluate, given the right lung base opacity. Atherosclerotic calcifications involve the thoracic aorta.    There is a new right chest wall port catheter, tip overlying the distal superior vena cava.         Advanced Care Planning:  Discharge Planning discussed with patient, dana Woods, nursing staff     Kati Lawler MD  Internal Medicine Hospitalist  11/24/2022

## 2022-11-24 NOTE — PLAN OF CARE
"  Problem: Plan of Care - These are the overarching goals to be used throughout the patient stay.    Goal: Absence of Hospital-Acquired Illness or Injury  Intervention: Identify and Manage Fall Risk  Recent Flowsheet Documentation  Safety Promotion/Fall Prevention:    activity supervised    nonskid shoes/slippers when out of bed    patient and family education    fall prevention program maintained  Intervention: Prevent Skin Injury  Recent Flowsheet Documentation  Body Position: position changed independently   Goal Outcome Evaluation:    Patient reported feeling short of breath at start of shift. O2 sats - 95% of 4L via nasal cannular. Has congested cough. Weak cough.  \"It hurts\"  Has refused oral meds stated it hurts to swallow.  Refused PRN  And scheduled nebs.  Declined Benadryl cream, stated she is not itching currently.  Reluctant to take anything orally even oral [place].  Up to bathroom with stand-by assist.  Slept intermittently.  Temp: 97.6  F (36.4  C) Temp src: Oral BP: 107/55 Pulse: 81   Resp: 20 SpO2: 91 % O2 Device: Nasal cannula Oxygen Delivery: 4 LPM                               "

## 2022-11-25 NOTE — PLAN OF CARE
Problem: Plan of Care - These are the overarching goals to be used throughout the patient stay.    Goal: Absence of Hospital-Acquired Illness or Injury  Intervention: Identify and Manage Fall Risk  Recent Flowsheet Documentation  Taken 11/24/2022 1650 by Preeti Dumont, RN  Safety Promotion/Fall Prevention:    nonskid shoes/slippers when out of bed    patient and family education    activity supervised    clutter free environment maintained    fall prevention program maintained  Intervention: Prevent Skin Injury  Recent Flowsheet Documentation  Taken 11/24/2022 1650 by Preeti Dumont, RN  Body Position: position changed independently     Problem: Malnutrition  Goal: Improved Nutritional Intake  Outcome: Not Progressing   Goal Outcome Evaluation:    Patient slept intermittently all evening. Remained awake while Family members visited.   Patient continues to refuse anything oral but tolerated eating one popcycle.  Morphine given twice this shift. Less SOB.  Resting comfortably.  Will continue to monitor.

## 2022-11-25 NOTE — CONSULTS
Referral Received - [facility]     ______________________________________________________________________ Providers: Please contact [facility] with changes in orders or clinical plan of care   Nursing: Please contact [facility] with significant changes in patient condition  ______________________________________________________________________    [facility] would like to thank you for the [facility] -  Hospice referral.    Referral received.and initial insurance information sent to  Hospice intake.    We are reviewing your patient's eligibility with intake team and medical director at this time.    Our plan is to visit your patient as soon as possible. We will update the Charge Nurse on the unit with [facility] eligibility.    Clinical Liaison will meet w patient and review eligibility tomorrow on 11/26.     Ashli Etienne RN on 11/25/2022 at 2:56 PM

## 2022-11-25 NOTE — PROGRESS NOTES
Hospitalist Progress Note    Assessment/Plan  Nguyen Olivier is a 77 year old old female with Pmhx of lung CA, chemotherapy induced neutropenia. Recently started chemotherapy for lung cancer. Work-up also showed new IJ thrombus.  Patient also presented with oropharyngeal candidiasis   Oncology recommending Eliquis for DVT - holding now due to severe thrombocytopenia and unable to take oral meds.  Patient interested in enrolling in hospice care.     Severe dysphagia possibly secondary to oropharyngeal and possible esophageal candidiasis  -Has mouth pain, difficulty in swallowing with odynophagia . CT neck unrevealing for neck mass.    Continue fluconazole 200 mg IV once a day and Magic mouth wash   -maintenance IV fluid with kcl   -Oncology consult appreciated.       Intractable pain   -Palliative care consult appreciated.  -Patient has expressed interest to transition to hospice care either inpatient or outpatient.  -Pain control with oral morphine solution, Tylenol and gabapentin.  -States she cannot tolerate dilauded      Lung cancer, stage IV  -Recently started chemotherapy  -Oncology consult appreciated  -Patient not interested in chemotherapy anymore     Right IJ vein thrombus   -Eliquis recommended but unable to take due to mouth pain and also increased risk of bleeding with thrombocytopenia      Pancytopenia - likely due to BM suppression from chemotherapy   -s/p 1 unit platelets 11/24 to minimize risk of bleeding   -neutropenia precautions  -receiving neupogen daily      Chronic respiratory failure, on home oxygen therapy,   - Chest x-ray showing unchanged right base opacity and moderate effusion.  Oncology consulted  -On 2-4 L oxygen via nasal cannula.     Diffuse rash  -Patient has diffuse rash involving trunk and lower extremities.  She states that it is itching  -Could be related to chemotherapy or other drug reaction  -Dexamethasone 4 mg IV once a day started by oncology  -Calamine  lotion         Disposition Plan   Expected discharge in 1-2 days to hospice  once placement is available .     Entered: Kati Lawler MD 11/25/2022, 1:24 PM         Subjective  Patient continues to complain of pain in her mouth.  Oral intake is minimal due to pain.    Breathing is somewhat better today. Some streaks of blood in her sputum but no rectal bleeding.   Skin itching has subsided.      Objective    Vital signs in last 24 hours  Temp:  [98  F (36.7  C)-98.8  F (37.1  C)] 98.3  F (36.8  C)  Pulse:  [72-93] 93  Resp:  [20-22] 20  BP: ()/(53-58) 117/56  FiO2 (%):  [4 %] 4 %  SpO2:  [93 %-96 %] 93 % @LASTSAO2(12)@ O2 Device: None (Room air)    Weight:   Wt Readings from Last 3 Encounters:   11/19/22 61.7 kg (136 lb)   11/09/22 62.1 kg (136 lb 14.4 oz)   10/12/22 65.3 kg (144 lb)      Weight change:     Intake/Output last 3 shifts  I/O last 3 completed shifts:  In: 572   Out: -   Body mass index is 24.09 kg/m .    Physical Exam    General Appearance:    Alert, cooperative, no distress, appears chronically ill    Lungs:     Coarse bilaterally    Cardiovascular:    Regular rate ands rhythm.  Normal S1, S2.  No murmur, rub or gallop.  No edema   Abdomen:     Soft, non-tender, bowel sounds active all four quadrants,     Skin: Dry with diffuse rash   Neurologic:   Awake, alert, oriented x 3.  Grossly nonfocal      Pertinent Labs   Lab Results: personally reviewed.   Recent Labs   Lab 11/23/22  0654 11/21/22  0544 11/20/22  0717 11/19/22  1325    134* 134* 131*   CO2 24 28 25 26   BUN 6.4* 10.0 14.1 13.2   ALBUMIN  --   --   --  3.0*   ALKPHOS  --   --   --  57   ALT  --   --   --  17   AST  --   --   --  14     Recent Labs   Lab 11/25/22  0849 11/24/22  0550 11/23/22  0654   WBC 0.5* 0.6* 0.7*   HGB 11.3* 10.2* 10.3*   HCT 34.9* 32.9* 32.5*   PLT 40* 9* 13*     No results for input(s): CKTOTAL, TROPONINI in the last 168 hours.    Invalid input(s): TROPONINT, CKMBINDEX  Invalid input(s):  POCGLUFGR    Medications  Current Facility-Administered Medications   Medication     acetaminophen (TYLENOL) solution 650 mg     albuterol (PROVENTIL HFA/VENTOLIN HFA) inhaler     [Held by provider] apixaban ANTICOAGULANT (ELIQUIS) tablet 5 mg     benzocaine-menthol (CEPACOL) 15-3.6 MG lozenge 1 lozenge     bisacodyl (DULCOLAX) suppository 10 mg     budesonide (PULMICORT) neb solution 0.5 mg     calamine 8-8 % lotion     calamine 8-8 % lotion     glucose gel 15-30 g    Or     dextrose 50 % injection 25-50 mL    Or     glucagon injection 1 mg     diphenhydrAMINE-zinc acetate (BENADRYL) 2-0.1 % cream     filgrastim (NEUPOGEN) injection 300 mcg     fluconazole (DIFLUCAN) intermittent infusion 200 mg     folic acid injection 1 mg     gabapentin (NEURONTIN) solution 100 mg     [Held by provider] heparin lock flush 10 UNIT/ML injection 5-10 mL     HYDROmorphone (DILAUDID) injection 0.2-0.4 mg     ipratropium - albuterol 0.5 mg/2.5 mg/3 mL (DUONEB) neb solution 3 mL     magic mouthwash suspension (diphenhydramine, lidocaine, aluminum-magnesium & simethicone)     [Held by provider] morphine sulfate (ROXANOL) 20 mg/mL (HIGH CONC) soln 5 mg     naloxone (NARCAN) injection 0.2 mg    Or     naloxone (NARCAN) injection 0.4 mg    Or     naloxone (NARCAN) injection 0.2 mg    Or     naloxone (NARCAN) injection 0.4 mg     ondansetron (ZOFRAN) tablet 8 mg     prochlorperazine (COMPAZINE) tablet 10 mg     [Held by provider] sennosides (SENOKOT) tablet 8.6 mg     sodium chloride (PF) 0.9% PF flush 10-20 mL     sodium chloride (PF) 0.9% PF flush 10-20 mL     sodium chloride (PF) 0.9% PF flush 10-20 mL       Pertinent Radiology   Radiology Results: Personally reviewed   Results for orders placed or performed during the hospital encounter of 11/19/22   Soft tissue neck CT w contrast    Impression    IMPRESSION:   1.  Right sided internal jugular vein thrombus.  2.  Right-sided pleural effusion with right upper lobe intralobular septal  thickening.            Dr. Sim Zhang discussed results with DR. Nery Jimenez on 11/19/2022 at 5:53 PM.    XR Chest Port 1 View    Impression    IMPRESSION: Unchanged right lung base opacity and moderate size right pleural effusion. Diffuse interstitial changes throughout the right lung are also similar to prior, suspicious for a lymphangitic carcinomatosis. However, infectious/inflammatory   infiltrates and asymmetric edema are difficult to exclude.    Left lung is clear. No left pleural effusion. No pneumothorax at either lung.    Cardiomediastinal silhouette is difficult to evaluate, given the right lung base opacity. Atherosclerotic calcifications involve the thoracic aorta.    There is a new right chest wall port catheter, tip overlying the distal superior vena cava.         Advanced Care Planning:  Discharge Planning discussed with patient, Palliative care, nursing staff     Kati Lawler MD  Internal Medicine Hospitalist  11/25/2022

## 2022-11-25 NOTE — PROGRESS NOTES
"[facility]  Palliative Care Daily Progress Note       Recommendations & Counseling     Suma continues to have significant dysphagia and oropharyngeal pain which limits her ability to take anything by mouth. She is interested in transitioning to inpatient hospice here, placed GIP consult. Suma requests that hospice team speak with her family (son Chuck) as she feels too tired to talk with them. Completed POLST with dana Woods today.    Goals of Care: life-prolonging with plan to transition to comfort-focused     Advanced Care Planning:  Advance directive: None  POLST: Yes - completed today with dana Woods  Code status: DNR/DNI  Health care agent/surrogate: dominique Noel     Symptom Management:  #Dysphagia, mouth pain, oropharyngeal candidiasis  - Continue IV fluconazole  - HOLD magic mouthwash before meals and nightly (unable to tolerate, has not taken in several days)  - Continue prn tylenol  - Continue gabapentin 100mg solution at bedtime  - Continue oral morphine solution 5mg q2h prn  - Change IV dilaudid to IV morphine 1-2mg q2h prn (dilaudid makes her \"loopy\")  - Requested Integrative Therapies     #Rash, pruritis - improving  - Possibly due to chemo  - Oncology started medrol dose pack  - Continue calamine lotion prn  - Continue benadryl cream prn  - Discontinued atarax due to risk of confusion, dry mouth, and other anticholinergic effects    #Constipation  - Likely due to poor oral intake  - Started senna bid but on hold as currently not tolerating oral meds  - Bisacodyl suppository prn     #Insomnia  - Gabapentin as above  - Pain management as above     Psychosocial & Spiritual:   Lives alone,  passed away on hospice years ago  Dominique Noel live nearby along with their families. Has 5 grandchildren. Had 3 sons but one passed away in 2016.   Appreciate Palliative SW and Spiritual Care support      Assessments          Nguyen is a 78 y/o woman recently diagnosed with stage IV " right lung cancer (lymph node involvement above and below diaphragm), now on chemo, admitted 11/19/2022 with severe dysphagia and mouth pain attributed to candidiasis. Found to have internal jugular DVT and new right sided pleural effusion.  - Recently admitted 9/29-10/4/2022 for acute hypoxic respiratory failure. Found to have advanced pneumonia and endobronchial tumor. Discharged on 4L home oxygen (new at that time).    Today, the patient was seen for:  Symptom management, emotional support    Prognosis, Goals, or Advance Care Planning was addressed today with: Yes.  Mood, coping, and/or meaning in the context of serious illness were addressed today: Yes.              Interval History:     Chart review/discussion with unit or clinical team members:   - Received total of 20mg po morphine in past 24h  - Discussed case with hospitalist and RN    Per patient or family/caregivers today:  Suma is sleepy but easily arousable. She denies any pain but complains of significant throat discomfort and dysphagia, her voice is muffled and hoarse. She also has significant sputum production. Suma clarified that her throat is very painful but she does not have other pain. No dyspnea at this time.     Returned this afternoon to complete POLST with son Chuck. Also discussed possibility of GIP hospice consult given inability to tolerate po. Both Suma and Chuck are open to GIP consult, order placed.    Key Palliative Symptoms:  # Pain severity the last 12 hours: moderate  # Dyspnea severity the last 12 hours: none  # Nausea severity the last 12 hours: none  # Anxiety severity the last 12 hours: not assessed    Patient is on opioids: assessed and I made recommendations about bowel care as above.           Review of Systems:     Besides above, an additional 3 system ROS was reviewed and is unremarkable          Medications:     I have reviewed this patient's medication profile and medications during this hospitalization.    Noted meds:   "  Current Facility-Administered Medications   Medication     acetaminophen (TYLENOL) solution 650 mg     albuterol (PROVENTIL HFA/VENTOLIN HFA) inhaler     [Held by provider] apixaban ANTICOAGULANT (ELIQUIS) tablet 5 mg     benzocaine-menthol (CEPACOL) 15-3.6 MG lozenge 1 lozenge     bisacodyl (DULCOLAX) suppository 10 mg     budesonide (PULMICORT) neb solution 0.5 mg     calamine 8-8 % lotion     calamine 8-8 % lotion     glucose gel 15-30 g    Or     dextrose 50 % injection 25-50 mL    Or     glucagon injection 1 mg     diphenhydrAMINE-zinc acetate (BENADRYL) 2-0.1 % cream     filgrastim (NEUPOGEN) injection 300 mcg     fluconazole (DIFLUCAN) intermittent infusion 200 mg     folic acid injection 1 mg     gabapentin (NEURONTIN) solution 100 mg     [Held by provider] heparin lock flush 10 UNIT/ML injection 5-10 mL     HYDROmorphone (DILAUDID) injection 0.2 mg     ipratropium - albuterol 0.5 mg/2.5 mg/3 mL (DUONEB) neb solution 3 mL     magic mouthwash suspension (diphenhydramine, lidocaine, aluminum-magnesium & simethicone)     morphine sulfate (ROXANOL) 20 mg/mL (HIGH CONC) soln 5 mg     naloxone (NARCAN) injection 0.2 mg    Or     naloxone (NARCAN) injection 0.4 mg    Or     naloxone (NARCAN) injection 0.2 mg    Or     naloxone (NARCAN) injection 0.4 mg     ondansetron (ZOFRAN) tablet 8 mg     prochlorperazine (COMPAZINE) tablet 10 mg     [Held by provider] sennosides (SENOKOT) tablet 8.6 mg     sodium chloride (PF) 0.9% PF flush 10-20 mL     sodium chloride (PF) 0.9% PF flush 10-20 mL     sodium chloride (PF) 0.9% PF flush 10-20 mL                Physical Exam:   Vital Signs: Blood pressure 117/56, pulse 93, temperature 98.3  F (36.8  C), temperature source Oral, resp. rate 20, height 1.6 m (5' 3\"), weight 61.7 kg (136 lb), SpO2 93 %.   GENERAL: laying in bed, fatigued, muffled voice  SKIN: +diffuse erythematous rash with excoriations  HEENT: +temporal muscle wasting  LUNGS: non-labored  ABDOMINAL: soft, non " distended, non tender  MUSKL: no gross joint deformities   NEUROLOGIC: lethargic but easily arousable           Data Reviewed:     Reviewed recent pertinent imaging:   No new imaging    Reviewed recent labs:   CMP  Recent Labs   Lab 11/23/22  0654 11/21/22  0544 11/20/22  1921 11/20/22  1242 11/20/22  1156 11/20/22  0717 11/19/22  1325    134*  --   --   --  134* 131*   POTASSIUM 4.3 3.3*  --   --   --  4.2 4.1   CHLORIDE 106 99  --   --   --  96* 94*   CO2 24 28  --   --   --  25 26   ANIONGAP 7 7  --   --   --  13 11   * 134* 85 195*   < > 56* 70   BUN 6.4* 10.0  --   --   --  14.1 13.2   CR 0.43* 0.48*  --   --   --  0.55 0.55   GFRESTIMATED >90 >90  --   --   --  >90 >90   JO 7.6* 7.5*  --   --   --  8.0* 8.3*   PROTTOTAL  --   --   --   --   --   --  5.1*   ALBUMIN  --   --   --   --   --   --  3.0*   BILITOTAL  --   --   --   --   --   --  1.4*   ALKPHOS  --   --   --   --   --   --  57   AST  --   --   --   --   --   --  14   ALT  --   --   --   --   --   --  17    < > = values in this interval not displayed.     CBC  Recent Labs   Lab 11/25/22  0849 11/24/22  0550 11/23/22  0654 11/22/22  0655   WBC 0.5* 0.6* 0.7* 1.7*   RBC 4.02 3.73* 3.69* 4.39   HGB 11.3* 10.2* 10.3* 12.3   HCT 34.9* 32.9* 32.5* 39.6   MCV 87 88 88 90   MCH 28.1 27.3 27.9 28.0   MCHC 32.4 31.0* 31.7 31.1*   RDW 14.2 14.3 14.3 14.6   PLT 40* 9* 13* 14*        TTS: I have personally spent a total of 40 minutes today on the unit in review of medical record, consultation with the medical providers and assessment of patient today, with more than 50% of this time spent in counseling, coordination of care, and conversation in a family meeting re: diagnostic results, prognosis, symptom management, risks and benefits of management options,  emotional support and development of plan of care.     Gina Horn PA-C  [facility], Palliative Care  Dept phone: [phone]  Securely message with the Vocera Web Console

## 2022-11-25 NOTE — PLAN OF CARE
Goal Outcome Evaluation:         Pt is alert but forgetful. Continues to be on 4L of oxygen sating at 95%. Pt gets sob with activity. Pt is up to the bathroom with sba. Cares clustered overnight to promote sleep. Pt is on clears. Uneventful shift overnight.  Vaishali Johansen RN

## 2022-11-25 NOTE — CONSULTS
Integrative Therapy Consult    Healing PresenceYes  Essential Oils: Inhalation (EO/Topical oil)     Lavender - HC       Healing Music:       Breathwork:       Guided Imagery:       Acupressure:       Oshibori:       Energy Therapy:       Healing Touch:       Reiki:       Qi Gong:     Massage: Patient declined Not interested    Targeted Massage:    Sleep Promotion:       Other Therapy:       Intervention Reason: Comfort     Pre and Post Session Scores: Patient Desires Treatment: yes                             Delivery:         Referrals:      Cortney Noe

## 2022-11-26 PROBLEM — D61.810 ANTINEOPLASTIC CHEMOTHERAPY INDUCED PANCYTOPENIA (H): Status: ACTIVE | Noted: 2022-01-01

## 2022-11-26 PROBLEM — T45.1X5A ANTINEOPLASTIC CHEMOTHERAPY INDUCED PANCYTOPENIA (H): Status: ACTIVE | Noted: 2022-01-01

## 2022-11-26 NOTE — PROGRESS NOTES
CLINICAL NUTRITION SERVICES - REASSESSMENT NOTE     Nutrition Prescription    RECOMMENDATIONS FOR MDs/PROVIDERS TO ORDER:    Malnutrition Status:    Severe in acute illness    Recommendations already ordered by Registered Dietitian (RD):  RD will sign off at this time due to hospice and pt has signed a  POLST 11/25/2022 which indicates she only wants comfort  Focused treatment and allow for a natural death    Future/Additional Recommendations:

## 2022-11-26 NOTE — PLAN OF CARE
Problem: Risk for Delirium  Goal: Optimal Coping  Outcome: Not Progressing   Goal Outcome Evaluation:   Pt is frustrated with inability to swallow and feels she has something stuck in her throat. Often refuses to take anything by mouth. Duo neb given with some relief, medicated with IV morphine which has helped throat pain and dyspnea.   Port needs a needle and dressing change, pt refused to have this done this shift. Family at bedside visiting, pt is eager to go home.

## 2022-11-26 NOTE — PLAN OF CARE
Patient Aox4. VSS on 4L NC. Up with 1 fww and gb. Reports continued throat and back pain; repositioned t/o shift and prn medication given, see MAR.   Encouraging oral intake. Encouraging fluids.   PRN neb tx x1 d/t increased wheezing and unproductive congested cough; little to no improvement. SpO2 remains >90%.  Fall precautions in place. Patient using call light appropriately.    Goal Outcome Evaluation:  Problem: Plan of Care - These are the overarching goals to be used throughout the patient stay.    Goal: Optimal Comfort and Wellbeing  Outcome: Not Progressing  Intervention: Monitor Pain and Promote Comfort  Recent Flowsheet Documentation  Taken 11/25/2022 1954 by Sherly Walsh, RN  Pain Management Interventions: medication (see MAR)  Problem: Malnutrition  Goal: Improved Nutritional Intake  Outcome: Not Progressing   Problem: Plan of Care - These are the overarching goals to be used throughout the patient stay.    Goal: Absence of Hospital-Acquired Illness or Injury  Intervention: Identify and Manage Fall Risk  Recent Flowsheet Documentation  Taken 11/25/2022 2030 by Sherly Walsh, RN  Safety Promotion/Fall Prevention:   fall prevention program maintained   nonskid shoes/slippers when out of bed   room door open

## 2022-11-26 NOTE — PLAN OF CARE
"  Problem: Plan of Care - These are the overarching goals to be used throughout the patient stay.    Goal: Patient-Specific Goal (Individualized)  Description: You can add care plan individualizations to a care plan. Examples of Individualization might be:  \"Parent requests to be called daily at 9am for status\", \"I have a hard time hearing out of my right ear\", or \"Do not touch me to wake me up as it startles me\".  Outcome: Progressing     Problem: Plan of Care - These are the overarching goals to be used throughout the patient stay.    Goal: Optimal Comfort and Wellbeing  Outcome: Progressing     Problem: Risk for Delirium  Goal: Improved Attention and Thought Clarity  Outcome: Progressing    A/O x4. VSS. IV and oral morphine was given for throat pain which was helpful per pt. Stand by assist with ADLs. Pt refused to order meals due to difficulty to swallow. Taking sip of drinks.                            "

## 2022-11-26 NOTE — PROGRESS NOTES
RESPIRATORY CARE NOTE     Patient Name: Nguyen Olivier  Today's Date: 11/26/2022       RT called upon this pt for respiratory evaluations and assessment, found pt on 4LNC and saturating 92%. Pt was breathing shallowly in her breathing pattern and appeared slightly distressed with complaint of shortness of breath. BS: fair aerations auscultated upper airways and decreased @ bases, no wheezes have been heard. PRN Duo neb given x 1 for shortness of breath. Post neb tx, pt reported relief, breath sounds somewhat improved. RT will continue to follow and assess as needed.     Namita Lobo RRT

## 2022-11-26 NOTE — SIGNIFICANT EVENT
Significant Event Note    Time of event: 5:30 PM November 26, 2022    Description of event:  Called by RN  Patient with increased dyspnea, anxious, believes she might have swallowed her lower tooth   No loose/cracked teet noted on exam    Plan:  Stat portable CXR     Discussed with: bedside nurse and patient     Kati Lawler MD

## 2022-11-26 NOTE — DISCHARGE SUMMARY
[facility] MEDICINE  DISCHARGE SUMMARY     Primary Care Physician: No Ref-Primary, Physician  Admission Date: 11/19/2022   Discharge Provider: Kati Lawler MD Discharge Date: 11/26/2022   Diet:   Active Diet and Nourishment Order   Procedures     Snacks/Supplements Adult: Gelatein Plus; With Meals     Clear Liquid Diet       Code Status: No CPR- Do NOT Intubate   Activity: DCACTIVITY: Activity as tolerated        Condition at Discharge: Terminal     REASON FOR PRESENTATION(See Admission Note for Details)     Dysphagia and odynophagia     PRINCIPAL & ACTIVE DISCHARGE DIAGNOSES     Principal Problem:    Oropharyngeal dysphagia  Active Problems:    Malignant neoplasm of lower lobe of right lung (H)    Chemotherapy-induced neutropenia (H)    Thrombocytopenia (H)    History of lung cancer    On home oxygen therapy    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            PROCEDURES ( this hospitalization only)          RECOMMENDATIONS TO OUTPATIENT PROVIDER FOR F/U VISIT         DISPOSITION     GIP Hospice     SUMMARY OF HOSPITAL COURSE:      Nguyen Olivier is a 77 year old old female with recent diagnosis of lung CA, who recently started chemotherapy and presented to [facility] for evaluation of severe dysphagia and odynophagia, dermatitis and pancytopenia, all likely due to chemotherapy.  Despite treatment with IV fluconazole, steroids patient continues to have significant pain issues, has been unable to eat and has been having difficulties taking oral/sublingual medications.  Given poor tolerance of chemotherapy patient decided on comfort based cares  and signed on inpatient Hospice.      Incidentally patient was found to have right internal jugular vein thrombosis. Anticoagulation was held due to worsening thrombocytopenia.      Patient was transfused 1 unit platelets for platelet count 9 K associated with rectal bleeding.  She continues to have worsening neutropenia despite treatment with neupogen.           Discharge Medications with Med changes:     Current Discharge Medication List      CONTINUE these medications which have NOT CHANGED    Details   albuterol (PROAIR HFA/PROVENTIL HFA/VENTOLIN HFA) 108 (90 Base) MCG/ACT inhaler Inhale 2 puffs into the lungs every 4 hours as needed for shortness of breath / dyspnea or wheezing  Qty: 18 g, Refills: 6    Comments: Pharmacy may dispense brand covered by insurance (Proair, or proventil or ventolin or generic albuterol inhaler)  Associated Diagnoses: COPD, group D, by GOLD 2017 classification (H)      budesonide (PULMICORT) 0.5 MG/2ML neb solution Take 2 mLs (0.5 mg) by nebulization 2 times daily  Qty: 150 mL, Refills: 1    Associated Diagnoses: Acute respiratory failure with hypoxia (H)      dexamethasone (DECADRON) 4 MG tablet Take 1 tablet (4 mg) by mouth 2 times daily (with meals) Start one day prior to Pemetrexed (Alimta), continue on the day of treatment, and the day following Pemetrexed.  Qty: 6 tablet, Refills: 3    Associated Diagnoses: Malignant neoplasm of lower lobe of right lung (H); Chemotherapy-induced neutropenia (H)      !! ipratropium - albuterol 0.5 mg/2.5 mg/3 mL (DUONEB) 0.5-2.5 (3) MG/3ML neb solution Take 1 vial (3 mLs) by nebulization every 6 hours as needed for shortness of breath / dyspnea or wheezing  Qty: 90 mL, Refills: 0    Associated Diagnoses: Acute respiratory failure with hypoxia (H); Pneumonia of right lower lobe due to infectious organism; Aspiration of foreign body, subsequent encounter      ondansetron (ZOFRAN) 8 MG tablet Take 1 tablet (8 mg) by mouth every 8 hours as needed  (Nausea/Vomiting)  Qty: 30 tablet, Refills: 2    Associated Diagnoses: Malignant neoplasm of lower lobe of right lung (H); Chemotherapy-induced neutropenia (H)      prochlorperazine (COMPAZINE) 10 MG tablet Take 1 tablet (10 mg) by mouth every 6 hours as needed (Nausea/Vomiting)  Qty: 30 tablet, Refills: 2    Associated Diagnoses: Malignant neoplasm of lower lobe of right lung (H); Chemotherapy-induced neutropenia (H)      !! ipratropium - albuterol 0.5 mg/2.5 mg/3 mL (DUONEB) 0.5-2.5 (3) MG/3ML neb solution Take 1 vial (3 mLs) by nebulization 4 times daily for 30 days  Qty: 360 mL, Refills: 11    Associated Diagnoses: COPD, group D, by GOLD 2017 classification (H)       !! - Potential duplicate medications found. Please discuss with provider.                Rationale for medication changes:      Transitioning to Hospice         Consults       HEMATOLOGY & ONCOLOGY IP CONSULT  PALLIATIVE CARE ADULT IP CONSULT  CARE MANAGEMENT / SOCIAL WORK IP CONSULT  CARE MANAGEMENT / SOCIAL WORK IP CONSULT  INTEGRATIVE THERAPIES CONSULT  [facility] INPATIENT HOSPICE ADULT CONSULT  PHARMACY IP CONSULT    Immunizations given this encounter     Most Recent Immunizations   Administered Date(s) Administered     Influenza (IIV3) PF 11/13/2008     Tdap (Adacel,Boostrix) 07/05/2007           Anticoagulation Information      Recent INR results: No results for input(s): INR in the last 168 hours.  Warfarin doses (if applicable) or name of other anticoagulant: none      SIGNIFICANT IMAGING FINDINGS     Results for orders placed or performed during the hospital encounter of 11/19/22   Soft tissue neck CT w contrast    Impression    IMPRESSION:   1.  Right sided internal jugular vein thrombus.  2.  Right-sided pleural effusion with right upper lobe intralobular septal thickening.            Dr. Sim Zhang discussed results with DR. Nery Jimenez on 11/19/2022 at 5:53 PM.    XR Chest Port 1 View    Impression    IMPRESSION: Unchanged right  lung base opacity and moderate size right pleural effusion. Diffuse interstitial changes throughout the right lung are also similar to prior, suspicious for a lymphangitic carcinomatosis. However, infectious/inflammatory   infiltrates and asymmetric edema are difficult to exclude.    Left lung is clear. No left pleural effusion. No pneumothorax at either lung.    Cardiomediastinal silhouette is difficult to evaluate, given the right lung base opacity. Atherosclerotic calcifications involve the thoracic aorta.    There is a new right chest wall port catheter, tip overlying the distal superior vena cava.       SIGNIFICANT LABORATORY FINDINGS     Most Recent 3 CBC's:Recent Labs   Lab Test 11/26/22  0641 11/25/22  0849 11/24/22  0550   WBC 0.4* 0.5* 0.6*   HGB 11.1* 11.3* 10.2*   MCV 85 87 88   PLT 24* 40* 9*     Most Recent 3 BMP's:Recent Labs   Lab Test 11/23/22  0654 11/21/22  0544 11/20/22  1921 11/20/22  1156 11/20/22  0717    134*  --   --  134*   POTASSIUM 4.3 3.3*  --   --  4.2   CHLORIDE 106 99  --   --  96*   CO2 24 28  --   --  25   BUN 6.4* 10.0  --   --  14.1   CR 0.43* 0.48*  --   --  0.55   ANIONGAP 7 7  --   --  13   JO 7.6* 7.5*  --   --  8.0*   * 134* 85   < > 56*    < > = values in this interval not displayed.     Most Recent 2 LFT's:Recent Labs   Lab Test 11/19/22  1325 11/14/22  0830   AST 14 17   ALT 17 29   ALKPHOS 57 63   BILITOTAL 1.4* 0.8         Discharge Orders     No discharge procedures on file.    Examination   Physical Exam   Temp:  [98.3  F (36.8  C)-99.2  F (37.3  C)] 99.2  F (37.3  C)  Pulse:  [] 98  Resp:  [18-22] 22  BP: (104-122)/(52-63) 108/54  SpO2:  [88 %-95 %] 92 %  Wt Readings from Last 1 Encounters:   11/19/22 61.7 kg (136 lb)     General Appearance:    Drowsy, cachectic and chronically ill    Lungs:     Coarse bilaterally    Cardiovascular:    Regular rate ands rhythm.  Normal S1, S2.  No murmur, rub or gallop.  No edema   Abdomen:     Soft, non-tender,  bowel sounds active all four quadrants,     Skin: Dry with diffuse rash   Neurologic:   Drowsy       Please see EMR for more detailed significant labs, imaging, consultant notes etc.    I, Kati Lawler MD, personally saw the patient today and spent greater than 30 minutes discharging this patient.    Kati Lawler MD  [facility]    CC:No Ref-Primary, Physician

## 2022-11-26 NOTE — H&P
[facility]    History and Physical - Hospitalist Service       Date of Admission:  11/26/2022    Assessment & Plan   Nguyen Olivier is a 77 year old female who is being transitioned to inpatient hospice on 11/26/2022. Please see the discharge summary from the same date for more details of her hospital course.    Pain secondary to mucositis/oropharyngeal and possibly esophageal candidiasis   -Morphine 5 mg IV every 6 hours +4 mg IV every 2 hours as needed  -Acetaminophen 650 mg rectal suppository as needed every 4 hours  -We will complete 7 days of IV fluconazole for oropharyngeal/esophageal candidiasis    Respiratory distress  -Morphine as above  -Pulmicort, duo nebs and albuterol nebs as needed    Anxiety  -Lorazepam 1 mg IV every 6 hours +1 mg every 2 hours as needed    Terminal secretions  -Scopolamine patch  -Ropinirole every 6 hours as needed    Terminal agitation  -Haloperidol 0.5 mg every 6 hours as needed    Constipation  Dulcolax suppository as needed           Diet: Snacks/Supplements Adult: Gelatein Plus; With Meals  Regular Diet Adult    Thomson Catheter: Not present  Central Lines: PRESENT     Code Status: No CPR- Do NOT Intubate    Expected Discharge: working on discharge with hospice    Kati Lawler MD  Hospitalist Service  [facility]  Securely message with the [employer] Web Console (learn more here)  Text page via [employer] Paging/Directory         ______________________________________________________________________    Chief Complaint   Transitioning to inpatient hospice    History of Present Illness   Nguyen Olivier is a 77 year old female who is being transitioned to inpatient hospice.  Please see the discharge summary from the same date for more details; a brief summary of her current symptoms is included here.    77-year-old female with recent diagnosis of lung cancer who recently started chemotherapy and presented to [facility] for evaluation of severe dysphagia and odynophagia, chemotherapy-induced dermatitis and pancytopenia.  Incidentally she was found to have right internal jugular vein thrombosis however has not been anticoagulated due to severe thrombocytopenia.  She was having worsening neutropenia despite treatment with Neupogen    Review of Systems    Review of systems was not needed on this patient    Past Medical History    Please see the medical & surgical history outlined in the H&P from the previous hospital encounter    Social History   Please see the social history outlined in the H&P from the previous hospital encounter    Family History   Please see the family history outlined in the H&P from the previous hospital encounter    Prior to Admission Medications   Prior to Admission Medications   Prescriptions Last Dose Informant Patient Reported? Taking?   albuterol (PROAIR HFA/PROVENTIL HFA/VENTOLIN HFA) 108 (90 Base) MCG/ACT inhaler   No No   Sig: Inhale 2 puffs into the lungs every 4 hours as needed for shortness of breath / dyspnea or wheezing   budesonide (PULMICORT) 0.5 MG/2ML neb solution   No No   Sig: Take 2 mLs (0.5 mg) by nebulization 2 times daily   dexamethasone (DECADRON) 4 MG tablet   No No   Sig: Take 1 tablet (4 mg) by mouth 2 times daily (with meals) Start one day prior to Pemetrexed (Alimta), continue on the day of treatment, and the day following Pemetrexed.   ipratropium - albuterol 0.5 mg/2.5 mg/3 mL (DUONEB) 0.5-2.5 (3) MG/3ML neb solution   No No   Sig: Take 1 vial (3 mLs) by nebulization every 6 hours as needed for shortness of breath / dyspnea or wheezing   ipratropium - albuterol 0.5 mg/2.5 mg/3 mL (DUONEB) 0.5-2.5 (3) MG/3ML neb solution   No No   Sig: Take 1 vial (3 mLs) by nebulization 4 times daily for 30 days   Patient not taking: Reported on 11/19/2022   ondansetron (ZOFRAN) 8 MG tablet   No No   Sig: Take 1 tablet (8 mg) by mouth every 8 hours as needed (Nausea/Vomiting)    prochlorperazine (COMPAZINE) 10 MG tablet   No No   Sig: Take 1 tablet (10 mg) by mouth every 6 hours as needed (Nausea/Vomiting)      Facility-Administered Medications: None     Allergies   No Known Allergies    Physical Exam   Vital Signs:                    Weight: 0 lbs 0 oz    General Appearance:    Drowsy, cachectic and chronically ill    Lungs:     Coarse bilaterally    Cardiovascular:    Regular rate ands rhythm.  Normal S1, S2.  No murmur, rub or gallop.  No edema   Abdomen:     Soft, non-tender, bowel sounds active all four quadrants,     Skin: Dry with diffuse rash   Neurologic:   Drowsy

## 2022-11-26 NOTE — CONSULTS
[facility]    Consult Note - [facility] Inpatient Hospice    ______________________________________________________________________    [facility] Contact Number: [phone]    - Providers: Please contact [facility] with changes in orders or clinical plan of care   - Nursing: Please contact [facility] with significant changes in patient condition    Hospice will notify the care team (including the hospitalist) to confirm date of inpatient hospice (GIP) admission.    New Epic encounter will not be created until hospice completes admission.   ______________________________________________________________________        Hospice Diagnosis: Lung Cancer adenocarcinoma stage 4    Indication for Inpatient Hospice: Intractable pain, respiratory distress AEB >20 respirations/minute    Goals for Hospital Discharge: Once symptoms are managed will collaborate with family community resources    Plan of Care Discussed with the Following:   - Nurses:  Stacy Del Rosario RN  - Charge Nurse:   - Hospitalist/Rounding Provider: -  Kati Lawler MD    Hospitalist - Internal Medicine    Since 11/24/2022    [phone]      Nguyen's Family/Preferred Contact: dana Olivier [phone]  - Hospice Provider: Dr. Chuck Pablo    Summary of Visit (includes assessment, medications and any new orders):   Patient in distress in bed. She attempted to swallow tea brought by the staff nurse and this created more distress. Her respirations were laboredand 23. She was wearing humidified oxygen 4 liters NC and is oxygen dependent.        Spoke to son Chuck who is in agreement with hospice as well as his brother. We will be meeting today tenatively at 1330. The fact that she is having poor response to therapy and the fact that a feeding tube needs to be placed concerns him. Educated on the hospice benefit and if she does rally the benefit can be revoked. However, she is currently suffering and has  signed a POLST 11/25/22 which clearly indicates she only wants comfort focused treatment and allow for a natural death.     Recommendations:  PAIN:  Morphine IV only per patients request today: Scheduled every 6 hours 5 mg IV  PRN morphine 4 mg IV every 2 hours   Acetaminophen 650 mg Rectal suppository PRN every 4 hours    Respiratory Distress:dyspne, wheezing, SOB, respirations >20   Pulmicort neb solution PRN  Duoneb sln 3 ml every 6 hours PRN  Albuterol inhaler every 4 hours PRN     Anxiety:  Ativan 1 mg IV every 6 hours Scheduled  PRN Ativan 1 mg every 2 hours IV   Terminal Secretions:   Scopolamine patch  Robinul 0.2-0.4 mg IV every 6 hours PRN  Keep all respiratory treatments currently PRN to remain   Terminal Agitation:  Haldol 0.5 mg every 6 hours PRN SL/IV    Continue the following medications in MAR  Ducolax  suupository Rectal daily for opioid induced constipation  Benadryl  Cream  Sarna lotion for itching    Verito James RN  [phone]

## 2022-11-26 NOTE — PHARMACY-ADMISSION MEDICATION HISTORY
Admission medication history completed at [facility]. Please see Pharmacy - Admission Medication History note from 11/19/2022.

## 2022-11-27 NOTE — PROGRESS NOTES
Hospitalist Progress Note    Assessment/Plan  Nguyen Olivier is a 77 year old female who is being transitioned to inpatient hospice on 11/26/2022. Please see the discharge summary from the same date for more details of her hospital course.     Pain secondary to mucositis/oropharyngeal and possibly esophageal candidiasis - patient appears oversadated   -will decrease IV Morphine to 2 mg every 6 hours +4 mg IV every 2 hours as needed  -Acetaminophen 650 mg rectal suppository as needed every 4 hours  -We will complete 7 days of IV fluconazole for oropharyngeal/esophageal candidiasis     Respiratory distress  -Morphine as above  -Pulmicort, duo nebs and albuterol nebs as needed     Anxiety - oversedated   -will discontinue scheduled   -continue Lorazepam IV1 mg every 2 hours as needed     Terminal secretions  -Scopolamine patch  -Ropinirole every 6 hours as needed     Terminal agitation  -Haloperidol 0.5 mg every 6 hours as needed     Constipation  Dulcolax suppository as needed      Disposition Plan   Expected discharge TBD days to TBD once symptoms are controlled .     Entered: Kati Lawler MD 11/27/2022, 10:48 AM         Subjective  Has been very sedated per RN report     Objective    Vital signs in last 24 hours  Resp:  [18-20] 18 @LASTSAO2(12)@      Weight:   Wt Readings from Last 3 Encounters:   11/19/22 61.7 kg (136 lb)   11/09/22 62.1 kg (136 lb 14.4 oz)   10/12/22 65.3 kg (144 lb)      Weight change:     Intake/Output last 3 shifts  No intake/output data recorded.  There is no height or weight on file to calculate BMI.    Physical Exam    General Appearance:    Sleeping   Lungs:     Respirations unlabored Coarse breath sounds bilaterally    Cardiovascular:    Regular rate ands rhythm.  Normal S1, S2.  No murmur, rub or gallop.  No edema   Abdomen:     Soft, non-tender, bowel sounds active   Neurologic:   Sleeping     Pertinent Labs   Lab Results: personally reviewed.   Recent Labs   Lab  11/23/22  0654 11/21/22  0544    134*   CO2 24 28   BUN 6.4* 10.0     Recent Labs   Lab 11/26/22  0641 11/25/22  0849 11/24/22  0550   WBC 0.4* 0.5* 0.6*   HGB 11.1* 11.3* 10.2*   HCT 34.2* 34.9* 32.9*   PLT 24* 40* 9*     No results for input(s): CKTOTAL, TROPONINI in the last 168 hours.    Invalid input(s): TROPONINT, CKMBINDEX  Invalid input(s): POCGLUFGR    Medications  Current Facility-Administered Medications   Medication     acetaminophen (TYLENOL) solution 650 mg     acetaminophen (TYLENOL) Suppository 650 mg     albuterol (PROVENTIL HFA/VENTOLIN HFA) inhaler     bisacodyl (DULCOLAX) suppository 10 mg     [START ON 11/29/2022] bisacodyl (DULCOLAX) suppository 10 mg     budesonide (PULMICORT) neb solution 0.5 mg     calamine 8-8 % lotion     carboxymethylcellulose PF (REFRESH PLUS) 0.5 % ophthalmic solution 1-2 drop     glucose gel 15-30 g    Or     dextrose 50 % injection 25-50 mL    Or     glucagon injection 1 mg     diphenhydrAMINE-zinc acetate (BENADRYL) 2-0.1 % cream     fluconazole (DIFLUCAN) intermittent infusion 200 mg     glycopyrrolate (ROBINUL) injection 0.2 mg     haloperidol lactate (HALDOL) injection 0.5 mg     ipratropium - albuterol 0.5 mg/2.5 mg/3 mL (DUONEB) neb solution 3 mL     LORazepam (ATIVAN) injection 1 mg     morphine (PF) injection 2 mg     morphine (PF) injection 4 mg     naloxone (NARCAN) injection 0.1 mg     naloxone (NARCAN) injection 0.2 mg    Or     naloxone (NARCAN) injection 0.4 mg    Or     naloxone (NARCAN) injection 0.2 mg    Or     naloxone (NARCAN) injection 0.4 mg     naloxone (NARCAN) injection 0.2 mg     ondansetron (ZOFRAN ODT) ODT tab 4 mg    Or     ondansetron (ZOFRAN) injection 4 mg     scopolamine (TRANSDERM) 72 hr patch 1 patch    And     scopolamine (TRANSDERM-SCOP) Patch in Place     sodium chloride (PF) 0.9% PF flush 10-20 mL     sodium chloride (PF) 0.9% PF flush 10-20 mL     [START ON 12/17/2022] sodium chloride (PF) 0.9% PF flush 10-20 mL        Pertinent Radiology   Personally reviewed images   EXAM: XR CHEST PORT 1 VIEW  LOCATION: [facility]  DATE/TIME: 11/26/2022 5:44 PM                                                                   IMPRESSION: Cardiac size is obscured. There is complete opacification of the right hemithorax likely secondary to a large effusion, this has worsened when compared to the prior examination. Right Mediport, unchanged. Left lung clear.        Advanced Care Planning:  Discharge Planning discussed with patient's sister  Hospice staff and RN      Kati Lawler MD  Internal Medicine Hospitalist  11/27/2022

## 2022-11-27 NOTE — PLAN OF CARE
Problem: Plan of Care - These are the overarching goals to be used throughout the patient stay.    Goal: Optimal Comfort and Wellbeing  Outcome: Progressing  Intervention: Monitor Pain and Promote Comfort  Recent Flowsheet Documentation  Taken 11/26/2022 1701 by Margie Anne, RN  Pain Management Interventions: medication (see MAR)     Problem: End-of-Life Care  Goal: Comfort, Peace and Preserved Dignity  Outcome: Progressing  Intervention: Promote Physical Comfort  Recent Flowsheet Documentation  Taken 11/26/2022 1701 by Margie Anne, RN  Sensory Stimulation Regulation: quiet environment promoted   Goal Outcome Evaluation:        Comfort care. Deferred full assessment and VS. C/o 10/10 pain, PRN morphine given x1 and patient has been calm and sleeping since then. Transitioned over to inpatient hospice this shift. Port SL, patient refused needle and dressing changed today. Patient c/o chest pain, felt like there was something stuck in her chest, MD paged and MD ordered chest xray, see results. Patient resting comfortably after xray taken and was repositioned at 2200. Nursing to continue to monitor.

## 2022-11-27 NOTE — PROGRESS NOTES
Care Management Follow Up    Length of Stay (days): 1    Expected Discharge Date: 11/28/2022     Concerns to be Addressed:  Care progression, Hospice care     Patient plan of care discussed at interdisciplinary rounds: Yes    Anticipated Discharge Disposition: Other (Comments) (anticipate end of life at hospital)     Anticipated Discharge Services:  GIP Hospice with anticipated end of life at hospital  Anticipated Discharge DME:  None    Patient/family educated on Medicare website which has current facility and service quality ratings:    Education Provided on the Discharge Plan:    Patient/Family in Agreement with the Plan:      Referrals Placed by CM/SW:    Private pay costs discussed: Not applicable    Additional Information:  Pt transitioned to comfort care on 11/26/2022 with GIP Hospice services being provided by [facility].    Claudia Velazquez RN

## 2022-11-27 NOTE — PLAN OF CARE
Problem: Plan of Care - These are the overarching goals to be used throughout the patient stay.    Goal: Optimal Comfort and Wellbeing  11/27/2022 0502 by Moni Bill RN  Outcome: Progressing     Problem: End-of-Life Care  Goal: Comfort, Peace and Preserved Dignity  11/27/2022 0502 by Moni Bill RN  Outcome: Progressing   Goal Outcome Evaluation:  Pt is on comfort cares. Enrolled in inpatient hospice. Restless at start of shift. Trying to get up but noted to be too weak. Endorsed being in pain when asked. Scheduled morphine given. Incontinent cares completed with assist of 2, turned and repositioned in bed. Patient calm and resting the rest of the night Bed alarm on for safety. Got up to the commode with assist of 1, required assist of 2 to put back to bed. Prn robinul given for secretions.

## 2022-11-27 NOTE — CONSULTS
[facility]    Progress Note - [facility]    ______________________________________________________________________    [facility]  Contact Number: [phone]    - Providers: Please contact [facility] with changes in orders or clinical plan of care   - Nursing: Please contact [facility] with significant changes in patient condition  ______________________________________________________________________        Plan of Care Discussed with the Following:   - Nurses:  Stacy Del Rosario RN  - Hospitalist/Rounding Provider:  Kati Lawler MD    Hospitalist - Internal Medicine    Since 2022    [phone]     - Gisela's Family/Preferred Contact: Chuck son  - Hospice Provider: Dr. Chuck Pablo    [facility] Eligibility: Yes, due to unmanaged symptoms and determination of adequate symptom management.   Patient continues to have IV Morphine and PRN ativan, robinul, scopolamine patch.     Family bereavement risk: low risk, sons are comfortable with their mothers decision to seek comfort based cares only.Call placed to son Chuck who reports he will be taking family pictures to give to mom for madelaine. Also spoke to sister and updated her on plan of care. [facility]     /[facility]ation facility: Family has not make a final decision on provider, however, she will be cremated. Family instructed to disclose information to hospice and or staff members.  Education provided:    Pertinent assessment information: Patient was very solmnulent during encounter. Discussed with Dr. Lawler to dial back morphine and have 2 mg scheduled every 6 hours and PRN morphine for symptom management. Also has PRN dose of ativan for anxiety.   Pulse:117  Oxygen sats 92% 4 liters NC humidified oxygen.   Expiratory Rhonchi and course lung sounds throughout.   Hospice recommendations:  As discussed with Dr. Lawler dial back morphine  And use  ativan PRN for anxiety.     Chris James CNL  [phone]

## 2022-11-27 NOTE — PLAN OF CARE
Problem: End-of-Life Care  Goal: Comfort, Peace and Preserved Dignity  Outcome: Progressing   Pt has appeared comfortable and at peace with plan of care for hospice.     Problem: Plan of Care - These are the overarching goals to be used throughout the patient stay.    Goal: Optimal Comfort and Wellbeing  Intervention: Monitor Pain and Promote Comfort  Recent Flowsheet Documentation  Taken 11/27/2022 1235 by Stacy Del Rosario RN  Pain Management Interventions:   medication (see MAR)   emotional support   pillow support provided   quiet environment facilitated   Goal Outcome Evaluation:  Changes made to pain medications due to increased lethargy and changes in mentation. Medicated once with IV dilaudid with relief.   Pt transferred to [facility] with 2 assists to void.   Continues to refuse to take anything by mouth due to pain.

## 2022-11-28 NOTE — PROGRESS NOTES
Hospitalist Progress Note    Assessment/Plan  Nguyen Olivier is a 77 year old female who is being transitioned to inpatient hospice on 11/26/2022. Please see the discharge summary from the same date for more details of her hospital course.     Pain secondary to mucositis/oropharyngeal and possibly esophageal candidiasis - appears comfortable today  -continue current dose of IV Morphine to 2 mg every 6 hours +4 mg IV every 2 hours as needed  -Acetaminophen 650 mg rectal suppository as needed every 4 hours  -Completed 7 days of IV fluconazole for oropharyngeal/esophageal candidiasis     Respiratory distress  -Morphine as above  -Pulmicort, duo nebs and albuterol nebs as needed     Anxiety   -continue Lorazepam IV1 mg every 2 hours as needed     Terminal secretions  -Scopolamine patch  -Ropinirole every 6 hours as needed     Terminal agitation  -Haloperidol 0.5 mg every 6 hours as needed     Constipation  Dulcolax suppository as needed      Disposition Plan   Expected discharge TBD days to TBD once symptoms are controlled .     Entered: Kati Lawler MD 11/28/2022, 1:38 PM         Subjective  Was agitated overnight  Thomson catheter placed - appears very comfortable at this time     Objective    Vital signs in last 24 hours  Temp:  [98.4  F (36.9  C)] 98.4  F (36.9  C)  Pulse:  [70] 70  Resp:  [18] 18  BP: (112)/(64) 112/64  SpO2:  [96 %] 96 % @LASTSAO2(12)@      Weight:   Wt Readings from Last 3 Encounters:   11/19/22 61.7 kg (136 lb)   11/09/22 62.1 kg (136 lb 14.4 oz)   10/12/22 65.3 kg (144 lb)      Weight change:     Intake/Output last 3 shifts  I/O last 3 completed shifts:  In: 60 [P.O.:60]  Out: 600 [Urine:600]  There is no height or weight on file to calculate BMI.    Physical Exam    General Appearance:    Sleeping   Lungs:     Respirations unlabored Coarse breath sounds bilaterally    Cardiovascular:    Regular rate ands rhythm.  Normal S1, S2.  No murmur, rub or gallop.  No edema   Abdomen:      Soft, non-tender, bowel sounds active  : Thomson catheter with small amount of concentrated urine    Neurologic:   Sleeping     Pertinent Labs   Lab Results: personally reviewed.   Recent Labs   Lab 11/23/22  0654      CO2 24   BUN 6.4*     Recent Labs   Lab 11/26/22  0641 11/25/22  0849 11/24/22  0550   WBC 0.4* 0.5* 0.6*   HGB 11.1* 11.3* 10.2*   HCT 34.2* 34.9* 32.9*   PLT 24* 40* 9*     No results for input(s): CKTOTAL, TROPONINI in the last 168 hours.    Invalid input(s): TROPONINT, CKMBINDEX  Invalid input(s): POCGLUFGR    Medications  Current Facility-Administered Medications   Medication     acetaminophen (TYLENOL) solution 650 mg     acetaminophen (TYLENOL) Suppository 650 mg     albuterol (PROVENTIL HFA/VENTOLIN HFA) inhaler     bisacodyl (DULCOLAX) suppository 10 mg     [START ON 11/29/2022] bisacodyl (DULCOLAX) suppository 10 mg     budesonide (PULMICORT) neb solution 0.5 mg     calamine 8-8 % lotion     carboxymethylcellulose PF (REFRESH PLUS) 0.5 % ophthalmic solution 1-2 drop     glucose gel 15-30 g    Or     dextrose 50 % injection 25-50 mL    Or     glucagon injection 1 mg     diphenhydrAMINE-zinc acetate (BENADRYL) 2-0.1 % cream     glycopyrrolate (ROBINUL) injection 0.2 mg     haloperidol lactate (HALDOL) injection 0.5 mg     ipratropium - albuterol 0.5 mg/2.5 mg/3 mL (DUONEB) neb solution 3 mL     LORazepam (ATIVAN) injection 1 mg     morphine (PF) injection 2 mg     morphine (PF) injection 4 mg     naloxone (NARCAN) injection 0.1 mg     naloxone (NARCAN) injection 0.2 mg    Or     naloxone (NARCAN) injection 0.4 mg    Or     naloxone (NARCAN) injection 0.2 mg    Or     naloxone (NARCAN) injection 0.4 mg     naloxone (NARCAN) injection 0.2 mg     ondansetron (ZOFRAN ODT) ODT tab 4 mg    Or     ondansetron (ZOFRAN) injection 4 mg     scopolamine (TRANSDERM) 72 hr patch 1 patch    And     scopolamine (TRANSDERM-SCOP) Patch in Place     sodium chloride (PF) 0.9% PF flush 10-20 mL     sodium  chloride (PF) 0.9% PF flush 10-20 mL     [START ON 12/17/2022] sodium chloride (PF) 0.9% PF flush 10-20 mL       Pertinent Radiology   No new images       Advanced Care Planning:  Discharge Planning discussed with nursing staff       Kati Lawler MD  Internal Medicine Hospitalist  11/28/2022

## 2022-11-28 NOTE — PLAN OF CARE
Problem: Plan of Care - These are the overarching goals to be used throughout the patient stay.    Goal: Optimal Comfort and Wellbeing  Outcome: Progressing     Problem: End-of-Life Care  Goal: Comfort, Peace and Preserved Dignity  Outcome: Progressing  Intervention: Promote Physical Comfort  Recent Flowsheet Documentation  Taken 11/27/2022 1906 by Leydi Pop RN  Sensory Stimulation Regulation: quiet environment promoted     Goal Outcome Evaluation: Pt seems uncomfortable. Pain was manage with schedule morphine. Pt is alert to self only. Arouse to voice. Pt would swing leg over bed indicating she needs to void. Was able to get pt to bedside commode with 2 assist. Pt seems very lethargic and sedated. Port needle changed on shift.

## 2022-11-28 NOTE — PROGRESS NOTES
Cross Cover  Notified by RN pt incontinent and does not want to be, and cannot get up, comfort cares  Thomson ordered

## 2022-11-28 NOTE — PLAN OF CARE
Problem: End-of-Life Care  Goal: Comfort, Peace and Preserved Dignity  Outcome: Progressing     Problem: Plan of Care - These are the overarching goals to be used throughout the patient stay.    Goal: Optimal Comfort and Wellbeing  Outcome: Progressing   Goal Outcome Evaluation:    Pt  was medicated with IV ativan after position change at 1230, appeared anxious and was restless. Respirations 24, on four liters of oxygen with humidity. Occasional nonproductive cough noted.

## 2022-11-28 NOTE — PLAN OF CARE
Problem: Plan of Care - These are the overarching goals to be used throughout the patient stay.    Goal: Optimal Comfort and Wellbeing  11/28/2022 0630 by Leydi Pop RN  Outcome: Progressing  11/28/2022 0630 by Leydi Pop RN  Outcome: Progressing  11/27/2022 2257 by Leydi Pop RN  Outcome: Progressing     Problem: End-of-Life Care  Goal: Comfort, Peace and Preserved Dignity  11/28/2022 0630 by Leydi Pop RN  Outcome: Progressing  11/28/2022 0630 by Leydi Pop RN  Outcome: Progressing  11/27/2022 2257 by Leydi Pop RN  Outcome: Progressing  Intervention: Promote Physical Comfort  Recent Flowsheet Documentation  Taken 11/28/2022 0138 by Leydi Pop RN  Sensory Stimulation Regulation: quiet environment promoted  Taken 11/27/2022 1906 by Leydi Pop RN  Sensory Stimulation Regulation: quiet environment promoted     Goal Outcome Evaluation: Pt seems restless and uncomfortable. Pain was manage with schedule morphine. Pt was crying stating she did not have the energy or can not get up in any longer to the bedside commode. Thomson catheter was inserted. Thomson catheter in place, patent, and draining. Reposition for comfort.

## 2022-11-29 NOTE — PROGRESS NOTES
Visit to Suma in her hospital room after discussing current condition with Charge RN and Staff Nurse Isaias Noonan.  Nguyen is lying supine in bed. R 16 with moderate deep secretions audible at bedside. Suma's head is turned slightly right. She is sleeping quietly, and does not arouse to light touch or soft voice.  Breathing is non-labored, regular. Forehead temperature feels normal at time of visit.  Recent notes describe somnolence related to medication regimen.  Hospice Chaplain visit earlier today resulted in Nurse administering prn lorazepam with positive response AEB reduction in labored breathing pace, relaxed brow, and release of clutching . Morphine dose may be too low given Mannyph's report of Suma's affirmation of pain experience. Mannyph responded with scheduled 2mg dose.    Hospice recommending increase of scheduled dose to 3mg, and reduction of prn dose to 3mg; increase prn frequency to q1hr.  Along with schedule Lorazepam 0.5mg q6hr, and reduction of prn dose to 0.5mg.  Consider change from diphenhydramine cream to Sarna or equivalent (Menphor) scheduled qshift with cares.    Daily visits continue from Hospice during hospitalization. Suma does not able to safely tolerate transport as she is nearing transition to active dying.    Joseph Chairez  [facility] RN Clinical Liaison - [facility]    Office:   [phone]    Cell: [phone]    [facility]: [phone]

## 2022-11-29 NOTE — CONSULTS
[facility]    Progress Note - [facility]    ______________________________________________________________________    [facility]  Contact Number: [phone]    - Providers: Please contact [facility] with changes in orders or clinical plan of care   - Nursing: Please contact [facility] with significant changes in patient condition  ______________________________________________________________________          GIP Eligibility: Terminal congestion    Family bereavement risk: Moderate    /cremation facility: Pending    Education provided: Provided education on EOL s/sx, terminal congestion, pain management, changes in pulse, grieving and bereavement.     Pertinent assessment information: HR: 144; RR: 20; O2: 74%RA. Lungs congested, rhonchi bilat lobes. Limbs cool to the touch. Pulses palpable in BLE. Unresponsive to stimuli.     Hospice recommendations:   -Increase PRN Robinul to 0.2mg q4h for congestion    Ashli Etienne RN  [facility]  Contact information available via [facility] Paging/Directory     Listed as Hospice [facility] in [facility]

## 2022-11-29 NOTE — PLAN OF CARE
Problem: Plan of Care - These are the overarching goals to be used throughout the patient stay.    Goal: Plan of Care Review  Description: The Plan of Care Review/Shift note should be completed every shift.  The Outcome Evaluation is a brief statement about your assessment that the patient is improving, declining, or no change.  This information will be displayed automatically on your shift note.  Outcome: Adequate for Care Transition   Goal Outcome Evaluation:       Pt turned and repositioned every 2 hours. Oral cares done. Thomson in place. Family at bedside. Scheduled morphine given.

## 2022-11-29 NOTE — PROGRESS NOTES
Hospitalist Progress Note    Assessment/Plan  Nguyen Olivier is a 77 year old female who is being transitioned to inpatient hospice on 11/26/2022. Please see the discharge summary from the same date for more details of her hospital course.     Pain secondary to mucositis/oropharyngeal and possibly esophageal candidiasis - appears comfortable today  -will increase scheduled IV Morphine dose  to 3 mg every 6 hours and change prn dose to 3 mg IV every 2 hours as needed  -Acetaminophen 650 mg rectal suppository as needed every 4 hours  -Completed 7 days of IV fluconazole for oropharyngeal/esophageal candidiasis     Respiratory distress  -Morphine as above  -Pulmicort, duo nebs and albuterol nebs as needed     Anxiety   -We will schedule IV lorazepam 0.5 mg every 6 hours     Terminal secretions  -Scopolamine patch  -Ropinirole IV every 6 hours as needed     Terminal agitation  -Haloperidol 0.5 mg every 6 hours as needed     Constipation  Dulcolax suppository as needed      Disposition Plan   Expected discharge: not stable for transfer      Entered: Kati Lawler MD 11/29/2022, 11:29 AM         Subjective  Son Chuck at bedside   Became agitated last night when needed to be repositioned   Appears comfortable at this time       Objective    Vital signs in last 24 hours    @LASTSAO2(12)@      Weight:   Wt Readings from Last 3 Encounters:   11/19/22 61.7 kg (136 lb)   11/09/22 62.1 kg (136 lb 14.4 oz)   10/12/22 65.3 kg (144 lb)      Weight change:     Intake/Output last 3 shifts  I/O last 3 completed shifts:  In: -   Out: 150 [Urine:150]  There is no height or weight on file to calculate BMI.    Physical Exam    General Appearance:    Sleeping  HEENT: Dried blood noted on the palate and lips    Lungs:     Respirations unlabored . Diminished breath sound over right lung, clear on the left    Cardiovascular:    Regular rate ands rhythm.  Normal S1, S2.  No murmur, rub or gallop.  No edema   Abdomen:     Soft,  non-tender, bowel sounds active  : Thomson catheter with small amount of concentrated urine    Neurologic:   Sleeping     Pertinent Labs   Lab Results: personally reviewed.   Recent Labs   Lab 11/23/22  0654      CO2 24   BUN 6.4*     Recent Labs   Lab 11/26/22  0641 11/25/22  0849 11/24/22  0550   WBC 0.4* 0.5* 0.6*   HGB 11.1* 11.3* 10.2*   HCT 34.2* 34.9* 32.9*   PLT 24* 40* 9*     No results for input(s): CKTOTAL, TROPONINI in the last 168 hours.    Invalid input(s): TROPONINT, CKMBINDEX  Invalid input(s): POCGLUFGR    Medications  Current Facility-Administered Medications   Medication     acetaminophen (TYLENOL) solution 650 mg     acetaminophen (TYLENOL) Suppository 650 mg     albuterol (PROVENTIL HFA/VENTOLIN HFA) inhaler     bisacodyl (DULCOLAX) suppository 10 mg     bisacodyl (DULCOLAX) suppository 10 mg     budesonide (PULMICORT) neb solution 0.5 mg     calamine 8-8 % lotion     camphor-menthol (DERMASARRA) lotion     carboxymethylcellulose PF (REFRESH PLUS) 0.5 % ophthalmic solution 1-2 drop     glucose gel 15-30 g    Or     dextrose 50 % injection 25-50 mL    Or     glucagon injection 1 mg     glycopyrrolate (ROBINUL) injection 0.2 mg     haloperidol lactate (HALDOL) injection 0.5 mg     ipratropium - albuterol 0.5 mg/2.5 mg/3 mL (DUONEB) neb solution 3 mL     LORazepam (ATIVAN) injection 0.5 mg     LORazepam (ATIVAN) injection 0.5 mg     morphine (PF) injection 3 mg     morphine (PF) injection 3 mg     naloxone (NARCAN) injection 0.1 mg     naloxone (NARCAN) injection 0.2 mg    Or     naloxone (NARCAN) injection 0.4 mg    Or     naloxone (NARCAN) injection 0.2 mg    Or     naloxone (NARCAN) injection 0.4 mg     naloxone (NARCAN) injection 0.2 mg     ondansetron (ZOFRAN ODT) ODT tab 4 mg    Or     ondansetron (ZOFRAN) injection 4 mg     scopolamine (TRANSDERM) 72 hr patch 1 patch    And     scopolamine (TRANSDERM-SCOP) Patch in Place     sodium chloride (PF) 0.9% PF flush 10-20 mL     sodium  chloride (PF) 0.9% PF flush 10-20 mL     [START ON 12/17/2022] sodium chloride (PF) 0.9% PF flush 10-20 mL       Pertinent Radiology   No new images       Advanced Care Planning:  Discharge Planning discussed with nursing staff and son Chuck Lawler MD  Internal Medicine Hospitalist  11/29/2022

## 2022-11-29 NOTE — PLAN OF CARE
"  Problem: Plan of Care - These are the overarching goals to be used throughout the patient stay.    Goal: Patient-Specific Goal (Individualized)  Description: You can add care plan individualizations to a care plan. Examples of Individualization might be:  \"Parent requests to be called daily at 9am for status\", \"I have a hard time hearing out of my right ear\", or \"Do not touch me to wake me up as it startles me\".  Outcome: Progressing   Goal Outcome Evaluation:       Pt appears comfortable. Two sons at bedside also agree that their mom looks comfortable. Turned and repositioned every 2 hours. Respiration are around 20 per minute. Opens eyes with gentle body shake. Body is warm to touch.  On oxygen for comfort.                  "

## 2022-11-29 NOTE — PLAN OF CARE
Problem: End-of-Life Care  Goal: Comfort, Peace and Preserved Dignity  Outcome: Progressing  Intervention: Promote Physical Comfort  Recent Flowsheet Documentation  Taken 11/29/2022 0957 by Sean Owusu RN  Environmental Support: calm environment promoted  Intervention: Promote Peace and Maintain Dignity  Recent Flowsheet Documentation  Taken 11/29/2022 0957 by Sean Owusu RN  Supportive Measures: active listening utilized  Intervention: Support the Grieving Process  Recent Flowsheet Documentation  Taken 11/29/2022 0957 by Sean Owusu RN  Family/Support System Care:   caregiver stress acknowledged   support provided   Goal Outcome Evaluation:  Pt turned and repositioned for comfort.  Minimal oral cares done due to family stating it is to painful.  Medication given as scheduled. Robinul prn given for congestion.  Scopolamine patch in place.  Skin warm to touch.  Will open eyes on occasion.

## 2022-11-30 NOTE — DISCHARGE SUMMARY
[facility]    Death Summary  Hospitalist    Date of Admission:  2022  Date of Death:   2022 at 7:07AM  Provider Completing Death Summary: Chuck Ruiz DO, DO        Hospital Course     77 year old old female with recent diagnosis of lung CA, who recently started chemotherapy and presented to [facility] for evaluation of severe dysphagia and odynophagia, dermatitis and pancytopenia, all likely due to chemotherapy.  Given poor tolerance of chemotherapy, FTT, patient decided on comfort based cares and signed onto inpatient Hospice.   22 at 7:07am.    Cause of death:     Chuck Ruiz DO, DO

## 2022-11-30 NOTE — PLAN OF CARE
Problem: End-of-Life Care  Goal: Comfort, Peace and Preserved Dignity  Outcome: Progressing  Intervention: Promote Physical Comfort  Recent Flowsheet Documentation  Taken 11/30/2022 0030 by Sonia Torres RN  Sensory Stimulation Regulation:   quiet environment promoted   lighting decreased  Environmental Support:   calm environment promoted   environmental consistency promoted   rest periods encouraged  Intervention: Support the Grieving Process  Recent Flowsheet Documentation  Taken 11/30/2022 0030 by Sonia Torres RN  Family/Support System Care: presence promoted   Goal Outcome Evaluation:    Patient is not responding to pain or to any vigorous stimulation. Breathing noted to be more tachypneic. Scheduled morphine and Lorazepam IV given. Patient had also Rubinol prn x 1. Turning, repositioning and oral care done.   Son stayed overnight at the bedside.

## 2022-11-30 NOTE — PLAN OF CARE
Problem: Plan of Care - These are the overarching goals to be used throughout the patient stay.    Goal: Patient-Specific Goal (Individualized)  Outcome: Progressing  Goal: Absence of Hospital-Acquired Illness or Injury  Outcome: Progressing  Intervention: Identify and Manage Fall Risk  Recent Flowsheet Documentation  Taken 11/29/2022 1600 by Kristina Chand RN  Safety Promotion/Fall Prevention:   patient and family education   bed alarm on   room organization consistent   safety round/check completed  Intervention: Prevent Skin Injury  Recent Flowsheet Documentation  Taken 11/29/2022 2248 by Kristina Chand RN  Body Position:   turned   left  Taken 11/29/2022 2022 by Krisitna Chand RN  Body Position:   turned   right  Taken 11/29/2022 1814 by Kristina Chand RN  Body Position:   turned   left  Taken 11/29/2022 1558 by Kristina Chand RN  Body Position:   turned   right   upper extremity elevated  Goal: Optimal Comfort and Wellbeing  Outcome: Progressing  Intervention: Provide Person-Centered Care  Recent Flowsheet Documentation  Taken 11/29/2022 1600 by Kristina Chand RN  Trust Relationship/Rapport: care explained     Problem: End-of-Life Care  Goal: Comfort, Peace and Preserved Dignity  Outcome: Progressing  Intervention: Promote Physical Comfort  Recent Flowsheet Documentation  Taken 11/29/2022 1600 by Kristina Chand RN  Sensory Stimulation Regulation:   quiet environment promoted   lighting decreased  Environmental Support:   calm environment promoted   environmental consistency promoted   rest periods encouraged  Intervention: Support the Grieving Process  Recent Flowsheet Documentation  Taken 11/29/2022 1600 by Kristina Chand RN  Family/Support System Care: presence promoted   Goal Outcome Evaluation:     No signs of pain/agitation, resting peacefully. Receiving scheduled IV Ativan & Morphine. Scopolamine patch behind right ear. Gave PRN Robinul x1 for secretions. Repositioned Q2H during  shift. Patient's son at the bedside.

## 2022-11-30 NOTE — DEATH PRONOUNCEMENT
House officer called to pronounce Nguyen Olivier dead. Patient unresponsive to verbal and tactile stimuli.  No heart sounds heard, no pulse felt. No spontaneous respirations.  Pupils fixed and dilated. Patient pronounced dead at 7:07 AM on 11/30/2022. Nursing staff to notify family and primary doctor.      Tracey Mcdermott MD  [facility] Medicine Resident. PGY-2  Pager #: [phone]
